# Patient Record
Sex: FEMALE | Race: BLACK OR AFRICAN AMERICAN | Employment: OTHER | URBAN - METROPOLITAN AREA
[De-identification: names, ages, dates, MRNs, and addresses within clinical notes are randomized per-mention and may not be internally consistent; named-entity substitution may affect disease eponyms.]

---

## 2017-03-02 ENCOUNTER — APPOINTMENT (OUTPATIENT)
Dept: PREADMISSION TESTING | Age: 62
End: 2017-03-02
Payer: COMMERCIAL

## 2017-03-02 ENCOUNTER — HOSPITAL ENCOUNTER (OUTPATIENT)
Dept: PREADMISSION TESTING | Age: 62
Discharge: HOME | End: 2017-03-02
Payer: COMMERCIAL

## 2017-03-02 VITALS
SYSTOLIC BLOOD PRESSURE: 136 MMHG | TEMPERATURE: 98 F | HEIGHT: 59 IN | HEART RATE: 90 BPM | RESPIRATION RATE: 20 BRPM | DIASTOLIC BLOOD PRESSURE: 80 MMHG | BODY MASS INDEX: 25.1 KG/M2 | WEIGHT: 124.5 LBS

## 2017-03-02 LAB
ABO + RH BLD: NORMAL
ANION GAP BLD CALC-SCNC: 8 MMOL/L (ref 5–15)
APPEARANCE UR: CLEAR
ATRIAL RATE: 82 BPM
BACTERIA URNS QL MICRO: NEGATIVE /HPF
BILIRUB UR QL: NEGATIVE
BLOOD GROUP ANTIBODIES SERPL: NORMAL
BUN SERPL-MCNC: 10 MG/DL (ref 6–20)
BUN/CREAT SERPL: 17 (ref 12–20)
CALCIUM SERPL-MCNC: 9 MG/DL (ref 8.5–10.1)
CALCULATED P AXIS, ECG09: 58 DEGREES
CALCULATED R AXIS, ECG10: 15 DEGREES
CALCULATED T AXIS, ECG11: 43 DEGREES
CHLORIDE SERPL-SCNC: 104 MMOL/L (ref 97–108)
CO2 SERPL-SCNC: 28 MMOL/L (ref 21–32)
COLOR UR: NORMAL
CREAT SERPL-MCNC: 0.6 MG/DL (ref 0.55–1.02)
DIAGNOSIS, 93000: NORMAL
EPITH CASTS URNS QL MICRO: NORMAL /LPF
ERYTHROCYTE [DISTWIDTH] IN BLOOD BY AUTOMATED COUNT: 13.8 % (ref 11.5–14.5)
EST. AVERAGE GLUCOSE BLD GHB EST-MCNC: 126 MG/DL
GLUCOSE SERPL-MCNC: 88 MG/DL (ref 65–100)
GLUCOSE UR STRIP.AUTO-MCNC: NEGATIVE MG/DL
HBA1C MFR BLD: 6 % (ref 4.2–6.3)
HCT VFR BLD AUTO: 37 % (ref 35–47)
HGB BLD-MCNC: 12.2 G/DL (ref 11.5–16)
HGB UR QL STRIP: NEGATIVE
HYALINE CASTS URNS QL MICRO: NORMAL /LPF (ref 0–5)
INR PPP: 1 (ref 0.9–1.1)
KETONES UR QL STRIP.AUTO: NEGATIVE MG/DL
LEUKOCYTE ESTERASE UR QL STRIP.AUTO: NEGATIVE
MCH RBC QN AUTO: 31.4 PG (ref 26–34)
MCHC RBC AUTO-ENTMCNC: 33 G/DL (ref 30–36.5)
MCV RBC AUTO: 95.1 FL (ref 80–99)
NITRITE UR QL STRIP.AUTO: NEGATIVE
P-R INTERVAL, ECG05: 138 MS
PH UR STRIP: 6.5 [PH] (ref 5–8)
PLATELET # BLD AUTO: 267 K/UL (ref 150–400)
POTASSIUM SERPL-SCNC: 3.7 MMOL/L (ref 3.5–5.1)
PROT UR STRIP-MCNC: NEGATIVE MG/DL
PROTHROMBIN TIME: 9.8 SEC (ref 9–11.1)
Q-T INTERVAL, ECG07: 346 MS
QRS DURATION, ECG06: 66 MS
QTC CALCULATION (BEZET), ECG08: 404 MS
RBC # BLD AUTO: 3.89 M/UL (ref 3.8–5.2)
RBC #/AREA URNS HPF: NORMAL /HPF (ref 0–5)
SODIUM SERPL-SCNC: 140 MMOL/L (ref 136–145)
SP GR UR REFRACTOMETRY: 1.02 (ref 1–1.03)
SPECIMEN EXP DATE BLD: NORMAL
UA: UC IF INDICATED,UAUC: NORMAL
UROBILINOGEN UR QL STRIP.AUTO: 1 EU/DL (ref 0.2–1)
VENTRICULAR RATE, ECG03: 82 BPM
WBC # BLD AUTO: 12.3 K/UL (ref 3.6–11)
WBC URNS QL MICRO: NORMAL /HPF (ref 0–4)

## 2017-03-02 PROCEDURE — 93005 ELECTROCARDIOGRAM TRACING: CPT

## 2017-03-02 PROCEDURE — 85027 COMPLETE CBC AUTOMATED: CPT | Performed by: ORTHOPAEDIC SURGERY

## 2017-03-02 PROCEDURE — 86900 BLOOD TYPING SEROLOGIC ABO: CPT | Performed by: ORTHOPAEDIC SURGERY

## 2017-03-02 PROCEDURE — 80048 BASIC METABOLIC PNL TOTAL CA: CPT | Performed by: ORTHOPAEDIC SURGERY

## 2017-03-02 PROCEDURE — 81001 URINALYSIS AUTO W/SCOPE: CPT | Performed by: ORTHOPAEDIC SURGERY

## 2017-03-02 PROCEDURE — 83036 HEMOGLOBIN GLYCOSYLATED A1C: CPT | Performed by: ORTHOPAEDIC SURGERY

## 2017-03-02 PROCEDURE — 36415 COLL VENOUS BLD VENIPUNCTURE: CPT | Performed by: ORTHOPAEDIC SURGERY

## 2017-03-02 PROCEDURE — 85610 PROTHROMBIN TIME: CPT | Performed by: ORTHOPAEDIC SURGERY

## 2017-03-02 RX ORDER — CELECOXIB 200 MG/1
200 CAPSULE ORAL ONCE
Status: CANCELLED | OUTPATIENT
Start: 2017-03-15 | End: 2017-03-15

## 2017-03-02 RX ORDER — CHLORHEXIDINE GLUCONATE 1.2 MG/ML
15 RINSE ORAL 2 TIMES DAILY
COMMUNITY
End: 2017-09-23

## 2017-03-02 RX ORDER — ATORVASTATIN CALCIUM 40 MG/1
40 TABLET, FILM COATED ORAL DAILY
COMMUNITY
End: 2021-07-01 | Stop reason: SDUPTHER

## 2017-03-02 RX ORDER — CEFAZOLIN SODIUM IN 0.9 % NACL 2 G/50 ML
2 INTRAVENOUS SOLUTION, PIGGYBACK (ML) INTRAVENOUS ONCE
Status: CANCELLED | OUTPATIENT
Start: 2017-03-15 | End: 2017-03-15

## 2017-03-02 RX ORDER — ACETAMINOPHEN 500 MG
1000 TABLET ORAL ONCE
Status: CANCELLED | OUTPATIENT
Start: 2017-03-15 | End: 2017-03-15

## 2017-03-02 RX ORDER — DEXAMETHASONE SODIUM PHOSPHATE 100 MG/10ML
10 INJECTION INTRAMUSCULAR; INTRAVENOUS ONCE
Status: CANCELLED | OUTPATIENT
Start: 2017-03-15 | End: 2017-03-15

## 2017-03-02 RX ORDER — MONTELUKAST SODIUM 10 MG/1
10 TABLET ORAL DAILY
COMMUNITY
End: 2020-06-19

## 2017-03-02 RX ORDER — DOXYCYCLINE 50 MG/1
50 TABLET ORAL DAILY
COMMUNITY
End: 2017-11-16

## 2017-03-02 RX ORDER — PREGABALIN 75 MG/1
75 CAPSULE ORAL ONCE
Status: CANCELLED | OUTPATIENT
Start: 2017-03-15 | End: 2017-03-15

## 2017-03-02 NOTE — PERIOP NOTES
Preoperative instructions reviewed with patient. Patient given six pack of CHG wipes. Instructions reviewed on use of CHG wipes. Patient given SSI infection FAQ sheet. MRSA/MSSA treatment instruction sheet given with an explanation to patient that they will be notified if treatment instructions need to be initiated. Patient was given opportunity to ask questions on the information provided.

## 2017-03-03 LAB
BACTERIA SPEC CULT: NORMAL
BACTERIA SPEC CULT: NORMAL
SERVICE CMNT-IMP: NORMAL

## 2017-03-03 NOTE — PERIOP NOTES
Faxed preadmission testing reports (and fax confirmation received) to Dr. Zenobia Pedersen. Called on 3-3-17 at 1005am ( left message on Kamini's voicemail) RE: abnormal CBC. Mireya De Souza

## 2017-03-15 ENCOUNTER — ANESTHESIA EVENT (OUTPATIENT)
Dept: SURGERY | Age: 62
DRG: 470 | End: 2017-03-15
Payer: COMMERCIAL

## 2017-03-15 ENCOUNTER — APPOINTMENT (OUTPATIENT)
Dept: GENERAL RADIOLOGY | Age: 62
DRG: 470 | End: 2017-03-15
Attending: ORTHOPAEDIC SURGERY
Payer: COMMERCIAL

## 2017-03-15 ENCOUNTER — ANESTHESIA (OUTPATIENT)
Dept: SURGERY | Age: 62
DRG: 470 | End: 2017-03-15
Payer: COMMERCIAL

## 2017-03-15 ENCOUNTER — HOSPITAL ENCOUNTER (INPATIENT)
Age: 62
LOS: 2 days | Discharge: HOME HEALTH | DRG: 470 | End: 2017-03-17
Attending: ORTHOPAEDIC SURGERY | Admitting: ORTHOPAEDIC SURGERY
Payer: COMMERCIAL

## 2017-03-15 PROBLEM — M16.11 PRIMARY OSTEOARTHRITIS OF RIGHT HIP: Status: ACTIVE | Noted: 2017-03-15

## 2017-03-15 LAB
GLUCOSE BLD STRIP.AUTO-MCNC: 97 MG/DL (ref 65–100)
SERVICE CMNT-IMP: NORMAL

## 2017-03-15 PROCEDURE — G8978 MOBILITY CURRENT STATUS: HCPCS

## 2017-03-15 PROCEDURE — C1776 JOINT DEVICE (IMPLANTABLE): HCPCS | Performed by: ORTHOPAEDIC SURGERY

## 2017-03-15 PROCEDURE — 97161 PT EVAL LOW COMPLEX 20 MIN: CPT

## 2017-03-15 PROCEDURE — 77030034850: Performed by: ORTHOPAEDIC SURGERY

## 2017-03-15 PROCEDURE — 77030007866 HC KT SPN ANES BBMI -B: Performed by: ANESTHESIOLOGY

## 2017-03-15 PROCEDURE — 77030018846 HC SOL IRR STRL H20 ICUM -A: Performed by: ORTHOPAEDIC SURGERY

## 2017-03-15 PROCEDURE — 73501 X-RAY EXAM HIP UNI 1 VIEW: CPT

## 2017-03-15 PROCEDURE — 77030002933 HC SUT MCRYL J&J -A: Performed by: ORTHOPAEDIC SURGERY

## 2017-03-15 PROCEDURE — 77030018547 HC SUT ETHBND1 J&J -B: Performed by: ORTHOPAEDIC SURGERY

## 2017-03-15 PROCEDURE — 77030010507 HC ADH SKN DERMBND J&J -B: Performed by: ORTHOPAEDIC SURGERY

## 2017-03-15 PROCEDURE — 77030035236 HC SUT PDS STRATFX BARB J&J -B: Performed by: ORTHOPAEDIC SURGERY

## 2017-03-15 PROCEDURE — 74011250636 HC RX REV CODE- 250/636

## 2017-03-15 PROCEDURE — 74011250637 HC RX REV CODE- 250/637: Performed by: ORTHOPAEDIC SURGERY

## 2017-03-15 PROCEDURE — 0SR904A REPLACEMENT OF RIGHT HIP JOINT WITH CERAMIC ON POLYETHYLENE SYNTHETIC SUBSTITUTE, UNCEMENTED, OPEN APPROACH: ICD-10-PCS | Performed by: ORTHOPAEDIC SURGERY

## 2017-03-15 PROCEDURE — 76210000006 HC OR PH I REC 0.5 TO 1 HR: Performed by: ORTHOPAEDIC SURGERY

## 2017-03-15 PROCEDURE — 77030013079 HC BLNKT BAIR HGGR 3M -A: Performed by: ANESTHESIOLOGY

## 2017-03-15 PROCEDURE — 74011000250 HC RX REV CODE- 250

## 2017-03-15 PROCEDURE — C9290 INJ, BUPIVACAINE LIPOSOME: HCPCS | Performed by: ORTHOPAEDIC SURGERY

## 2017-03-15 PROCEDURE — 97116 GAIT TRAINING THERAPY: CPT

## 2017-03-15 PROCEDURE — 77030011640 HC PAD GRND REM COVD -A: Performed by: ORTHOPAEDIC SURGERY

## 2017-03-15 PROCEDURE — 77030013079 HC BLNKT BAIR HGGR 3M -A

## 2017-03-15 PROCEDURE — 74011250636 HC RX REV CODE- 250/636: Performed by: ORTHOPAEDIC SURGERY

## 2017-03-15 PROCEDURE — G8979 MOBILITY GOAL STATUS: HCPCS

## 2017-03-15 PROCEDURE — 77030032490 HC SLV COMPR SCD KNE COVD -B

## 2017-03-15 PROCEDURE — 74011250636 HC RX REV CODE- 250/636: Performed by: ANESTHESIOLOGY

## 2017-03-15 PROCEDURE — 82962 GLUCOSE BLOOD TEST: CPT

## 2017-03-15 PROCEDURE — 74011000250 HC RX REV CODE- 250: Performed by: ORTHOPAEDIC SURGERY

## 2017-03-15 PROCEDURE — 74011000258 HC RX REV CODE- 258: Performed by: ORTHOPAEDIC SURGERY

## 2017-03-15 PROCEDURE — 77030012935 HC DRSG AQUACEL BMS -B: Performed by: ORTHOPAEDIC SURGERY

## 2017-03-15 PROCEDURE — 77030006802 HC BLD SAW RECIP BRSM -B: Performed by: ORTHOPAEDIC SURGERY

## 2017-03-15 PROCEDURE — 76060000034 HC ANESTHESIA 1.5 TO 2 HR: Performed by: ORTHOPAEDIC SURGERY

## 2017-03-15 PROCEDURE — 76000 FLUOROSCOPY <1 HR PHYS/QHP: CPT

## 2017-03-15 PROCEDURE — 77030011264 HC ELECTRD BLD EXT COVD -A: Performed by: ORTHOPAEDIC SURGERY

## 2017-03-15 PROCEDURE — 77030005520 HC CATH URETH FOL38 BARD -A: Performed by: ORTHOPAEDIC SURGERY

## 2017-03-15 PROCEDURE — 77030018836 HC SOL IRR NACL ICUM -A: Performed by: ORTHOPAEDIC SURGERY

## 2017-03-15 PROCEDURE — 76010000162 HC OR TIME 1.5 TO 2 HR INTENSV-TIER 1: Performed by: ORTHOPAEDIC SURGERY

## 2017-03-15 PROCEDURE — 65270000029 HC RM PRIVATE

## 2017-03-15 DEVICE — COMPONENT TOT HIP PRIMARY CERM ALTRX: Type: IMPLANTABLE DEVICE | Status: FUNCTIONAL

## 2017-03-15 DEVICE — CUP ACET SECTOR GRIPTION 48MM -- TI: Type: IMPLANTABLE DEVICE | Site: HIP | Status: FUNCTIONAL

## 2017-03-15 DEVICE — SCREW BNE L35MM DIA6.5MM CANC HIP DOME PINN: Type: IMPLANTABLE DEVICE | Site: HIP | Status: FUNCTIONAL

## 2017-03-15 DEVICE — HEAD FEM DIA32MM +1MM OFFSET 12/14 TAPR HIP CERAMIC BIOLOX: Type: IMPLANTABLE DEVICE | Site: HIP | Status: FUNCTIONAL

## 2017-03-15 DEVICE — IMPLANTABLE DEVICE: Type: IMPLANTABLE DEVICE | Site: HIP | Status: FUNCTIONAL

## 2017-03-15 DEVICE — LINER ACET OD48MM ID32MM NEUT OFFSET HIP ALTRX PINN: Type: IMPLANTABLE DEVICE | Site: HIP | Status: FUNCTIONAL

## 2017-03-15 RX ORDER — SODIUM CHLORIDE 0.9 % (FLUSH) 0.9 %
5-10 SYRINGE (ML) INJECTION AS NEEDED
Status: DISCONTINUED | OUTPATIENT
Start: 2017-03-15 | End: 2017-03-17 | Stop reason: HOSPADM

## 2017-03-15 RX ORDER — SODIUM CHLORIDE, SODIUM LACTATE, POTASSIUM CHLORIDE, CALCIUM CHLORIDE 600; 310; 30; 20 MG/100ML; MG/100ML; MG/100ML; MG/100ML
125 INJECTION, SOLUTION INTRAVENOUS CONTINUOUS
Status: DISCONTINUED | OUTPATIENT
Start: 2017-03-15 | End: 2017-03-15 | Stop reason: HOSPADM

## 2017-03-15 RX ORDER — DEXAMETHASONE SODIUM PHOSPHATE 10 MG/ML
10 INJECTION INTRAMUSCULAR; INTRAVENOUS ONCE
Status: DISCONTINUED | OUTPATIENT
Start: 2017-03-15 | End: 2017-03-15 | Stop reason: HOSPADM

## 2017-03-15 RX ORDER — LOSARTAN POTASSIUM 50 MG/1
100 TABLET ORAL DAILY
Status: DISCONTINUED | OUTPATIENT
Start: 2017-03-16 | End: 2017-03-17 | Stop reason: HOSPADM

## 2017-03-15 RX ORDER — DIPHENHYDRAMINE HYDROCHLORIDE 50 MG/ML
12.5 INJECTION, SOLUTION INTRAMUSCULAR; INTRAVENOUS AS NEEDED
Status: DISCONTINUED | OUTPATIENT
Start: 2017-03-15 | End: 2017-03-15 | Stop reason: HOSPADM

## 2017-03-15 RX ORDER — NALOXONE HYDROCHLORIDE 0.4 MG/ML
0.4 INJECTION, SOLUTION INTRAMUSCULAR; INTRAVENOUS; SUBCUTANEOUS AS NEEDED
Status: DISCONTINUED | OUTPATIENT
Start: 2017-03-15 | End: 2017-03-17 | Stop reason: HOSPADM

## 2017-03-15 RX ORDER — DEXAMETHASONE SODIUM PHOSPHATE 10 MG/ML
10 INJECTION INTRAMUSCULAR; INTRAVENOUS ONCE
Status: COMPLETED | OUTPATIENT
Start: 2017-03-16 | End: 2017-03-16

## 2017-03-15 RX ORDER — IRBESARTAN 300 MG/1
300 TABLET ORAL DAILY
Status: DISCONTINUED | OUTPATIENT
Start: 2017-03-16 | End: 2017-03-15 | Stop reason: CLARIF

## 2017-03-15 RX ORDER — DICLOFENAC SODIUM 75 MG/1
75 TABLET, DELAYED RELEASE ORAL 2 TIMES DAILY
Status: DISCONTINUED | OUTPATIENT
Start: 2017-03-16 | End: 2017-03-17 | Stop reason: HOSPADM

## 2017-03-15 RX ORDER — ACETAMINOPHEN 500 MG
1000 TABLET ORAL ONCE
Status: COMPLETED | OUTPATIENT
Start: 2017-03-15 | End: 2017-03-15

## 2017-03-15 RX ORDER — OXYCODONE AND ACETAMINOPHEN 5; 325 MG/1; MG/1
1 TABLET ORAL AS NEEDED
Status: DISCONTINUED | OUTPATIENT
Start: 2017-03-15 | End: 2017-03-15 | Stop reason: HOSPADM

## 2017-03-15 RX ORDER — MIDAZOLAM HYDROCHLORIDE 1 MG/ML
1 INJECTION, SOLUTION INTRAMUSCULAR; INTRAVENOUS AS NEEDED
Status: DISCONTINUED | OUTPATIENT
Start: 2017-03-15 | End: 2017-03-15 | Stop reason: HOSPADM

## 2017-03-15 RX ORDER — ASPIRIN 325 MG
325 TABLET, DELAYED RELEASE (ENTERIC COATED) ORAL 2 TIMES DAILY
Status: DISCONTINUED | OUTPATIENT
Start: 2017-03-15 | End: 2017-03-17 | Stop reason: HOSPADM

## 2017-03-15 RX ORDER — AMOXICILLIN 250 MG
1 CAPSULE ORAL 2 TIMES DAILY
Status: DISCONTINUED | OUTPATIENT
Start: 2017-03-15 | End: 2017-03-17 | Stop reason: HOSPADM

## 2017-03-15 RX ORDER — HYDROMORPHONE HYDROCHLORIDE 1 MG/ML
0.5 INJECTION, SOLUTION INTRAMUSCULAR; INTRAVENOUS; SUBCUTANEOUS
Status: ACTIVE | OUTPATIENT
Start: 2017-03-15 | End: 2017-03-16

## 2017-03-15 RX ORDER — OXYCODONE HYDROCHLORIDE 5 MG/1
5 TABLET ORAL
Status: DISCONTINUED | OUTPATIENT
Start: 2017-03-15 | End: 2017-03-17 | Stop reason: HOSPADM

## 2017-03-15 RX ORDER — ATORVASTATIN CALCIUM 40 MG/1
40 TABLET, FILM COATED ORAL DAILY
Status: DISCONTINUED | OUTPATIENT
Start: 2017-03-16 | End: 2017-03-17 | Stop reason: HOSPADM

## 2017-03-15 RX ORDER — BUPIVACAINE HYDROCHLORIDE 5 MG/ML
INJECTION, SOLUTION EPIDURAL; INTRACAUDAL AS NEEDED
Status: DISCONTINUED | OUTPATIENT
Start: 2017-03-15 | End: 2017-03-15 | Stop reason: HOSPADM

## 2017-03-15 RX ORDER — PREGABALIN 75 MG/1
75 CAPSULE ORAL ONCE
Status: COMPLETED | OUTPATIENT
Start: 2017-03-15 | End: 2017-03-15

## 2017-03-15 RX ORDER — CEFAZOLIN SODIUM IN 0.9 % NACL 2 G/50 ML
2 INTRAVENOUS SOLUTION, PIGGYBACK (ML) INTRAVENOUS EVERY 8 HOURS
Status: COMPLETED | OUTPATIENT
Start: 2017-03-15 | End: 2017-03-16

## 2017-03-15 RX ORDER — ONDANSETRON 2 MG/ML
4 INJECTION INTRAMUSCULAR; INTRAVENOUS AS NEEDED
Status: DISCONTINUED | OUTPATIENT
Start: 2017-03-15 | End: 2017-03-15 | Stop reason: HOSPADM

## 2017-03-15 RX ORDER — ACETAMINOPHEN 325 MG/1
650 TABLET ORAL EVERY 6 HOURS
Status: DISCONTINUED | OUTPATIENT
Start: 2017-03-15 | End: 2017-03-17 | Stop reason: HOSPADM

## 2017-03-15 RX ORDER — LIDOCAINE HYDROCHLORIDE 10 MG/ML
0.1 INJECTION, SOLUTION EPIDURAL; INFILTRATION; INTRACAUDAL; PERINEURAL AS NEEDED
Status: DISCONTINUED | OUTPATIENT
Start: 2017-03-15 | End: 2017-03-15 | Stop reason: HOSPADM

## 2017-03-15 RX ORDER — SODIUM CHLORIDE 0.9 % (FLUSH) 0.9 %
5-10 SYRINGE (ML) INJECTION EVERY 8 HOURS
Status: DISCONTINUED | OUTPATIENT
Start: 2017-03-15 | End: 2017-03-15 | Stop reason: HOSPADM

## 2017-03-15 RX ORDER — MORPHINE SULFATE 10 MG/ML
2 INJECTION, SOLUTION INTRAMUSCULAR; INTRAVENOUS
Status: DISCONTINUED | OUTPATIENT
Start: 2017-03-15 | End: 2017-03-15 | Stop reason: HOSPADM

## 2017-03-15 RX ORDER — POLYETHYLENE GLYCOL 3350 17 G/17G
17 POWDER, FOR SOLUTION ORAL DAILY
Status: DISCONTINUED | OUTPATIENT
Start: 2017-03-16 | End: 2017-03-17 | Stop reason: HOSPADM

## 2017-03-15 RX ORDER — HYDROMORPHONE HYDROCHLORIDE 1 MG/ML
0.2 INJECTION, SOLUTION INTRAMUSCULAR; INTRAVENOUS; SUBCUTANEOUS
Status: DISCONTINUED | OUTPATIENT
Start: 2017-03-15 | End: 2017-03-15 | Stop reason: HOSPADM

## 2017-03-15 RX ORDER — DOXYCYCLINE HYCLATE 100 MG
50 TABLET ORAL DAILY
Status: DISCONTINUED | OUTPATIENT
Start: 2017-03-16 | End: 2017-03-17 | Stop reason: HOSPADM

## 2017-03-15 RX ORDER — FENTANYL CITRATE 50 UG/ML
50 INJECTION, SOLUTION INTRAMUSCULAR; INTRAVENOUS AS NEEDED
Status: DISCONTINUED | OUTPATIENT
Start: 2017-03-15 | End: 2017-03-15 | Stop reason: HOSPADM

## 2017-03-15 RX ORDER — DEXAMETHASONE SODIUM PHOSPHATE 4 MG/ML
INJECTION, SOLUTION INTRA-ARTICULAR; INTRALESIONAL; INTRAMUSCULAR; INTRAVENOUS; SOFT TISSUE AS NEEDED
Status: DISCONTINUED | OUTPATIENT
Start: 2017-03-15 | End: 2017-03-15 | Stop reason: HOSPADM

## 2017-03-15 RX ORDER — HYDROXYZINE HYDROCHLORIDE 10 MG/1
10 TABLET, FILM COATED ORAL
Status: DISCONTINUED | OUTPATIENT
Start: 2017-03-15 | End: 2017-03-17 | Stop reason: HOSPADM

## 2017-03-15 RX ORDER — ONDANSETRON 2 MG/ML
4 INJECTION INTRAMUSCULAR; INTRAVENOUS
Status: ACTIVE | OUTPATIENT
Start: 2017-03-15 | End: 2017-03-16

## 2017-03-15 RX ORDER — KETAMINE HYDROCHLORIDE 10 MG/ML
INJECTION, SOLUTION INTRAMUSCULAR; INTRAVENOUS AS NEEDED
Status: DISCONTINUED | OUTPATIENT
Start: 2017-03-15 | End: 2017-03-15 | Stop reason: HOSPADM

## 2017-03-15 RX ORDER — SODIUM CHLORIDE 9 MG/ML
1000 INJECTION, SOLUTION INTRAVENOUS CONTINUOUS
Status: DISCONTINUED | OUTPATIENT
Start: 2017-03-15 | End: 2017-03-15 | Stop reason: HOSPADM

## 2017-03-15 RX ORDER — MIDAZOLAM HYDROCHLORIDE 1 MG/ML
0.5 INJECTION, SOLUTION INTRAMUSCULAR; INTRAVENOUS
Status: DISCONTINUED | OUTPATIENT
Start: 2017-03-15 | End: 2017-03-15 | Stop reason: HOSPADM

## 2017-03-15 RX ORDER — SODIUM CHLORIDE 0.9 % (FLUSH) 0.9 %
5-10 SYRINGE (ML) INJECTION AS NEEDED
Status: DISCONTINUED | OUTPATIENT
Start: 2017-03-15 | End: 2017-03-15 | Stop reason: HOSPADM

## 2017-03-15 RX ORDER — HYDROCHLOROTHIAZIDE 25 MG/1
12.5 TABLET ORAL DAILY
Status: DISCONTINUED | OUTPATIENT
Start: 2017-03-16 | End: 2017-03-17 | Stop reason: HOSPADM

## 2017-03-15 RX ORDER — FENTANYL CITRATE 50 UG/ML
25 INJECTION, SOLUTION INTRAMUSCULAR; INTRAVENOUS
Status: DISCONTINUED | OUTPATIENT
Start: 2017-03-15 | End: 2017-03-15 | Stop reason: HOSPADM

## 2017-03-15 RX ORDER — DICLOFENAC SODIUM 75 MG/1
75 TABLET, DELAYED RELEASE ORAL 2 TIMES DAILY
Status: DISCONTINUED | OUTPATIENT
Start: 2017-03-15 | End: 2017-03-15

## 2017-03-15 RX ORDER — SODIUM CHLORIDE, SODIUM LACTATE, POTASSIUM CHLORIDE, CALCIUM CHLORIDE 600; 310; 30; 20 MG/100ML; MG/100ML; MG/100ML; MG/100ML
INJECTION, SOLUTION INTRAVENOUS
Status: DISCONTINUED | OUTPATIENT
Start: 2017-03-15 | End: 2017-03-15 | Stop reason: HOSPADM

## 2017-03-15 RX ORDER — PROPOFOL 10 MG/ML
INJECTION, EMULSION INTRAVENOUS AS NEEDED
Status: DISCONTINUED | OUTPATIENT
Start: 2017-03-15 | End: 2017-03-15 | Stop reason: HOSPADM

## 2017-03-15 RX ORDER — TRANEXAMIC ACID 100 MG/ML
20 INJECTION, SOLUTION INTRAVENOUS ONCE
Status: COMPLETED | OUTPATIENT
Start: 2017-03-15 | End: 2017-03-15

## 2017-03-15 RX ORDER — MONTELUKAST SODIUM 10 MG/1
10 TABLET ORAL DAILY
Status: DISCONTINUED | OUTPATIENT
Start: 2017-03-16 | End: 2017-03-17 | Stop reason: HOSPADM

## 2017-03-15 RX ORDER — FAMOTIDINE 20 MG/1
20 TABLET, FILM COATED ORAL 2 TIMES DAILY
Status: DISCONTINUED | OUTPATIENT
Start: 2017-03-15 | End: 2017-03-17 | Stop reason: HOSPADM

## 2017-03-15 RX ORDER — ALBUTEROL SULFATE 0.83 MG/ML
2.5 SOLUTION RESPIRATORY (INHALATION)
Status: DISCONTINUED | OUTPATIENT
Start: 2017-03-15 | End: 2017-03-17 | Stop reason: HOSPADM

## 2017-03-15 RX ORDER — FACIAL-BODY WIPES
10 EACH TOPICAL DAILY PRN
Status: DISCONTINUED | OUTPATIENT
Start: 2017-03-17 | End: 2017-03-17 | Stop reason: HOSPADM

## 2017-03-15 RX ORDER — SODIUM CHLORIDE 9 MG/ML
125 INJECTION, SOLUTION INTRAVENOUS CONTINUOUS
Status: DISPENSED | OUTPATIENT
Start: 2017-03-15 | End: 2017-03-16

## 2017-03-15 RX ORDER — TRAMADOL HYDROCHLORIDE 50 MG/1
50 TABLET ORAL EVERY 6 HOURS
Status: DISCONTINUED | OUTPATIENT
Start: 2017-03-15 | End: 2017-03-17 | Stop reason: HOSPADM

## 2017-03-15 RX ORDER — CELECOXIB 200 MG/1
200 CAPSULE ORAL ONCE
Status: COMPLETED | OUTPATIENT
Start: 2017-03-15 | End: 2017-03-15

## 2017-03-15 RX ORDER — DEXAMETHASONE SODIUM PHOSPHATE 100 MG/10ML
10 INJECTION INTRAMUSCULAR; INTRAVENOUS ONCE
Status: DISCONTINUED | OUTPATIENT
Start: 2017-03-15 | End: 2017-03-15 | Stop reason: SDUPTHER

## 2017-03-15 RX ORDER — PROPOFOL 10 MG/ML
INJECTION, EMULSION INTRAVENOUS
Status: DISCONTINUED | OUTPATIENT
Start: 2017-03-15 | End: 2017-03-15 | Stop reason: HOSPADM

## 2017-03-15 RX ORDER — KETOROLAC TROMETHAMINE 30 MG/ML
30 INJECTION, SOLUTION INTRAMUSCULAR; INTRAVENOUS EVERY 6 HOURS
Status: COMPLETED | OUTPATIENT
Start: 2017-03-15 | End: 2017-03-16

## 2017-03-15 RX ORDER — SODIUM CHLORIDE 0.9 % (FLUSH) 0.9 %
5-10 SYRINGE (ML) INJECTION EVERY 8 HOURS
Status: DISCONTINUED | OUTPATIENT
Start: 2017-03-16 | End: 2017-03-17 | Stop reason: HOSPADM

## 2017-03-15 RX ORDER — SODIUM CHLORIDE 9 MG/ML
50 INJECTION, SOLUTION INTRAVENOUS CONTINUOUS
Status: DISCONTINUED | OUTPATIENT
Start: 2017-03-15 | End: 2017-03-15 | Stop reason: HOSPADM

## 2017-03-15 RX ORDER — ALBUTEROL SULFATE 90 UG/1
2 AEROSOL, METERED RESPIRATORY (INHALATION)
Status: DISCONTINUED | OUTPATIENT
Start: 2017-03-15 | End: 2017-03-15 | Stop reason: CLARIF

## 2017-03-15 RX ORDER — MIDAZOLAM HYDROCHLORIDE 1 MG/ML
INJECTION, SOLUTION INTRAMUSCULAR; INTRAVENOUS AS NEEDED
Status: DISCONTINUED | OUTPATIENT
Start: 2017-03-15 | End: 2017-03-15 | Stop reason: HOSPADM

## 2017-03-15 RX ORDER — CEFAZOLIN SODIUM IN 0.9 % NACL 2 G/50 ML
2 INTRAVENOUS SOLUTION, PIGGYBACK (ML) INTRAVENOUS ONCE
Status: COMPLETED | OUTPATIENT
Start: 2017-03-15 | End: 2017-03-15

## 2017-03-15 RX ADMIN — ACETAMINOPHEN 650 MG: 325 TABLET, FILM COATED ORAL at 14:04

## 2017-03-15 RX ADMIN — CEFAZOLIN 2 G: 1 INJECTION, POWDER, FOR SOLUTION INTRAMUSCULAR; INTRAVENOUS; PARENTERAL at 23:44

## 2017-03-15 RX ADMIN — MIDAZOLAM HYDROCHLORIDE 2 MG: 1 INJECTION, SOLUTION INTRAMUSCULAR; INTRAVENOUS at 07:24

## 2017-03-15 RX ADMIN — CEFAZOLIN 2 G: 1 INJECTION, POWDER, FOR SOLUTION INTRAMUSCULAR; INTRAVENOUS; PARENTERAL at 16:27

## 2017-03-15 RX ADMIN — DEXAMETHASONE SODIUM PHOSPHATE 10 MG: 4 INJECTION, SOLUTION INTRA-ARTICULAR; INTRALESIONAL; INTRAMUSCULAR; INTRAVENOUS; SOFT TISSUE at 07:50

## 2017-03-15 RX ADMIN — BUPIVACAINE HYDROCHLORIDE 15 MG: 5 INJECTION, SOLUTION EPIDURAL; INTRACAUDAL at 07:35

## 2017-03-15 RX ADMIN — ACETAMINOPHEN 1000 MG: 500 TABLET, FILM COATED ORAL at 06:47

## 2017-03-15 RX ADMIN — FENTANYL CITRATE 25 MCG: 50 INJECTION, SOLUTION INTRAMUSCULAR; INTRAVENOUS at 11:01

## 2017-03-15 RX ADMIN — CEFAZOLIN 2 G: 1 INJECTION, POWDER, FOR SOLUTION INTRAMUSCULAR; INTRAVENOUS; PARENTERAL at 07:40

## 2017-03-15 RX ADMIN — KETOROLAC TROMETHAMINE 30 MG: 30 INJECTION, SOLUTION INTRAMUSCULAR at 16:28

## 2017-03-15 RX ADMIN — ASPIRIN 325 MG: 325 TABLET, DELAYED RELEASE ORAL at 14:04

## 2017-03-15 RX ADMIN — PREGABALIN 75 MG: 75 CAPSULE ORAL at 06:47

## 2017-03-15 RX ADMIN — CELECOXIB 200 MG: 200 CAPSULE ORAL at 06:47

## 2017-03-15 RX ADMIN — MIDAZOLAM HYDROCHLORIDE 1 MG: 1 INJECTION, SOLUTION INTRAMUSCULAR; INTRAVENOUS at 07:31

## 2017-03-15 RX ADMIN — SODIUM CHLORIDE, SODIUM LACTATE, POTASSIUM CHLORIDE, CALCIUM CHLORIDE: 600; 310; 30; 20 INJECTION, SOLUTION INTRAVENOUS at 07:24

## 2017-03-15 RX ADMIN — MIDAZOLAM HYDROCHLORIDE 1 MG: 1 INJECTION, SOLUTION INTRAMUSCULAR; INTRAVENOUS at 07:27

## 2017-03-15 RX ADMIN — SODIUM CHLORIDE 125 ML/HR: 900 INJECTION, SOLUTION INTRAVENOUS at 23:44

## 2017-03-15 RX ADMIN — FAMOTIDINE 20 MG: 20 TABLET ORAL at 14:04

## 2017-03-15 RX ADMIN — SODIUM CHLORIDE, SODIUM LACTATE, POTASSIUM CHLORIDE, AND CALCIUM CHLORIDE 125 ML/HR: 600; 310; 30; 20 INJECTION, SOLUTION INTRAVENOUS at 06:41

## 2017-03-15 RX ADMIN — PROPOFOL 10 MG: 10 INJECTION, EMULSION INTRAVENOUS at 08:37

## 2017-03-15 RX ADMIN — SODIUM CHLORIDE 125 ML/HR: 900 INJECTION, SOLUTION INTRAVENOUS at 15:33

## 2017-03-15 RX ADMIN — TRAMADOL HYDROCHLORIDE 50 MG: 50 TABLET, FILM COATED ORAL at 19:14

## 2017-03-15 RX ADMIN — KETAMINE HYDROCHLORIDE 25 MG: 10 INJECTION, SOLUTION INTRAMUSCULAR; INTRAVENOUS at 07:32

## 2017-03-15 RX ADMIN — PROPOFOL 50 MCG/KG/MIN: 10 INJECTION, EMULSION INTRAVENOUS at 07:45

## 2017-03-15 RX ADMIN — ACETAMINOPHEN 650 MG: 325 TABLET, FILM COATED ORAL at 19:14

## 2017-03-15 RX ADMIN — TRAMADOL HYDROCHLORIDE 50 MG: 50 TABLET, FILM COATED ORAL at 14:04

## 2017-03-15 RX ADMIN — MIDAZOLAM HYDROCHLORIDE 1 MG: 1 INJECTION, SOLUTION INTRAMUSCULAR; INTRAVENOUS at 07:40

## 2017-03-15 RX ADMIN — DOCUSATE SODIUM AND SENNOSIDES 1 TABLET: 8.6; 5 TABLET, FILM COATED ORAL at 14:04

## 2017-03-15 RX ADMIN — PROPOFOL 30 MG: 10 INJECTION, EMULSION INTRAVENOUS at 07:32

## 2017-03-15 NOTE — IP AVS SNAPSHOT
Current Discharge Medication List  
  
START taking these medications Dose & Instructions Dispensing Information Comments Morning Noon Evening Bedtime  
 aspirin delayed-release 325 mg tablet Replaces:  aspirin 81 mg tablet Your last dose was: Your next dose is:    
   
   
 Dose:  325 mg Take 1 Tab by mouth two (2) times daily (with meals). Indications: For prevention of Blood Clots after Total Hip Replacement Quantity:  60 Tab Refills:  0  
     
   
   
   
  
 diclofenac EC 75 mg EC tablet Commonly known as:  VOLTAREN Your last dose was: Your next dose is:    
   
   
 Dose:  75 mg Take 1 Tab by mouth two (2) times a day. Quantity:  60 Tab Refills:  0  
     
   
   
   
  
 oxyCODONE IR 5 mg immediate release tablet Commonly known as:  Lisa Lindsay Your last dose was: Your next dose is:    
   
   
 Dose:  2.5-5 mg Take 0.5-1 Tabs by mouth every four (4) hours as needed. Max Daily Amount: 30 mg. Indications: Severe Incisional Hip Pain Quantity:  40 Tab Refills:  0  
     
   
   
   
  
 traMADol 50 mg tablet Commonly known as:  ULTRAM  
   
Your last dose was: Your next dose is:    
   
   
 Dose:  50 mg Take 1 Tab by mouth every six (6) hours. Max Daily Amount: 200 mg. Indications: Postoperative Incisional Pain Quantity:  60 Tab Refills:  0 CONTINUE these medications which have NOT CHANGED Dose & Instructions Dispensing Information Comments Morning Noon Evening Bedtime  
 albuterol 90 mcg/actuation inhaler Commonly known as:  PROVENTIL HFA, VENTOLIN HFA, PROAIR HFA Your last dose was: Your next dose is:    
   
   
 Dose:  2 Puff Take 2 puffs by inhalation every six (6) hours as needed for Wheezing. Quantity:  1 Inhaler Refills:  0  
     
   
   
   
  
 atorvastatin 40 mg tablet Commonly known as:  LIPITOR Your last dose was: Your next dose is:    
   
   
 Dose:  40 mg Take 40 mg by mouth daily. Refills:  0  
     
   
   
   
  
 chlorhexidine 0.12 % solution Commonly known as:  PERIDEX Your last dose was: Your next dose is:    
   
   
 Dose:  15 mL  
15 mL by Swish and Spit route two (2) times a day. Refills:  0  
     
   
   
   
  
 doxycycline 50 mg tablet Commonly known as:  ADOXA Your last dose was: Your next dose is:    
   
   
 Dose:  50 mg Take 50 mg by mouth daily. Refills:  0  
     
   
   
   
  
 hydroCHLOROthiazide 12.5 mg tablet Commonly known as:  HYDRODIURIL Your last dose was: Your next dose is:    
   
   
 Dose:  12.5 mg Take 1 tablet by mouth daily. Needs to schedule ov before further refills. Indications: HYPERTENSION Quantity:  30 tablet Refills:  2  
     
   
   
   
  
 irbesartan 300 mg tablet Commonly known as:  AVAPRO Your last dose was: Your next dose is:    
   
   
 Dose:  300 mg Take 1 Tab by mouth daily. Quantity:  30 Tab Refills:  5  
     
   
   
   
  
 montelukast 10 mg tablet Commonly known as:  SINGULAIR Your last dose was: Your next dose is:    
   
   
 Dose:  10 mg Take 10 mg by mouth daily. Refills:  0 PROBIOTIC 4X 10-15 mg Tbec Generic drug:  B.infantis-B.ani-B.long-B.bifi Your last dose was: Your next dose is: Take  by mouth daily. Refills:  0  
     
   
   
   
  
 VITAMIN D3 1,000 unit tablet Generic drug:  cholecalciferol Your last dose was: Your next dose is: Take  by mouth daily. Refills:  0 STOP taking these medications   
 aspirin 81 mg tablet Replaced by:  aspirin delayed-release 325 mg tablet Where to Get Your Medications Information on where to get these meds will be given to you by the nurse or doctor. ! Ask your nurse or doctor about these medications  
  aspirin delayed-release 325 mg tablet  
 diclofenac EC 75 mg EC tablet  
 oxyCODONE IR 5 mg immediate release tablet  
 traMADol 50 mg tablet

## 2017-03-15 NOTE — PROGRESS NOTES
Problem: Mobility Impaired (Adult and Pediatric)  Goal: *Acute Goals and Plan of Care (Insert Text)  Physical Therapy Goals  Initiated 3/15/2017    1. Patient will move from supine to sit and sit to supine , scoot up and down and roll side to side in bed with modified independence within 4 days. 2. Patient will perform sit to stand with modified independence within 4 days. 3. Patient will ambulate with modified independence for 150 feet with the least restrictive device within 4 days. 4. Patient will ascend/descend 6 stairs with 2 handrail(s) with modified independence within 4 days. 5. Patient will perform THR home exercise program per protocol with modified independence within 4 days. PHYSICAL THERAPY EVALUATION  Patient: Dionte Handy (9 y.o. female)  Date: 3/15/2017  Primary Diagnosis: DJD  Primary osteoarthritis of right hip  Procedure(s) (LRB):  RIGHT TOTAL HIP ANTERIOR APPROACH (Right) Day of Surgery   Precautions:   WBAT (avoid extreme motions)      ASSESSMENT :  Based on the objective data described below, the patient presents with decreased LE strength, impaired balance without UE support, and overall decreased independence with mobility following R THR POD 0. PTA patient was independent and lived alone. She plans to have her sister stay with her at discharge. Overall requires CGA for transfers and ambulation. Using RW currently for balance with gait and able to tolerate step through pattern. Returned to supine at end of session. VSS throughout session. Recommend HHPT. Patient will benefit from skilled intervention to address the above impairments.   Patients rehabilitation potential is considered to be Good  Factors which may influence rehabilitation potential include:   [ ]         None noted  [ ]         Mental ability/status  [X]         Medical condition  [ ]         Home/family situation and support systems  [ ]         Safety awareness  [ ]         Pain tolerance/management  [ ] Other: PLAN :  Recommendations and Planned Interventions:  [X]           Bed Mobility Training             [X]    Neuromuscular Re-Education  [X]           Transfer Training                   [ ]    Orthotic/Prosthetic Training  [X]           Gait Training                         [ ]    Modalities  [X]           Therapeutic Exercises           [ ]    Edema Management/Control  [X]           Therapeutic Activities            [X]    Patient and Family Training/Education  [ ]           Other (comment):     Frequency/Duration: Patient will be followed by physical therapy  twice daily to address goals. Discharge Recommendations: Home Health  Further Equipment Recommendations for Discharge: ordered RW       SUBJECTIVE:   Patient stated I feel pretty good.       OBJECTIVE DATA SUMMARY:   HISTORY:    Past Medical History:   Diagnosis Date    Allergic asthma       SEASONAL AND DOG ALLERGIES    Arthritis      Cyst       removal -groin-dr singer    Hypercholesterolemia 2001    Hypertension 2001    Non-seasonal allergic rhinitis 6-2011     dr Dania Garcia Osteoporosis 2006     Past Surgical History:   Procedure Laterality Date    ENDOSCOPY, COLON, DIAGNOSTIC   2007     due 2017    HX GYN   1974     OVARIAN CYST REMOVED    NATALIE BIOPSY BREAST STEREOTACTIC Right 2010     benign     Prior Level of Function/Home Situation: independent  Personal factors and/or comorbidities impacting plan of care:      Home Situation  Home Environment: Private residence  # Steps to Enter: 6  Rails to Enter: Yes  Hand Rails : Bilateral  One/Two Story Residence: One story  Living Alone: No  Support Systems: Family member(s)  Patient Expects to be Discharged to[de-identified] Private residence  Current DME Used/Available at Home: [employer]     EXAMINATION/PRESENTATION/DECISION MAKING:   Critical Behavior:  Neurologic State: Alert, Appropriate for age  Orientation Level: Oriented X4        Hearing:   Auditory  Auditory Impairment: None  Skin: Edema:   Range Of Motion:  AROM: Generally decreased, functional           PROM: Generally decreased, functional           Strength:    Strength: Generally decreased, functional                    Tone & Sensation:                                  Coordination:     Vision:      Functional Mobility:  Bed Mobility:     Supine to Sit: Contact guard assistance  Sit to Supine: Contact guard assistance     Transfers:  Sit to Stand: Contact guard assistance  Stand to Sit: Contact guard assistance  Stand Pivot Transfers: Contact guard assistance                    Balance:   Sitting: Intact  Standing: Intact; With support  Ambulation/Gait Training:  Distance (ft): 30 Feet (ft)  Assistive Device: Gait belt;Walker, rolling  Ambulation - Level of Assistance: Contact guard assistance        Gait Abnormalities: Antalgic;Decreased step clearance  Right Side Weight Bearing: As tolerated        Stance: Right decreased  Speed/Janie: Pace decreased (<100 feet/min)  Step Length: Right shortened;Left shortened                                           Stairs: Therapeutic Exercises:    Ankle pumps, quad sets, heel slides x 10     Functional Measure:  Tinetti test:      Sitting Balance: 1  Arises: 1  Attempts to Rise: 2  Immediate Standing Balance: 1  Standing Balance: 1  Nudged: 1  Eyes Closed: 0  Turn 360 Degrees - Continuous/Discontinuous: 0  Turn 360 Degrees - Steady/Unsteady: 1  Sitting Down: 1  Balance Score: 9  Indication of Gait: 1  R Step Length/Height: 1  L Step Length/Height: 1  R Foot Clearance: 1  L Foot Clearance: 1  Step Symmetry: 1  Step Continuity: 1  Path: 1  Trunk: 1  Walking Time: 0  Gait Score: 9  Total Score: 18         Tinetti Test and G-code impairment scale:  Percentage of Impairment CH     0%    CI     1-19% CJ     20-39% CK     40-59% CL     60-79% CM     80-99% CN      100%   Tinetti  Score 0-28 28 23-27 17-22 12-16 6-11 1-5 0          Tinetti Tool Score Risk of Falls  <19 = High Fall Risk  19-24 = Moderate Fall Risk  25-28 = Low Fall Risk  Massimo FINLEY. Performance-Oriented Assessment of Mobility Problems in Elderly Patients. Simons 66; [id number]. (Scoring Description: PT Bulletin Feb. 10, 1993)     Older adults: Gabi Washington et al, 2009; n = 1000 [facility] elderly evaluated with ABC, SCOUT, ADL, and IADL)  · Mean SCOUT score for males aged 69-68 years = 26.21(3.40)  · Mean SCOUT score for females age 69-68 years = 25.16(4.30)  · Mean SCOUT score for males over 80 years = 23.29(6.02)  · Mean SCOUT score for females over 80 years = 17.20(8.32)            G codes: In compliance with CMSs Claims Based Outcome Reporting, the following G-code set was chosen for this patient based on their primary functional limitation being treated: The outcome measure chosen to determine the severity of the functional limitation was the Tinetti with a score of 18/28 which was correlated with the impairment scale.       · Mobility - Walking and Moving Around:               - CURRENT STATUS:    CJ - 20%-39% impaired, limited or restricted               - GOAL STATUS:           CI - 1%-19% impaired, limited or restricted               - D/C STATUS:                       ---------------To be determined---------------      Physical Therapy Evaluation Charge Determination   History Examination Presentation Decision-Making   MEDIUM  Complexity : 1-2 comorbidities / personal factors will impact the outcome/ POC  LOW Complexity : 1-2 Standardized tests and measures addressing body structure, function, activity limitation and / or participation in recreation  LOW Complexity : Stable, uncomplicated  Other outcome measures Tinetti  MEDIUM      Based on the above components, the patient evaluation is determined to be of the following complexity level: LOW      Pain:  Pain Scale 1: Numeric (0 - 10)  Pain Intensity 1: 4  Pain Location 1: Hip  Pain Orientation 1: Right  Pain Description 1: Other (comment) (sting)  Pain Intervention(s) 1: Medication (see MAR)  Activity Tolerance:   VSS  Please refer to the flowsheet for vital signs taken during this treatment. After treatment:   [ ]         Patient left in no apparent distress sitting up in chair  [X]         Patient left in no apparent distress in bed  [X]         Call bell left within reach  [X]         Nursing notified  [X]         Caregiver present  [ ]         Bed alarm activated      COMMUNICATION/EDUCATION:   The patients plan of care was discussed with: Registered Nurse.  [X]         Fall prevention education was provided and the patient/caregiver indicated understanding. [X]         Patient/family have participated as able in goal setting and plan of care. [X]         Patient/family agree to work toward stated goals and plan of care. [ ]         Patient understands intent and goals of therapy, but is neutral about his/her participation. [ ]         Patient is unable to participate in goal setting and plan of care.      Thank you for this referral.  Christopher Ayala, PT   Time Calculation: 21 mins

## 2017-03-15 NOTE — PERIOP NOTES
Varying degrees of traction/rotation applied to right leg and rotation applied to left leg per surgeons request.

## 2017-03-15 NOTE — IP AVS SNAPSHOT
[address] [address] 
[phone] Patient: Abdi Polanco MRN: [id number] You are allergic to the following No active allergies Recent Documentation Height Weight Breastfeeding? BMI OB Status Smoking Status 1.499 m 56.5 kg No 25.15 kg/m2 Postmenopausal Current Every Day Smoker Emergency Contacts Name Discharge Info Relation Home Work Mobile Dieudonne Dent DISCHARGE CAREGIVER [3] Other Relative [6]   [phone] Ettie Zheng  Son [22]   [phone] About your hospitalization You were admitted on:  March 15, 2017 You last received care in the:  [facility] You were discharged on:  2017 Unit phone number:  [phone] Why you were hospitalized Your primary diagnosis was:  Not on File Your diagnoses also included:  Primary Osteoarthritis Of Right Hip Providers Seen During Your Hospitalizations Provider Role Specialty Primary office phone Jenny Enciso MD Attending Provider Orthopedic Surgery [phone] Your Primary Care Physician (PCP) Primary Care Physician Office Phone Office Fax Sarah Sandoval [phone] Follow-up Information Follow up With Details Comments Contact Info Jabier Nieto MD   [address] [place] [address] 
[phone] [facility] Please call office if you have not heard from staff member by 12 noon first full day after discharge [address] [place] [address] [phone] Current Discharge Medication List  
  
START taking these medications Dose & Instructions Dispensing Information Comments Morning Noon Evening Bedtime  
 aspirin delayed-release 325 mg tablet Replaces:  aspirin 81 mg tablet Your last dose was:     
   
Your next dose is:    
   
   
 Dose:  325 mg  
 Take 1 Tab by mouth two (2) times daily (with meals). Indications: For prevention of Blood Clots after Total Hip Replacement Quantity:  60 Tab Refills:  0  
     
   
   
   
  
 diclofenac EC 75 mg EC tablet Commonly known as:  VOLTAREN Your last dose was: Your next dose is:    
   
   
 Dose:  75 mg Take 1 Tab by mouth two (2) times a day. Quantity:  60 Tab Refills:  0  
     
   
   
   
  
 oxyCODONE IR 5 mg immediate release tablet Commonly known as:  Belen Mani Your last dose was: Your next dose is:    
   
   
 Dose:  2.5-5 mg Take 0.5-1 Tabs by mouth every four (4) hours as needed. Max Daily Amount: 30 mg. Indications: Severe Incisional Hip Pain Quantity:  40 Tab Refills:  0  
     
   
   
   
  
 traMADol 50 mg tablet Commonly known as:  ULTRAM  
   
Your last dose was: Your next dose is:    
   
   
 Dose:  50 mg Take 1 Tab by mouth every six (6) hours. Max Daily Amount: 200 mg. Indications: Postoperative Incisional Pain Quantity:  60 Tab Refills:  0 CONTINUE these medications which have NOT CHANGED Dose & Instructions Dispensing Information Comments Morning Noon Evening Bedtime  
 albuterol 90 mcg/actuation inhaler Commonly known as:  PROVENTIL HFA, VENTOLIN HFA, PROAIR HFA Your last dose was: Your next dose is:    
   
   
 Dose:  2 Puff Take 2 puffs by inhalation every six (6) hours as needed for Wheezing. Quantity:  1 Inhaler Refills:  0  
     
   
   
   
  
 atorvastatin 40 mg tablet Commonly known as:  LIPITOR Your last dose was: Your next dose is:    
   
   
 Dose:  40 mg Take 40 mg by mouth daily. Refills:  0  
     
   
   
   
  
 chlorhexidine 0.12 % solution Commonly known as:  PERIDEX Your last dose was: Your next dose is:    
   
   
 Dose:  15 mL  
15 mL by Swish and Spit route two (2) times a day. Refills:  0 doxycycline 50 mg tablet Commonly known as:  ADOXA Your last dose was: Your next dose is:    
   
   
 Dose:  50 mg Take 50 mg by mouth daily. Refills:  0  
     
   
   
   
  
 hydroCHLOROthiazide 12.5 mg tablet Commonly known as:  HYDRODIURIL Your last dose was: Your next dose is:    
   
   
 Dose:  12.5 mg Take 1 tablet by mouth daily. Needs to schedule ov before further refills. Indications: HYPERTENSION Quantity:  30 tablet Refills:  2  
     
   
   
   
  
 irbesartan 300 mg tablet Commonly known as:  AVAPRO Your last dose was: Your next dose is:    
   
   
 Dose:  300 mg Take 1 Tab by mouth daily. Quantity:  30 Tab Refills:  5  
     
   
   
   
  
 montelukast 10 mg tablet Commonly known as:  SINGULAIR Your last dose was: Your next dose is:    
   
   
 Dose:  10 mg Take 10 mg by mouth daily. Refills:  0 PROBIOTIC 4X 10-15 mg Tbec Generic drug:  B.infantis-B.ani-B.long-B.bifi Your last dose was: Your next dose is: Take  by mouth daily. Refills:  0  
     
   
   
   
  
 VITAMIN D3 1,000 unit tablet Generic drug:  cholecalciferol Your last dose was: Your next dose is: Take  by mouth daily. Refills:  0 STOP taking these medications   
 aspirin 81 mg tablet Replaced by:  aspirin delayed-release 325 mg tablet Where to Get Your Medications Information on where to get these meds will be given to you by the nurse or doctor. ! Ask your nurse or doctor about these medications  
  aspirin delayed-release 325 mg tablet  
 diclofenac EC 75 mg EC tablet  
 oxyCODONE IR 5 mg immediate release tablet  
 traMADol 50 mg tablet Discharge Instructions After Hospital Care Plan:  Discharge Instructions Anterior Approach Hip Replacement-Dr. Bobo Castle Patient Alva Heard Date of procedure:3/15/2017 Procedure:Procedure(s): RIGHT TOTAL HIP ANTERIOR APPROACH Surgeon:Surgeon(s) and Role: Vikki Ruby MD - Primary PCP: Leticia Nageotte, MD 
Date of discharge: No discharge date for patient encounter. Follow up appointments 
-follow up with Dr. Diana Garcia in 3 weeks. Call [phone] to make an appointment. Home Health Agency: _______________________   phone: _____________________ The agency will contact you to arrange dates/times for visits. Please call them if you do not hear from them within 24 hours after you are discharged When to call your Orthopaedic Surgeon: 
-Signs of hip dislocation-increased hip pain, unrelieved pain or if you have difficulty or are unable to walk 
-Signs of infection-if your incision is red; continues to have drainage; drainage has a foul odor or if you have a persistent fever over 101 degrees 
-Signs of a blood clot in your leg-calf pain, tenderness, redness, swelling of lower leg When to call your Primary Care Physician: 
-Concerns about medical conditions such as diabetes, high blood pressure, asthma, congestive heart failure 
-Call if blood sugars are elevated, persistent headache or dizziness, coughing or congestion, constipation or diarrhea, burning with urination, abnormal heart rate When to call 911and go to the nearest emergency room 
-acute onset of chest pain, shortness of breath, difficulty breathing Activity 
- weight bearing as tolerated with walker or crutches. Refer to pages 26-36 of your handbook for instructions and pictures 
-20 repetitions of each exercise 3 times each day as instructed by the physical therapist.  Refer to pages 38-45 of our handbook for instructions and pictures 
-get up every one hour and walk (except at night when sleeping) -Avoid extreme movement of hip to prevent dislocation 
-Do not drive or operate heavy machinery. Incision Care -the Aquacel (brown, waterproof) surgical dressing is to remain on your hip for 7 days. On the 7th day have someone gently peel the dressing off by carefully lifting the edge and stretching it slightly to break the adhesive seal and leave incision open to air. You may take a shower with the Aquacel dressing in place. Preventing blood clots  
-Take Aspirin 325 mg twice daily as prescribed  by Dr. Una Pritchard for one month following surgery Pain management - Please take scheduled 650 mg tylenol (acetaminophen) every 6 hours for 4 weeks - Please take scheduled diclofenac 75 mg twice daily for 4 weeks - Please take tramadol every 6 hours for pain as needed for pain. - For breakthrough severe pain please take 2.5-5 mg oxycodone - Avoid alcoholic beverages while taking pain medication - Do not take any over-the-counter medication for pain except Tylenol (acetaminophen) - Please be aware that many medications contain Tylenol. We do not want you to over medicate so please read the information below as a guide. Do not take more than 4 Grams (4000 mg ) of Tylenol in a 24 hour Diet 
-resume usual diet; drink plenty of fluids; eat foods high in fiber 
-Please take a stool softener (such as Senokot-S or Colace) to prevent constipation while you are takingoxycodone. If constipation occurs, take a laxative (such as Dulcolax tablets, Milk of Magnesia, or a suppository). Home Health Care Protocol (to be followed by [address]) Nursing-per physicians order  
-remove Aquacel dressing at 7 days post-op 
-complete head to toe assessment, vital signs 
-medication reconciliation 
-review pain management 
-manage chronic medical conditions 
- call with questions to Dr. Una Pritchard at [phone], extension [phone] Physical Therapy-per physicians order Weight bearing status: 
Precautions at Admission: WBAT (avoid extreme motions) Right Side Weight Bearing: As tolerated Discharge Orders None  
  
Introducing [facility]! Dear Emily Mac: Thank you for requesting a [employer] account. Our records indicate that you already have an active [employer] account. You can access your account anytime at [email or web address] Did you know that you can access your hospital and ER discharge instructions at any time in [employer]? You can also review all of your test results from your hospital stay or ER visit. Additional Information If you have questions, please visit the Frequently Asked Questions section of the [employer] website at [email or web address] Remember, [employer] is NOT to be used for urgent needs. For medical emergencies, dial 911. Now available from your iPhone and Android! General Information Please provide this summary of care documentation to your next provider. Patient Signature:  ____________________________________________________________ Date:  ____________________________________________________________  
  
[facility] Provider Signature:  ____________________________________________________________ Date:  ____________________________________________________________

## 2017-03-15 NOTE — ANESTHESIA POSTPROCEDURE EVALUATION
Post-Anesthesia Evaluation and Assessment    Patient: Edwena Favre MRN: [id number]  SSN: [id number]    YOB: 1955  Age: 62 y.o. Sex: female       Cardiovascular Function/Vital Signs  Visit Vitals    /65    Pulse 86    Temp 35.7 °C (96.2 °F)    Resp 19    Ht 4' 11\" (1.499 m)    Wt 56.5 kg (124 lb 8 oz)    SpO2 98%    BMI 25.15 kg/m2       Patient is status post spinal anesthesia for Procedure(s):  RIGHT TOTAL HIP ANTERIOR APPROACH. Nausea/Vomiting: None    Postoperative hydration reviewed and adequate. Pain:  Pain Scale 1: Numeric (0 - 10) (03/15/17 0959)  Pain Intensity 1: 0 (03/15/17 0959)   Managed    Neurological Status:   Neuro (WDL): Exceptions to WDL (03/15/17 0959)  Neuro  Neurologic State: Alert (03/15/17 0959)  LUE Motor Response: Purposeful (03/15/17 0959)  LLE Motor Response: Numbness (03/15/17 0959)  RUE Motor Response: Purposeful (03/15/17 0959)  RLE Motor Response: Numbness (03/15/17 0959)   At baseline    Mental Status and Level of Consciousness: Arousable    Pulmonary Status:   O2 Device: Room air (03/15/17 0959)   Adequate oxygenation and airway patent    Complications related to anesthesia: None    Post-anesthesia assessment completed.  No concerns    Signed By: Abigail Kim MD     March 15, 2017

## 2017-03-15 NOTE — OP NOTES
OPERATIVE REPORT  RIGHT TOTAL HIP REPLACEMENT (ANTERIOR APPROACH)    NAME: Kory Casas  MRN: [id number]  :  1955  AGE: 62 y.o. DATE OF SURGERY:  3/15/2017    PREOPERATIVE DIAGNOSIS: Severe degenerative joint disease, right hip. POSTOPERATIVE DIAGNOSIS: Severe degenerative joint disease, right hip. PROCEDURES PERFORMED: Right total hip replacement - Anterior approach    SURGEON: Emelina Downey MD.    ASSISTANT: Edmund Elias PA-C    ANESTHESIA: epidural/spinal anesthesia    ESTIMATED BLOOD LOSS: 250 mL. DRAINS: None. COMPLICATIONS: None. SPECIMENS REMOVED: None. PRE-OP ANTIBIOTIC: Ancef 2 gram    IMPLANT:   Implant Name Type Inv. Item Serial No.  Lot No. LRB No. Used Action   CUP ACET SECTOR GRIPTION 48MM -- TI - SN/A  CUP ACET SECTOR GRIPTION 48MM -- TI N/A [facility] [id number] Right 1 Implanted   LINER ACET PINN NEUT 32X48 -- ALTRX - SN/A  LINER ACET PINN NEUT 32X48 -- ALTRX N/A [facility] [id number] Right 1 Implanted   SCR BNE ACET CANC PINN 6.5X35 -- SS - SN/A  SCR BNE ACET CANC PINN 6.5X35 -- SS N/A [facility] [id number] Right 1 Implanted   STEM FEM CORAIL STD NC SZ 9 --  - SN/A  STEM FEM CORAIL STD NC SZ 9 --  N/A [facility] [id number] Right 1 Implanted   HEAD FEM CER 32MM +1MM NK -- DELTA - SN/A   HEAD FEM CER 32MM +1MM NK -- DELTA N/A [facility] [id number] Right 1 Implanted       INDICATIONS: The patient is an 62 yrs female with progressive debilitating right hip and groin pain due to progressive osteoarthritis. Symptoms have progressed despite comprehensive conservative treatment and they present for right total hip replacement. Risks include bleeding, infection, damage to the LFCN, leg length descrepency, blood clots, pulmonary embolism, and death. The patient understood these risks as well as potential benefits and alternatives and elected to proceed. DESCRIPTION OF PROCEDURE: Anesthetic was initiated.  Preoperative dose of intravenous antibiotic was given within 30 minutes of incision. One gram of tranexamic acid was also administered intravenously. 2 grams of tranexamic acid with 0.4mL of epi topically at the end of the case. Villarreal catheter was placed. The right side was confirmed during a timeout and the hip was prepped and draped in the usual sterile fashion. Skin was covered with Ioban occlusive dressing. Direct anterior exposure was made to the patient's hip through the sartorius-tensor interval well lateral of the ASIS to protect the LFCN. Anterior hip vasculature including the ascending branches of the lateral circumflex artery were cauterized. Retractors were taken out to observe for bleeding and there was none. A T capsulotomy was made with bovie electrocautery. The Charnley retractor was repositioned for exposure. The femoral neck was osteotomized. Femoral head was removed from the acetabulum, which was exposed and soft tissues were removed. The acetabulum was progressively  reamedand a 48 mm trial shell was impacted with good press-fit. This was removed and a 48 mm [place] Gription shell was impacted in the acetabulum in 40 degrees of abduction in an anatomic-type anteversion. Bone spurs were removed and 6.5 screws x2 were placed. The polyethylene liner was placed. Femur was positioned and elevated from the wound. Appropriate soft tissue releases were made including the posterior capsule and obturator internus. The medullary canal was entered with a box osteotome. The femoral canal was broached to a size 9. Calcar planed and then trialed. A 32 mm , +1.5 hip ball was the most appropriate for leg length and tension with offset to best match native offset. The hip was dislocated. The trial was removed and the real stem was impacted. The real hip ball was placed. The hip was reduced. After copious irrigation, the capsule was closed with #1 Stratafix barbed sutures.  I irrigated the skin, subcutaneous and deep wound. I closed the fascia of the tensor fascia cayetano with #1 stratafix barbed sutures. Skin and subcutaneous were irrigated. Soft tissues were infiltrated with local anesthetic. 2 grams of tranexamic acid with 0.4 mL of epi given topically in the wound. Dilute betadine (17mL:1000mL of saline) was used to irrigate the wound followed by a rinse with the pulse lavage with normal saline. Skin and subcutaneous were closed in a standard fashion with 2-0 vicryl and 3-0 monocryl followed by Dermabond. An aquacel dressing was applied. There were no complications. No specimen was sent. The procedure was a RIGHT TOTAL HIP REPLACEMENT using a Depuy total hip construct. The patient was transferred to the recovery room in stable condition. An AP pelvis xray was ordered to be completed in the PACU.      Becki Rebollar MD

## 2017-03-15 NOTE — PROGRESS NOTES
TRANSFER - IN REPORT:    Verbal report received from [facility]) on Kory Casas  being received from [employer]) for routine post - op      Report consisted of patients Situation, Background, Assessment and   Recommendations(SBAR). Information from the following report(s) SBAR, Kardex, Intake/Output and MAR was reviewed with the receiving nurse. Opportunity for questions and clarification was provided. Assessment completed upon patients arrival to unit and care assumed.

## 2017-03-15 NOTE — ANESTHESIA PROCEDURE NOTES
Spinal Block    Start time: 3/15/2017 7:32 AM  End time: 3/15/2017 7:40 AM  Performed by: Matt Baeza  Authorized by: MAYO Barriga     Pre-procedure:   Indications: at surgeon's request and primary anesthetic  Preanesthetic Checklist: patient identified, risks and benefits discussed, anesthesia consent, site marked, patient being monitored and timeout performed    Timeout Time: 07:31          Spinal Block:   Patient Position:  Seated  Prep Region:  Lumbar  Prep: Betadine      Location:  L3-4  Technique:  Single shot  Local:  Lidocaine 2%  Local Dose (mL):  2    Needle:   Needle Type:  Pencan  Needle Gauge:  25 G  Attempts:  2      Events: CSF confirmed, no blood with aspiration and no paresthesia        Assessment:  Insertion:  Uncomplicated  Patient tolerance:  Patient tolerated the procedure well with no immediate complications

## 2017-03-15 NOTE — ANESTHESIA PREPROCEDURE EVALUATION
Anesthetic History   No history of anesthetic complications            Review of Systems / Medical History  Patient summary reviewed, nursing notes reviewed and pertinent labs reviewed    Pulmonary  Within defined limits          Asthma        Neuro/Psych   Within defined limits           Cardiovascular  Within defined limits  Hypertension                   GI/Hepatic/Renal  Within defined limits              Endo/Other  Within defined limits      Arthritis     Other Findings              Physical Exam    Airway  Mallampati: II  TM Distance: > 6 cm  Neck ROM: normal range of motion   Mouth opening: Normal     Cardiovascular  Regular rate and rhythm,  S1 and S2 normal,  no murmur, click, rub, or gallop             Dental    Dentition: Full upper dentures     Pulmonary  Breath sounds clear to auscultation               Abdominal  GI exam deferred       Other Findings            Anesthetic Plan    ASA: 2  Anesthesia type: spinal          Induction: Intravenous  Anesthetic plan and risks discussed with: Patient

## 2017-03-15 NOTE — H&P
Date of Surgery Update:  Nikhil Malin was seen and examined. History and physical has been reviewed. The patient has been examined. There have been no significant clinical changes since the completion of the originally dated History and Physical.  Patient identified by surgeon; surgical site was confirmed by patient and surgeon. Risks again discussed including bleeding, infection, damage to surrounding structures including the LFCN, leg length discrepancy, dislocation, blood clots, pulmonary embolism, and anesthetic complication. Patient is aware and wished to proceed.      Signed By: Olivier Pike MD     March 15, 2017 7:23 AM

## 2017-03-15 NOTE — PROGRESS NOTES
Occupational Therapy   Orders received, chart review completed. Note patient POD #0 from [facility]. Per pathway, OT will follow up on POD #1 for evaluation. Recommend OOB to chair three times a day for meals and self-completion of ADLs as able and medically stable. Thank you.      Naldo Ramos OTR/L

## 2017-03-15 NOTE — PROGRESS NOTES
Bedside and Verbal shift change report given to smita (oncoming nurse) by Tura Dance (offgoing nurse). Report included the following information SBAR.

## 2017-03-15 NOTE — PERIOP NOTES
TRANSFER - OUT REPORT:    Verbal report given to Adarsh Jasso RN(name) on Breonna Santoro  being transferred to (unit) for routine post - op       Report consisted of patients Situation, Background, Assessment and   Recommendations(SBAR). Time Pre op antibiotic given:0740  Anesthesia Stop time: 3185    Information from the following report(s) SBAR, OR Summary, Intake/Output, MAR, Recent Results and Med Rec Status was reviewed with the receiving nurse. Opportunity for questions and clarification was provided. Is the patient on 02? NO       L/Min        Other     Is the patient on a monitor? NO    Is the nurse transporting with the patient? NO    Surgical Waiting Area notified of patient's transfer from PACU? YES      The following personal items collected during your admission accompanied patient upon transfer:   Dental Appliance: Dental Appliances: Partials, Uppers  Vision: Visual Aid: Glasses  Hearing Aid:    Jewelry:    Clothing: Clothing:  (bag of clothes,glasses and partials returned in PACU)  Other Valuables:  Other Valuables: Eyeglasses  Valuables sent to safe:

## 2017-03-15 NOTE — PROGRESS NOTES
Primary Nurse Thomas Ames RN and Brandin Bryan RN performed a dual skin assessment on this patient No impairment noted  Mainor score is 21

## 2017-03-16 LAB
ANION GAP BLD CALC-SCNC: 9 MMOL/L (ref 5–15)
BUN SERPL-MCNC: 9 MG/DL (ref 6–20)
BUN/CREAT SERPL: 15 (ref 12–20)
CALCIUM SERPL-MCNC: 7.9 MG/DL (ref 8.5–10.1)
CHLORIDE SERPL-SCNC: 109 MMOL/L (ref 97–108)
CO2 SERPL-SCNC: 23 MMOL/L (ref 21–32)
CREAT SERPL-MCNC: 0.59 MG/DL (ref 0.55–1.02)
GLUCOSE SERPL-MCNC: 101 MG/DL (ref 65–100)
HGB BLD-MCNC: 9.6 G/DL (ref 11.5–16)
POTASSIUM SERPL-SCNC: 3.5 MMOL/L (ref 3.5–5.1)
SODIUM SERPL-SCNC: 141 MMOL/L (ref 136–145)

## 2017-03-16 PROCEDURE — G8988 SELF CARE GOAL STATUS: HCPCS

## 2017-03-16 PROCEDURE — 36415 COLL VENOUS BLD VENIPUNCTURE: CPT | Performed by: ORTHOPAEDIC SURGERY

## 2017-03-16 PROCEDURE — 85018 HEMOGLOBIN: CPT | Performed by: ORTHOPAEDIC SURGERY

## 2017-03-16 PROCEDURE — 97535 SELF CARE MNGMENT TRAINING: CPT

## 2017-03-16 PROCEDURE — 74011250636 HC RX REV CODE- 250/636: Performed by: ORTHOPAEDIC SURGERY

## 2017-03-16 PROCEDURE — 65270000029 HC RM PRIVATE

## 2017-03-16 PROCEDURE — 97165 OT EVAL LOW COMPLEX 30 MIN: CPT

## 2017-03-16 PROCEDURE — G8987 SELF CARE CURRENT STATUS: HCPCS

## 2017-03-16 PROCEDURE — 80048 BASIC METABOLIC PNL TOTAL CA: CPT | Performed by: ORTHOPAEDIC SURGERY

## 2017-03-16 PROCEDURE — 74011250637 HC RX REV CODE- 250/637: Performed by: ORTHOPAEDIC SURGERY

## 2017-03-16 PROCEDURE — 74011250637 HC RX REV CODE- 250/637: Performed by: NURSE PRACTITIONER

## 2017-03-16 PROCEDURE — 97110 THERAPEUTIC EXERCISES: CPT

## 2017-03-16 PROCEDURE — 97116 GAIT TRAINING THERAPY: CPT

## 2017-03-16 RX ORDER — OXYCODONE HYDROCHLORIDE 5 MG/1
5 TABLET ORAL
Qty: 60 TAB | Refills: 0 | Status: SHIPPED | OUTPATIENT
Start: 2017-03-16 | End: 2017-03-17

## 2017-03-16 RX ORDER — ASPIRIN 325 MG
325 TABLET, DELAYED RELEASE (ENTERIC COATED) ORAL 2 TIMES DAILY
Qty: 60 TAB | Refills: 0 | Status: SHIPPED | OUTPATIENT
Start: 2017-03-16 | End: 2017-03-17

## 2017-03-16 RX ORDER — TRAMADOL HYDROCHLORIDE 50 MG/1
50 TABLET ORAL EVERY 6 HOURS
Qty: 60 TAB | Refills: 0 | Status: SHIPPED | OUTPATIENT
Start: 2017-03-16 | End: 2017-03-17

## 2017-03-16 RX ORDER — DICLOFENAC SODIUM 75 MG/1
75 TABLET, DELAYED RELEASE ORAL 2 TIMES DAILY
Qty: 60 TAB | Refills: 0 | Status: SHIPPED | OUTPATIENT
Start: 2017-03-16 | End: 2017-03-17

## 2017-03-16 RX ADMIN — ASPIRIN 325 MG: 325 TABLET, DELAYED RELEASE ORAL at 08:16

## 2017-03-16 RX ADMIN — DICLOFENAC SODIUM 75 MG: 75 TABLET, DELAYED RELEASE ORAL at 19:06

## 2017-03-16 RX ADMIN — ACETAMINOPHEN 650 MG: 325 TABLET, FILM COATED ORAL at 14:45

## 2017-03-16 RX ADMIN — ATORVASTATIN CALCIUM 40 MG: 40 TABLET, FILM COATED ORAL at 08:17

## 2017-03-16 RX ADMIN — ASPIRIN 325 MG: 325 TABLET, DELAYED RELEASE ORAL at 19:06

## 2017-03-16 RX ADMIN — MONTELUKAST SODIUM 10 MG: 10 TABLET, FILM COATED ORAL at 08:17

## 2017-03-16 RX ADMIN — ACETAMINOPHEN 650 MG: 325 TABLET, FILM COATED ORAL at 07:44

## 2017-03-16 RX ADMIN — DEXAMETHASONE SODIUM PHOSPHATE 10 MG: 10 INJECTION, SOLUTION INTRAMUSCULAR; INTRAVENOUS at 07:45

## 2017-03-16 RX ADMIN — DOCUSATE SODIUM AND SENNOSIDES 1 TABLET: 8.6; 5 TABLET, FILM COATED ORAL at 08:17

## 2017-03-16 RX ADMIN — FAMOTIDINE 20 MG: 20 TABLET ORAL at 08:17

## 2017-03-16 RX ADMIN — ACETAMINOPHEN 650 MG: 325 TABLET, FILM COATED ORAL at 19:06

## 2017-03-16 RX ADMIN — POLYETHYLENE GLYCOL 3350 17 G: 17 POWDER, FOR SOLUTION ORAL at 08:18

## 2017-03-16 RX ADMIN — DOXYCYCLINE HYCLATE 50 MG: 100 TABLET, COATED ORAL at 09:48

## 2017-03-16 RX ADMIN — KETOROLAC TROMETHAMINE 30 MG: 30 INJECTION, SOLUTION INTRAMUSCULAR at 11:11

## 2017-03-16 RX ADMIN — KETOROLAC TROMETHAMINE 30 MG: 30 INJECTION, SOLUTION INTRAMUSCULAR at 00:00

## 2017-03-16 RX ADMIN — DOCUSATE SODIUM AND SENNOSIDES 1 TABLET: 8.6; 5 TABLET, FILM COATED ORAL at 19:06

## 2017-03-16 RX ADMIN — TRAMADOL HYDROCHLORIDE 50 MG: 50 TABLET, FILM COATED ORAL at 03:22

## 2017-03-16 RX ADMIN — TRAMADOL HYDROCHLORIDE 50 MG: 50 TABLET, FILM COATED ORAL at 19:06

## 2017-03-16 RX ADMIN — ACETAMINOPHEN 650 MG: 325 TABLET, FILM COATED ORAL at 03:22

## 2017-03-16 RX ADMIN — Medication 10 ML: at 23:00

## 2017-03-16 RX ADMIN — TRAMADOL HYDROCHLORIDE 50 MG: 50 TABLET, FILM COATED ORAL at 14:46

## 2017-03-16 RX ADMIN — FAMOTIDINE 20 MG: 20 TABLET ORAL at 19:06

## 2017-03-16 RX ADMIN — KETOROLAC TROMETHAMINE 30 MG: 30 INJECTION, SOLUTION INTRAMUSCULAR at 05:49

## 2017-03-16 RX ADMIN — TRAMADOL HYDROCHLORIDE 50 MG: 50 TABLET, FILM COATED ORAL at 07:44

## 2017-03-16 RX ADMIN — SODIUM CHLORIDE 125 ML/HR: 900 INJECTION, SOLUTION INTRAVENOUS at 08:16

## 2017-03-16 NOTE — PROGRESS NOTES
Problem: Discharge Planning  Goal: *Discharge to safe environment  Outcome: Progressing Towards Goal  Disposition plan is to discharge home in care of family, home health, and self

## 2017-03-16 NOTE — PROGRESS NOTES
Spiritual Care Partner Volunteer visited patient in [address] [place] [address] on 03/15/17. Documented by:    Simone Mckeon M.S., M.Div.   [address] [place] [address] Jonathon ([id number])

## 2017-03-16 NOTE — PROGRESS NOTES
No new complaints overnight. Denies chest pain or shortness of breath. Tolerating diet well      GEN:  NAD.  AOx3   ABD:  S/NT/ND   RLE:  Dressing C/D/I , 5/5 motor, Calf nttp (Bilat), Sensation rossly intact to light touch throughout, 1+ dp/pt pulses, foot perfused    Patient Vitals for the past 24 hrs:   Temp Pulse Resp BP SpO2   03/16/17 0806 97.8 °F (36.6 °C) 83 16 121/78 97 %   03/16/17 0319 98.5 °F (36.9 °C) 87 16 124/66 98 %   03/15/17 2322 98.5 °F (36.9 °C) 87 16 114/60 96 %   03/15/17 2022 98.3 °F (36.8 °C) 95 16 140/82 97 %   03/15/17 1459 97.9 °F (36.6 °C) 83 16 119/66 97 %   03/15/17 1419 - 96 - 161/81 -   03/15/17 1413 - 96 - 144/81 -   03/15/17 1402 98.4 °F (36.9 °C) 98 17 137/74 100 %   03/15/17 1301 98.3 °F (36.8 °C) 93 16 139/85 99 %   03/15/17 1219 98.3 °F (36.8 °C) 86 15 125/81 98 %   03/15/17 1145 - 77 15 123/67 95 %   03/15/17 1130 - 80 13 120/69 94 %   03/15/17 1115 - 96 15 120/78 98 %   03/15/17 1100 - 90 20 119/67 100 %   03/15/17 1054 - 81 12 120/70 96 %   03/15/17 1030 - 70 11 120/57 97 %   03/15/17 1015 - 72 15 125/62 100 %   03/15/17 1000 - 74 14 126/61 98 %   03/15/17 0959 97.5 °F (36.4 °C) - - - -   03/15/17 0945 - 86 19 125/65 98 %       Current Facility-Administered Medications   Medication Dose Route Frequency    atorvastatin (LIPITOR) tablet 40 mg  40 mg Oral DAILY    hydroCHLOROthiazide (HYDRODIURIL) tablet 12.5 mg  12.5 mg Oral DAILY    montelukast (SINGULAIR) tablet 10 mg  10 mg Oral DAILY    0.9% sodium chloride infusion  125 mL/hr IntraVENous CONTINUOUS    sodium chloride (NS) flush 5-10 mL  5-10 mL IntraVENous Q8H    sodium chloride (NS) flush 5-10 mL  5-10 mL IntraVENous PRN    acetaminophen (TYLENOL) tablet 650 mg  650 mg Oral Q6H    oxyCODONE IR (ROXICODONE) tablet 5 mg  5 mg Oral Q3H PRN    HYDROmorphone (PF) (DILAUDID) injection 0.5 mg  0.5 mg IntraVENous Q4H PRN    naloxone (NARCAN) injection 0.4 mg  0.4 mg IntraVENous PRN    ondansetron (ZOFRAN) injection 4 mg 4 mg IntraVENous Q4H PRN    prochlorperazine (COMPAZINE) with saline injection 5 mg  5 mg IntraVENous Q6H PRN    hydrOXYzine HCl (ATARAX) tablet 10 mg  10 mg Oral Q8H PRN    famotidine (PEPCID) tablet 20 mg  20 mg Oral BID    senna-docusate (PERICOLACE) 8.6-50 mg per tablet 1 Tab  1 Tab Oral BID    polyethylene glycol (MIRALAX) packet 17 g  17 g Oral DAILY    [START ON 3/17/2017] bisacodyl (DULCOLAX) suppository 10 mg  10 mg Rectal DAILY PRN    aspirin delayed-release tablet 325 mg  325 mg Oral BID    ketorolac (TORADOL) injection 30 mg  30 mg IntraVENous Q6H    traMADol (ULTRAM) tablet 50 mg  50 mg Oral Q6H    albuterol (PROVENTIL VENTOLIN) nebulizer solution 2.5 mg  2.5 mg Nebulization Q6H PRN    doxycycline (VIBRA-TABS) tablet 50 mg  50 mg Oral DAILY    losartan (COZAAR) tablet 100 mg  100 mg Oral DAILY    diclofenac EC (VOLTAREN) tablet 75 mg  75 mg Oral BID       Lab Results   Component Value Date/Time    HGB 9.6 03/16/2017 03:29 AM    INR 1.0 03/02/2017 09:09 AM       Lab Results   Component Value Date/Time    Sodium 141 03/16/2017 03:29 AM    Potassium 3.5 03/16/2017 03:29 AM    Chloride 109 03/16/2017 03:29 AM    CO2 23 03/16/2017 03:29 AM    BUN 9 03/16/2017 03:29 AM    Creatinine 0.59 03/16/2017 03:29 AM    Calcium 7.9 03/16/2017 03:29 AM            62 y.o. female s/p right direct anterior total hip arthroplasty on 3/15/2017  . Doing well.      Precautions: None  ABX: Complete 24 hours perioperative abx  PATHWAY: D/C Cherelle per protocol on POD 1  DVT Prophylaxis:  mg BID  Weight Bearing: WBAT RLE   Pain Control: Celebrex, toradol (if Cr normal), Tylenol, scheduled tramadol, oxy 5 mg for breakthrough, dilaudid IV 0.5 mg for secondary breakthrough  Disposition: Home, HHPT      Today vs tomorrow

## 2017-03-16 NOTE — PROGRESS NOTES
Problem: Mobility Impaired (Adult and Pediatric)  Goal: *Acute Goals and Plan of Care (Insert Text)  Physical Therapy Goals  Initiated 3/15/2017    1. Patient will move from supine to sit and sit to supine , scoot up and down and roll side to side in bed with modified independence within 4 days. 2. Patient will perform sit to stand with modified independence within 4 days. 3. Patient will ambulate with modified independence for 150 feet with the least restrictive device within 4 days. 4. Patient will ascend/descend 6 stairs with 2 handrail(s) with modified independence within 4 days. 5. Patient will perform THR home exercise program per protocol with modified independence within 4 days. PHYSICAL THERAPY TREATMENT  Patient: Maria Eugenia Love (40 y.o. female)  Date: 3/16/2017  Diagnosis: DJD  Primary osteoarthritis of right hip <principal problem not specified>  Procedure(s) (LRB):  RIGHT TOTAL HIP ANTERIOR APPROACH (Right) 1 Day Post-Op  Precautions: WBAT (avoid extreme motions)      ASSESSMENT:  Pt demonstrated good safety awareness and improved endurance and gait speed this morning. She ambulated and performed stairs at a supervision level with verbal cues for reminder of step sequence on stairs. She recalled and demonstrated several exercises with minimal cues. Right hip soreness and numbness in thigh were present, but did not significantly limit functional performance. Progression toward goals:  [X]      Improving appropriately and progressing toward goals  [ ]      Improving slowly and progressing toward goals  [ ]      Not making progress toward goals and plan of care will be adjusted       PLAN:  Patient continues to benefit from skilled intervention to address the above impairments. Continue treatment per established plan of care.   Discharge Recommendations:  Home Health  Further Equipment Recommendations for Discharge:  RW already ordered, pt owns [facility]       SUBJECTIVE:   Patient stated The outside of the right thigh is still numb.       OBJECTIVE DATA SUMMARY:   Critical Behavior:  Neurologic State: Alert  Orientation Level: Oriented X4  Cognition: Appropriate decision making, Appropriate safety awareness, Follows commands  Safety/Judgement: Awareness of environment, Insight into deficits, Good awareness of safety precautions  Functional Mobility Training:     Transfers:  Sit to Stand: Supervision  Stand to Sit: Modified independent  Stand Pivot Transfers: Supervision           Balance:  Sitting: Intact  Standing: Intact; With support  Ambulation/Gait Training:  Distance (ft): 250 Feet (ft)  Assistive Device: Walker, rolling;Gait belt  Ambulation - Level of Assistance: Supervision;Stand-by asssistance     Gait Description (WDL): Exceptions to WDL  Gait Abnormalities: Antalgic  Right Side Weight Bearing: As tolerated     Base of Support: Shift to left  Stance: Right decreased  Speed/Janie: Pace decreased (<100 feet/min)  Step Length: Left shortened;Right shortened     Stairs:  Number of Stairs Trained: 8  Stairs - Level of Assistance: Supervision  Rail Use: Both     Therapeutic Exercises:   SUPINE  EXERCISES   Sets   Reps   Active Active Assist   Passive Self ROM   Comments   Ankle Pumps     [ ]                                        [ ]                                        [ ]                                        [ ]                                            Quad Sets     [ ]                                        [ ]                                        [ ]                                        [ ]                                            Heel Slides   20 [X]                                        [ ]                                        [ ]                                        [ ]                                            Hip Abduction   15 [X]                                        [ ]                                        [ ]                                        [ ] Glut Sets   10 [X]                                        [ ]                                        [ ]                                        [ ]                                                  [ ]                                        [ ]                                        [ ]                                        [ ]                                                  [ ]                                        [ ]                                        [ ]                                        [ ]                                                Barry Hopping     [ ]                                        [ ]                                        [ ]                                        [ ]                                            Hip Abduction     [ ]                                        [ ]                                        [ ]                                        [ ]                                                  [ ]                                        [ ]                                        [ ]                                        [ ]                                                  [ ]                                        [ ]                                        [ ]                                        [ ]                                               Pain:  Pain Scale 1: Numeric (0 - 10)  Pain Intensity 1: 0              Activity Tolerance:      Please refer to the flowsheet for vital signs taken during this treatment.   After treatment:   [X] Patient left in no apparent distress sitting up in chair  [ ] Patient left in no apparent distress in bed  [X] Call bell left within reach  [X] Nursing notified  [ ] Caregiver present  [ ] Bed alarm activated      COMMUNICATION/COLLABORATION:   The patients plan of care was discussed with: Occupational Therapist     Olla Gosselin, PT, DPT   Time Calculation: 25 mins

## 2017-03-16 NOTE — ROUTINE PROCESS
Bedside and Verbal shift change report given to Arvind Lincoln RN (oncoming nurse) by Veronia Osler RN (offgoing nurse). Report included the following information SBAR, Kardex, Intake/Output, MAR and Recent Results.

## 2017-03-16 NOTE — PROGRESS NOTES
Problem: Self Care Deficits Care Plan (Adult)  Goal: *Acute Goals and Plan of Care (Insert Text)  Occupational Therapy Goals  Initiated 3/16/2017  1. Patient will perform lower body ADLs using compensatory techniques with modified independence within 7 day(s). 2. Patient will perform upper body ADLs standing 5 mins without fatigue or LOB with modified independence within 7 day(s). 3. Patient will perform toilet transfer with independence within 7 day(s). 4. Patient will perform all aspects of toileting with independence within 7 day(s). 5. Patient will participate in upper extremity therapeutic exercise/activities with independence for 10 minutes within 7 day(s). 6. Patient will utilize energy conservation techniques during functional activities without cues within 7 day(s). OCCUPATIONAL THERAPY EVALUATION  Patient: William Atwood (41 y.o. female)  Date: 3/16/2017  Primary Diagnosis: DJD  Primary osteoarthritis of right hip  Procedure(s) (LRB):  RIGHT TOTAL HIP ANTERIOR APPROACH (Right) 1 Day Post-Op   Precautions:   WBAT (avoid extreme motions)      ASSESSMENT :  Based on the objective data described below, the patient presents with overall modified independence-CGA x1 with RW for functional mobility, up to Min A for lower body ADLs, and independent for upper body ADLs s/p R CURTIS POD 1. Patient received supine in bed, agreeable for OOB activity. Reporting 0/10 pain and continuing to have numbness throughout leg, ortho surgeon present during session. Patient able to complete toilet transfer and all aspects of toileting with overall independence. Patient educated on lower body dressing techniques, demonstrating good understanding and reports she will likely have help from sister upon returning home. Patient may benefit from home health OT services vs none. Patient will benefit from skilled intervention to address the above impairments.   Patients rehabilitation potential is considered to be Good  Factors which may influence rehabilitation potential include:   [ ]             None noted  [ ]             Mental ability/status  [ ]             Medical condition  [ ]             Home/family situation and support systems  [ ]             Safety awareness  [ ]             Pain tolerance/management  [ ]             Other:        PLAN :  Recommendations and Planned Interventions:  [X]               Self Care Training                  [X]        Therapeutic Activities  [X]               Functional Mobility Training    [ ]        Cognitive Retraining  [X]               Therapeutic Exercises           [X]        Endurance Activities  [X]               Balance Training                   [ ]        Neuromuscular Re-Education  [ ]               Visual/Perceptual Training     [X]   Home Safety Training  [X]               Patient Education                 [X]        Family Training/Education  [ ]               Other (comment):     Frequency/Duration: Patient will be followed by occupational therapy 3 times a week to address goals. Discharge Recommendations: None  Further Equipment Recommendations for Discharge: None noted       SUBJECTIVE:   Patient stated I haven't gotten up and walked yet.       OBJECTIVE DATA SUMMARY:   HISTORY:   Past Medical History:   Diagnosis Date    Allergic asthma       SEASONAL AND DOG ALLERGIES    Arthritis      Cyst       removal -groin-dr singer    Hypercholesterolemia 2001    Hypertension 2001    Non-seasonal allergic rhinitis 6-2011     dr Mueller Shoulders Osteoporosis 2006     Past Surgical History:   Procedure Laterality Date    ENDOSCOPY, COLON, DIAGNOSTIC   2007     due 2017    HX GYN   1974     OVARIAN CYST REMOVED    NATALIE BIOPSY BREAST STEREOTACTIC Right 2010     benign        Prior Level of Function/Home Situation: Per patient report, lives at home independently. 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 Active. Independent. Plans to discharge home with sister's assist as needed.   Expanded or extensive additional review of patient history:      Home Situation  Home Environment: Private residence  # Steps to Enter: 6  Rails to Enter: Yes  Hand Rails : Bilateral  One/Two Story Residence: One story  Living Alone: No  Support Systems: Family member(s)  Patient Expects to be Discharged to[de-identified] Private residence  Current DME Used/Available at Home: Clerance Spray, straight  [ ]  Right hand dominant   [ ]  Left hand dominant     EXAMINATION OF PERFORMANCE DEFICITS:  Cognitive/Behavioral Status:  Neurologic State: Alert  Orientation Level: Oriented X4  Cognition: Appropriate decision making; Appropriate for age attention/concentration; Appropriate safety awareness; Follows commands  Perception: Appears intact  Perseveration: No perseveration noted  Safety/Judgement: Awareness of environment; Insight into deficits;Good awareness of safety precautions; Home safety  Skin: Appears intact  Edema: None noted in BUEs  Hearing: Auditory  Auditory Impairment: None  Vision/Perceptual:                           Acuity: Within Defined Limits       Range of Motion:  AROM: Within functional limits  PROM: Within functional limits                    Strength:  Strength: Within functional limits              Coordination:  Coordination: Within functional limits  Fine Motor Skills-Upper: Left Intact; Right Intact    Gross Motor Skills-Upper: Left Intact; Right Intact  Tone & Sensation:  Tone: Normal  Sensation: Intact                       Balance:  Sitting: Intact  Standing: Intact; With support     Functional Mobility and Transfers for ADLs:  Bed Mobility:  Supine to Sit: Contact guard assistance  Scooting: Independent     Transfers:  Sit to Stand: Supervision  Stand to Sit: Independent  Toilet Transfer : Independent     ADL Assessment:  Feeding: Independent     Oral Facial Hygiene/Grooming: Independent     Bathing: Contact guard assistance     Upper Body Dressing: Independent     Lower Body Dressing: Minimum assistance     Toileting: Independent ADL Intervention and task modifications:     Lower Body Dressing Assistance  Socks: Minimum assistance  Leg Crossed Method Used: No  Position Performed: Seated in chair     Toileting  Bladder Hygiene: Independent  Clothing Management: Independent     Cognitive Retraining  Safety/Judgement: Awareness of environment; Insight into deficits;Good awareness of safety precautions; Home safety     Functional Measure:  Barthel Index:      Bathin  Bladder: 10  Bowels: 10  Groomin  Dressin  Feeding: 10  Mobility: 10  Stairs: 0  Toilet Use: 10  Transfer (Bed to Chair and Back): 10  Total: 70         Barthel and G-code impairment scale:  Percentage of impairment CH  0% CI  1-19% CJ  20-39% CK  40-59% CL  60-79% CM  80-99% CN  100%   Barthel Score 0-100 100 99-80 79-60 59-40 20-39 1-19    0   Barthel Score 0-20 20 17-19 13-16 9-12 5-8 1-4 0      The Barthel ADL Index: Guidelines  1. The index should be used as a record of what a patient does, not as a record of what a patient could do. 2. The main aim is to establish degree of independence from any help, physical or verbal, however minor and for whatever reason. 3. The need for supervision renders the patient not independent. 4. A patient's performance should be established using the best available evidence. Asking the patient, friends/relatives and nurses are the usual sources, but direct observation and common sense are also important. However direct testing is not needed. 5. Usually the patient's performance over the preceding 24-48 hours is important, but occasionally longer periods will be relevant. 6. Middle categories imply that the patient supplies over 50 per cent of the effort. 7. Use of aids to be independent is allowed. Sam Zhao., Barthel, D.W. (2628). Functional evaluation: the Barthel Index. [facility] (14)2. LADAN Barajas, Nica Mcmanus., Luis Fernando Villar, [address] [place] [address] ().  Measuring the change indisability after inpatient rehabilitation; comparison of the responsiveness of the Barthel Index and Functional [place] Measure. Journal of Neurology, Neurosurgery, and Psychiatry, 66(4), 796-421. SHAHBAZ Nicholson.CHRIS, ESTUARDO Carver, & Ria Moe M.A. (2004.) Assessment of post-stroke quality of life in cost-effectiveness studies: The usefulness of the Barthel Index and the EuroQoL-5D. Quality of Life Research, 13, 239-26            G codes: In compliance with CMSs Claims Based Outcome Reporting, the following G-code set was chosen for this patient based on their primary functional limitation being treated: The outcome measure chosen to determine the severity of the functional limitation was the Barthel Index with a score of 70/100 which was correlated with the impairment scale. · Self Care:               - CURRENT STATUS:    CJ - 20%-39% impaired, limited or restricted               - GOAL STATUS:           CI - 1%-19% impaired, limited or restricted               - D/C STATUS:                       ---------------To be determined---------------      Occupational Therapy Evaluation Charge Determination   History Examination Decision-Making   LOW Complexity : Brief history review  LOW Complexity : 1-3 performance deficits relating to physical, cognitive , or psychosocial skils that result in activity limitations and / or participation restrictions  MEDIUM Complexity : Patient may present with comorbidities that affect occupational performnce. Miniml to moderate modification of tasks or assistance (eg, physical or verbal ) with assesment(s) is necessary to enable patient to complete evaluation       Based on the above components, the patient evaluation is determined to be of the following complexity level: LOW   Pain:  Pain Scale 1: Numeric (0 - 10)  Pain Intensity 1: 0              Activity Tolerance:   Good. Please refer to the flowsheet for vital signs taken during this treatment.   After treatment:   [X] Patient left in no apparent distress sitting up in chair  [ ] Patient left in no apparent distress in bed  [X] Call bell left within reach  [X] Nursing notified  [ ] Caregiver present  [ ] Bed alarm activated      COMMUNICATION/EDUCATION:   The patients plan of care was discussed with: Physical Therapist and Registered Nurse.  [X] Home safety education was provided and the patient/caregiver indicated understanding. [X] Patient/family have participated as able in goal setting and plan of care. [ ] Patient/family agree to work toward stated goals and plan of care. [ ] Patient understands intent and goals of therapy, but is neutral about his/her participation. [ ] Patient is unable to participate in goal setting and plan of care. This patients plan of care is appropriate for delegation to [facility].      Thank you for this referral.  Jamee Harper OT  Time Calculation: 18 mins

## 2017-03-16 NOTE — PROGRESS NOTES
Problem: Mobility Impaired (Adult and Pediatric)  Goal: *Acute Goals and Plan of Care (Insert Text)  Physical Therapy Goals  Initiated 3/15/2017    1. Patient will move from supine to sit and sit to supine , scoot up and down and roll side to side in bed with modified independence within 4 days. 2. Patient will perform sit to stand with modified independence within 4 days. 3. Patient will ambulate with modified independence for 150 feet with the least restrictive device within 4 days. 4. Patient will ascend/descend 6 stairs with 2 handrail(s) with modified independence within 4 days. 5. Patient will perform THR home exercise program per protocol with modified independence within 4 days. PHYSICAL THERAPY TREATMENT  Patient: Nidia Hernandez (49 y.o. female)  Date: 3/16/2017  Diagnosis: DJD  Primary osteoarthritis of right hip <principal problem not specified>  Procedure(s) (LRB):  RIGHT TOTAL HIP ANTERIOR APPROACH (Right) 1 Day Post-Op  Precautions: WBAT (avoid extreme motions)  ASSESSMENT:  Pt's gait distance, speed and quality continue to improve. She demonstrates safe ambulation on level surfaces with RW, and performed 8 stairs with bilateral handrails. First four stairs using step-to pattern with correct sequence and no VCs required; 2nd four steps using step-over reciprocal pattern with supervision. She also tolerated standing exercises well. Pt will continue to improve functional mobility with HHPT, and is cleared for discharge by PT but will perform one additional session in a.m. prior to D/C to reinforce HEP and stair training. Progression toward goals:  [X]      Improving appropriately and progressing toward goals  [ ]      Improving slowly and progressing toward goals  [ ]      Not making progress toward goals and plan of care will be adjusted       PLAN:  Patient continues to benefit from skilled intervention to address the above impairments.   Continue treatment per established plan of care.  Discharge Recommendations:  Home Health  Further Equipment Recommendations for Discharge:  none       SUBJECTIVE:   I want to make sure I get the recommended PT sessions in before I leave. OBJECTIVE DATA SUMMARY:   Functional Mobility Training:  Bed Mobility:     Supine to Sit: Contact guard assistance     Scooting: Independent        Transfers:  Sit to Stand: Supervision  Stand to Sit: Modified independent  Stand Pivot Transfers: Supervision           Ambulation/Gait Training:  Distance (ft): 300 Feet (ft)  Assistive Device: Gait belt;Walker, rolling  Ambulation - Level of Assistance: Modified independent     Gait Description (WDL): Exceptions to WDL  Gait Abnormalities: Antalgic  Right Side Weight Bearing: As tolerated     Base of Support: Shift to left  Stance: Right decreased  Speed/Janie: Pace decreased (<100 feet/min)  Step Length: Left shortened;Right shortened        Stairs:  Number of Stairs Trained: 8  Stairs - Level of Assistance: Supervision  Rail Use: Both     Therapeutic Exercises:   Exercises performed per protocol. See morning treatment note for description. Pain:  Pain Scale 1: Numeric (0 - 10)  Pain Intensity 1: 0              Activity Tolerance:      Please refer to the flowsheet for vital signs taken during this treatment.   After treatment:   [X] Patient left in no apparent distress sitting up in chair  [ ] Patient left in no apparent distress in bed  [X] Call bell left within reach  [X] Nursing notified  [X] Caregiver present  [ ] Bed alarm activated      COMMUNICATION/COLLABORATION:   The patients plan of care was discussed with: Registered Nurse     Padmini Alberts PT, DPT   Time Calculation: 25 mins

## 2017-03-16 NOTE — PROGRESS NOTES
CM reviewed chart. CM noted pt had right total hip with history of arthritis, cyst, hypercholesterolemia, HTN, and osteoporosis. CM met with pt to introduce self and role and offer support. Bedside assessment completed. CURRENT LIVING SITUATION-  Pt states she lives alone in a private residence. Pt was driving prior to this hospital stay and has assistance with transportation as needed. Pt denies use of assistive devices. Pt is independent with ADL's and IADL's. Pt is currently employed and has a plan for returning to work. Pt's PCP is Dr Dre Robles phone # [phone]. Pt last saw her PCP within the past month. Pt gets her prescriptions filled at [facility].  CM verified with pt her insurance is [employer]. Pt does not have an AMD and declines information. DISCHARGE PLANNING-  Pt denies need for assistance with medications, transportation, and follow up appointments. Disposition plan is to discharge home in care of family, home health, and self. CM offered choice of [facility] provider. Pt wanted to use [facility] care, [facility] is on delay for care until Monday 3/19. CM asked for another choice, referral sent to [facility]. Sadiq Marrufo RN CRM      1400 CM received message from [facility] that pt will need to meet $1000 deductible and after that is met she will have a bill of $20/Visit. CM informed pt of cost.  Pt verbalized understanding. Sadiq Marrufo RN CRM      Care Management Interventions  PCP Verified by CM:  Yes (Dr Dre Robles phone # [phone])  Palliative Care Consult (Criteria: CHF and RRAT>21): No  Mode of Transport at Discharge: Self (Private car)  Transition of Care Consult (CM Consult): [address]: No  Reason Outside [place]: Unable to staff case ([facility])  MyChart Signup: Yes  Physical Therapy Consult: Yes  Occupational Therapy Consult: Yes  Speech Therapy Consult: No  Current Support Network: Own Home, Lives Alone  Confirm Follow Up Transport: Family (Private car)  Plan discussed with Pt/Family/Caregiver: Yes  Freedom of Choice Offered: Yes  Discharge Location  Discharge Placement: Home with home health

## 2017-03-16 NOTE — ADT AUTH CERT NOTES
Notification of inpatient admissions following an elective surgical procedure    Diagnosis code: M16.11    `   Allergies (verified on: 03/15/17)    `     (No Known Allergies)   `          `   Diagnosis    `     None   Jessenia Vance ([id number])  Patient Demographics         Name:  Pinky Pedersen  MRN:  [id number]       Address:  [address] [place] [address]  SSN:             Sex:  Female       Home Phone:  [phone]  YOB: 1955 (62 yrs)       Work Phone:  [phone]  E-Mail Address:  [email or web address]       Registration Status:  Verified  PCP:  Thiago Harrington       Date Last Verified:  8/13/2009  Employer:  [employer]       Next Review Date:  4/1/2017                            Hospital Account       Name:  [facility] Account ID:  [de-identified]     Class:  INPATIENT  Status:  Open     Guarantor:  Pinky Pedersen - [id number]  Primary Coverage:  [employer]     External Hosp Acct ID:  [id number]           Guarantor Account Information                 Account ID - Guarantor  Service Area  Active?   Guarantor Account Type  Guarantor Account Status  Mariam Status            [id number] - ISAIAH REMY O  [address]  Yes  P/F    Verified           Coverages:    UNITED HEALTHCARE/BSHSI [employer] EMPLOYEE PLAN       AETNA/BSHSI [address] [facility]                   Coverage Information         F/O  Payor/Plan  Subscriber Name  Eff From  [facility] To  Mariam Status     3  UNITED HE*/BSHSI UNI*  ISAIAH REMY O  08/06/2009 08/30/2013       4  AETNA/BSHSI AET*  Cloteal Juan O  09/01/2013              Admission Information       Attending Provider:  Olivier Pike MD  Auth/Cert Status:  Incomplete     Admitting Provider:  Olivier Pike MD  Service Area:  [address] [place] [address]     Admission Type:  ELECTIVE  Unit:  [facility] Service:  SURGERY  Room:  558     Admission Date:  3/15/2017  Bed:  01 Discharge Date:

## 2017-03-17 VITALS
BODY MASS INDEX: 25.1 KG/M2 | WEIGHT: 124.5 LBS | HEART RATE: 80 BPM | OXYGEN SATURATION: 98 % | TEMPERATURE: 98.7 F | HEIGHT: 59 IN | SYSTOLIC BLOOD PRESSURE: 126 MMHG | DIASTOLIC BLOOD PRESSURE: 78 MMHG | RESPIRATION RATE: 16 BRPM

## 2017-03-17 LAB — HGB BLD-MCNC: 8.3 G/DL (ref 11.5–16)

## 2017-03-17 PROCEDURE — 74011250637 HC RX REV CODE- 250/637: Performed by: ORTHOPAEDIC SURGERY

## 2017-03-17 PROCEDURE — 85018 HEMOGLOBIN: CPT | Performed by: ORTHOPAEDIC SURGERY

## 2017-03-17 PROCEDURE — 36415 COLL VENOUS BLD VENIPUNCTURE: CPT | Performed by: ORTHOPAEDIC SURGERY

## 2017-03-17 PROCEDURE — 97116 GAIT TRAINING THERAPY: CPT

## 2017-03-17 PROCEDURE — 74011250637 HC RX REV CODE- 250/637: Performed by: NURSE PRACTITIONER

## 2017-03-17 PROCEDURE — 97535 SELF CARE MNGMENT TRAINING: CPT

## 2017-03-17 RX ORDER — DICLOFENAC SODIUM 75 MG/1
75 TABLET, DELAYED RELEASE ORAL 2 TIMES DAILY
Qty: 60 TAB | Refills: 0 | Status: SHIPPED | OUTPATIENT
Start: 2017-03-17 | End: 2017-11-16

## 2017-03-17 RX ORDER — TRAMADOL HYDROCHLORIDE 50 MG/1
50 TABLET ORAL EVERY 6 HOURS
Qty: 60 TAB | Refills: 0 | Status: SHIPPED | OUTPATIENT
Start: 2017-03-17 | End: 2017-09-23

## 2017-03-17 RX ORDER — ASPIRIN 325 MG
325 TABLET, DELAYED RELEASE (ENTERIC COATED) ORAL 2 TIMES DAILY WITH MEALS
Qty: 60 TAB | Refills: 0 | Status: SHIPPED | OUTPATIENT
Start: 2017-03-17 | End: 2017-09-23

## 2017-03-17 RX ORDER — OXYCODONE HYDROCHLORIDE 5 MG/1
2.5-5 TABLET ORAL
Qty: 40 TAB | Refills: 0 | Status: SHIPPED | OUTPATIENT
Start: 2017-03-17 | End: 2017-09-23

## 2017-03-17 RX ADMIN — MONTELUKAST SODIUM 10 MG: 10 TABLET, FILM COATED ORAL at 08:47

## 2017-03-17 RX ADMIN — TRAMADOL HYDROCHLORIDE 50 MG: 50 TABLET, FILM COATED ORAL at 07:07

## 2017-03-17 RX ADMIN — Medication 10 ML: at 07:07

## 2017-03-17 RX ADMIN — ATORVASTATIN CALCIUM 40 MG: 40 TABLET, FILM COATED ORAL at 08:47

## 2017-03-17 RX ADMIN — FAMOTIDINE 20 MG: 20 TABLET ORAL at 08:47

## 2017-03-17 RX ADMIN — TRAMADOL HYDROCHLORIDE 50 MG: 50 TABLET, FILM COATED ORAL at 02:12

## 2017-03-17 RX ADMIN — HYDROCHLOROTHIAZIDE 12.5 MG: 25 TABLET ORAL at 08:47

## 2017-03-17 RX ADMIN — POLYETHYLENE GLYCOL 3350 17 G: 17 POWDER, FOR SOLUTION ORAL at 08:46

## 2017-03-17 RX ADMIN — ASPIRIN 325 MG: 325 TABLET, DELAYED RELEASE ORAL at 08:47

## 2017-03-17 RX ADMIN — ACETAMINOPHEN 650 MG: 325 TABLET, FILM COATED ORAL at 02:11

## 2017-03-17 RX ADMIN — DICLOFENAC SODIUM 75 MG: 75 TABLET, DELAYED RELEASE ORAL at 08:48

## 2017-03-17 RX ADMIN — DOXYCYCLINE HYCLATE 50 MG: 100 TABLET, COATED ORAL at 09:01

## 2017-03-17 RX ADMIN — DOCUSATE SODIUM AND SENNOSIDES 1 TABLET: 8.6; 5 TABLET, FILM COATED ORAL at 08:47

## 2017-03-17 RX ADMIN — LOSARTAN POTASSIUM 100 MG: 50 TABLET ORAL at 08:46

## 2017-03-17 RX ADMIN — ACETAMINOPHEN 650 MG: 325 TABLET, FILM COATED ORAL at 07:07

## 2017-03-17 NOTE — PROGRESS NOTES
Problem: Self Care Deficits Care Plan (Adult)  Goal: *Acute Goals and Plan of Care (Insert Text)  Occupational Therapy Goals  Initiated 3/16/2017  1. Patient will perform lower body ADLs using compensatory techniques with modified independence within 7 day(s). 2. Patient will perform upper body ADLs standing 5 mins without fatigue or LOB with modified independence within 7 day(s). 3. Patient will perform toilet transfer with independence within 7 day(s). 4. Patient will perform all aspects of toileting with independence within 7 day(s). 5. Patient will participate in upper extremity therapeutic exercise/activities with independence for 10 minutes within 7 day(s). 6. Patient will utilize energy conservation techniques during functional activities without cues within 7 day(s). Occupational Therapy Note:      Pt received sitting EOB. Pt agreeable to ADl training but had completed bathing and dressing already this morning. Pt reports she donned clothing with assistance for sock only. Pt was able to stand at sink to complete all bathing and grooming activities. Pt reports pain is well controlled and she is overall feeling confident for discharge home. Pt declined AE training reporting she will have help from her sister at home. Reviewed shower transfers using walker. Pt does have shower chair for home if needed. Discussed home safety with pt as well. Pt with independence with all education provided. A&P:  Pt with no further need for OT intervention. Recommend discharge home with family support and assistance.          Shelbi Ferro OT  Time Calculation: 12 mins

## 2017-03-17 NOTE — PROGRESS NOTES
Problem: Mobility Impaired (Adult and Pediatric)  Goal: *Acute Goals and Plan of Care (Insert Text)  Physical Therapy Goals  Initiated 3/15/2017    1. Patient will move from supine to sit and sit to supine , scoot up and down and roll side to side in bed with modified independence within 4 days. 2. Patient will perform sit to stand with modified independence within 4 days. 3. Patient will ambulate with modified independence for 150 feet with the least restrictive device within 4 days. 4. Patient will ascend/descend 6 stairs with 2 handrail(s) with modified independence within 4 days. 5. Patient will perform THR home exercise program per protocol with modified independence within 4 days. PHYSICAL THERAPY TREATMENT  Patient: Emily Benoit (41 y.o. female)  Date: 3/17/2017  Diagnosis: DJD  Primary osteoarthritis of right hip <principal problem not specified>  Procedure(s) (LRB):  RIGHT TOTAL HIP ANTERIOR APPROACH (Right) 2 Days Post-Op  Precautions: WBAT (avoid extreme motions)      ASSESSMENT:  Patient continues to progress. Mod I for transfers with RW. Ambulated around unit with RW, mod I without LOB. Patient lightly using RW for support during gait. Navigated 4 stairs with B railings, step to pattern, mod I. She has no questions regarding exercises or mobility. Cleared for discharge from PT standpoint, RN aware. Recommend HHPT. Patient is cleared for discharge from PT standpoint:  YES [X]     NO [ ]      Progression toward goals:  [X]      Improving appropriately and progressing toward goals  [ ]      Improving slowly and progressing toward goals  [ ]      Not making progress toward goals and plan of care will be adjusted       PLAN:  Patient continues to benefit from skilled intervention to address the above impairments. Continue treatment per established plan of care. Discharge Recommendations:  Home Health  Further Equipment Recommendations for Discharge:   Owns all       SUBJECTIVE:   Patient stated I will call my ride.          OBJECTIVE DATA SUMMARY:   Critical Behavior:  Neurologic State: Alert  Orientation Level: Oriented X4  Cognition: Follows commands  Safety/Judgement: Awareness of environment, Insight into deficits, Good awareness of safety precautions  Functional Mobility Training:  Bed Mobility:                    Transfers:  Sit to Stand: Modified independent  Stand to Sit: Modified independent  Stand Pivot Transfers: Modified independent                          Balance:  Sitting: Intact  Standing: Intact  Ambulation/Gait Training:  Distance (ft): 350 Feet (ft)  Assistive Device: Walker, rolling  Ambulation - Level of Assistance: Modified independent           Right Side Weight Bearing: As tolerated     Base of Support: Narrowed  Stance: Right decreased                                     Stairs:  Number of Stairs Trained: 4  Stairs - Level of Assistance: Modified independent  Rail Use: Both           Pain:  Pain Scale 1: Numeric (0 - 10)  Pain Intensity 1: 0              Activity Tolerance:   Good  Please refer to the flowsheet for vital signs taken during this treatment.   After treatment:   [X]  Patient left in no apparent distress sitting up in chair  [ ]  Patient left in no apparent distress in bed  [X]  Call bell left within reach  [X]  Nursing notified  [ ]  Caregiver present  [ ]  Bed alarm activated      COMMUNICATION/COLLABORATION:   The patients plan of care was discussed with: Registered Nurse     Cory Warren, PT   Time Calculation: 11 mins

## 2017-03-17 NOTE — PROGRESS NOTES
Bedside and Verbal shift change report given to Jayne Frazier (oncoming nurse) by Dg Curran (offgoing nurse). Report included the following information SBAR, Kardex, Intake/Output, MAR and Accordion.

## 2017-03-17 NOTE — PROGRESS NOTES
Bedside shift change report given to JUANA Rodriguez (oncoming nurse) by Jsoe Turner (offgoing nurse). Report included the following information SBAR and Kardex.

## 2017-03-17 NOTE — DISCHARGE INSTRUCTIONS
After Hospital Care Plan:  Discharge Instructions Anterior Approach   Hip Replacement-Dr. Boyd  Patient Carmen Toussaint  Date of procedure:3/15/2017    Procedure:Procedure(s):  RIGHT TOTAL HIP ANTERIOR APPROACH  Surgeon:Surgeon(s) and Role:     * John Paul Vidal MD - Primary   PCP: Florian Mahmood MD  Date of discharge: No discharge date for patient encounter. Follow up appointments  -follow up with Dr. Abdiel Roa in 3 weeks. Call [phone] to make an appointment. [facility]: _______________________   phone: _____________________  The agency will contact you to arrange dates/times for visits. Please call them if you do not hear from them within 24 hours after you are discharged    When to call your Orthopaedic Surgeon:  -Signs of hip dislocation-increased hip pain, unrelieved pain or if you have difficulty or are unable to walk  -Signs of infection-if your incision is red; continues to have drainage; drainage has a foul odor or if you have a persistent fever over 101 degrees  -Signs of a blood clot in your leg-calf pain, tenderness, redness, swelling of lower leg    When to call your Primary Care Physician:  -Concerns about medical conditions such as diabetes, high blood pressure, asthma, congestive heart failure  -Call if blood sugars are elevated, persistent headache or dizziness, coughing or congestion, constipation or diarrhea, burning with urination, abnormal heart rate    When to call 911and go to the nearest emergency room  -acute onset of chest pain, shortness of breath, difficulty breathing    Activity  - weight bearing as tolerated with walker or crutches.  Refer to pages 26-36 of your handbook for instructions and pictures  -20 repetitions of each exercise 3 times each day as instructed by the physical therapist.  Refer to pages 38-45 of our handbook for instructions and pictures  -get up every one hour and walk (except at night when sleeping)  -Avoid extreme movement of hip to prevent dislocation  -Do not drive or operate heavy machinery. Incision Care  -the Aquacel (brown, waterproof) surgical dressing is to remain on your hip for 7 days. On the 7th day have someone gently peel the dressing off by carefully lifting the edge and stretching it slightly to break the adhesive seal and leave incision open to air. You may take a shower with the Aquacel dressing in place. Preventing blood clots   -Take Aspirin 325 mg twice daily as prescribed  by Dr. Enrique Talavera for one month following surgery      Pain management  - Please take scheduled 650 mg tylenol (acetaminophen) every 6 hours for 4 weeks  - Please take scheduled diclofenac 75 mg twice daily for 4 weeks  - Please take tramadol every 6 hours for pain as needed for pain. - For breakthrough severe pain please take 2.5-5 mg oxycodone     - Avoid alcoholic beverages while taking pain medication  - Do not take any over-the-counter medication for pain except Tylenol (acetaminophen)  - Please be aware that many medications contain Tylenol. We do not want you to over medicate so please read the information below as a guide. Do not take more than 4 Grams (4000 mg ) of Tylenol in a 24 hour        Diet  -resume usual diet; drink plenty of fluids; eat foods high in fiber  -Please take a stool softener (such as Senokot-S or Colace) to prevent constipation while you are takingoxycodone. If constipation occurs, take a laxative (such as Dulcolax tablets, Milk of Magnesia, or a suppository).     Home Health Care Protocol (to be followed by [address])    Nursing-per physicians order   -remove Aquacel dressing at 7 days post-op  -complete head to toe assessment, vital signs  -medication reconciliation  -review pain management  -manage chronic medical conditions  - call with questions to Dr. Enrique Talavera at [phone], extension 1300 [address] physicians order    Weight bearing status:  Precautions at Admission: WBAT (avoid extreme motions)     Right Side Weight Bearing: As tolerated

## 2017-03-17 NOTE — PROGRESS NOTES
No new complaints overnight. GEN:  NAD.  AOx3   ABD:  S/NT/ND   RLE:  Dressing C/D/I , 5/5 motor, Calf nttp (Bilat), Sensation rossly intact to light touch throughout, 1+ dp/pt pulses, foot perfused    Patient Vitals for the past 24 hrs:   Temp Pulse Resp BP SpO2   03/17/17 0843 98.7 °F (37.1 °C) 80 16 126/78 98 %   03/17/17 0210 97.5 °F (36.4 °C) 69 16 107/69 98 %   03/16/17 2049 98.3 °F (36.8 °C) 90 16 120/70 100 %   03/16/17 1529 98.8 °F (37.1 °C) 86 16 152/79 99 %       Current Facility-Administered Medications   Medication Dose Route Frequency    atorvastatin (LIPITOR) tablet 40 mg  40 mg Oral DAILY    hydroCHLOROthiazide (HYDRODIURIL) tablet 12.5 mg  12.5 mg Oral DAILY    montelukast (SINGULAIR) tablet 10 mg  10 mg Oral DAILY    sodium chloride (NS) flush 5-10 mL  5-10 mL IntraVENous Q8H    sodium chloride (NS) flush 5-10 mL  5-10 mL IntraVENous PRN    acetaminophen (TYLENOL) tablet 650 mg  650 mg Oral Q6H    oxyCODONE IR (ROXICODONE) tablet 5 mg  5 mg Oral Q3H PRN    naloxone (NARCAN) injection 0.4 mg  0.4 mg IntraVENous PRN    hydrOXYzine HCl (ATARAX) tablet 10 mg  10 mg Oral Q8H PRN    famotidine (PEPCID) tablet 20 mg  20 mg Oral BID    senna-docusate (PERICOLACE) 8.6-50 mg per tablet 1 Tab  1 Tab Oral BID    polyethylene glycol (MIRALAX) packet 17 g  17 g Oral DAILY    bisacodyl (DULCOLAX) suppository 10 mg  10 mg Rectal DAILY PRN    aspirin delayed-release tablet 325 mg  325 mg Oral BID    traMADol (ULTRAM) tablet 50 mg  50 mg Oral Q6H    albuterol (PROVENTIL VENTOLIN) nebulizer solution 2.5 mg  2.5 mg Nebulization Q6H PRN    doxycycline (VIBRA-TABS) tablet 50 mg  50 mg Oral DAILY    losartan (COZAAR) tablet 100 mg  100 mg Oral DAILY    diclofenac EC (VOLTAREN) tablet 75 mg  75 mg Oral BID       Lab Results   Component Value Date/Time    HGB 8.3 03/17/2017 02:13 AM    INR 1.0 03/02/2017 09:09 AM       Lab Results   Component Value Date/Time    Sodium 141 03/16/2017 03:29 AM Potassium 3.5 03/16/2017 03:29 AM    Chloride 109 03/16/2017 03:29 AM    CO2 23 03/16/2017 03:29 AM    BUN 9 03/16/2017 03:29 AM    Creatinine 0.59 03/16/2017 03:29 AM    Calcium 7.9 03/16/2017 03:29 AM            62 y.o. female s/p right direct anterior total hip arthroplasty on 3/15/2017  . Doing well. Acute blood loss anemia- stable.      Precautions: None  ABX: Complete 24 hours perioperative abx  PATHWAY: D/C [place] per protocol on POD 1  DVT Prophylaxis:  mg BID  Weight Bearing: WBAT RLE   Pain Control: Celebrex, toradol (if Cr normal), Tylenol, scheduled tramadol, oxy 5 mg for breakthrough, dilaudid IV 0.5 mg for secondary breakthrough  Disposition: Home, HHPT      Today

## 2017-03-18 ENCOUNTER — HOME HEALTH ADMISSION (OUTPATIENT)
Dept: HOME HEALTH SERVICES | Facility: HOME HEALTH | Age: 62
End: 2017-03-18
Payer: COMMERCIAL

## 2017-03-18 ENCOUNTER — HOME CARE VISIT (OUTPATIENT)
Dept: SCHEDULING | Facility: HOME HEALTH | Age: 62
End: 2017-03-18
Payer: COMMERCIAL

## 2017-03-18 VITALS
OXYGEN SATURATION: 96 % | TEMPERATURE: 98 F | SYSTOLIC BLOOD PRESSURE: 130 MMHG | RESPIRATION RATE: 16 BRPM | DIASTOLIC BLOOD PRESSURE: 80 MMHG | HEART RATE: 88 BPM

## 2017-03-18 PROCEDURE — G0151 HHCP-SERV OF PT,EA 15 MIN: HCPCS

## 2017-03-18 NOTE — PROGRESS NOTES
Received call from pt regarding start date of homecare services. Pt is asking when will services start. Advised that services normally start on today, the first full day of discharge. She advised that she wouldn't work with a male therapist from [facility] and does not wish to have services with this agency. She stated she only trusted [facility] and prefers to wait until Monday to start services with [facility]. CM called [facility] as initially they could accept but couldn't start services until Monday. Outcome pending. MARIA Marte      10:45am  Received call from [facility] and pt can be accepted for therapy to start tomorrow, Sunday. Tammy, from [facility] intake will call pt on cell, phone [phone]. CM also called [facility] back to ensure that they are aware that pt doesn't want services with their agency.     MARIA Marte

## 2017-03-20 ENCOUNTER — HOME CARE VISIT (OUTPATIENT)
Dept: SCHEDULING | Facility: HOME HEALTH | Age: 62
End: 2017-03-20
Payer: COMMERCIAL

## 2017-03-20 VITALS
HEART RATE: 90 BPM | OXYGEN SATURATION: 98 % | DIASTOLIC BLOOD PRESSURE: 75 MMHG | RESPIRATION RATE: 16 BRPM | SYSTOLIC BLOOD PRESSURE: 122 MMHG

## 2017-03-20 PROCEDURE — G0151 HHCP-SERV OF PT,EA 15 MIN: HCPCS

## 2017-03-23 ENCOUNTER — HOME CARE VISIT (OUTPATIENT)
Dept: SCHEDULING | Facility: HOME HEALTH | Age: 62
End: 2017-03-23
Payer: COMMERCIAL

## 2017-03-23 VITALS — RESPIRATION RATE: 18 BRPM | OXYGEN SATURATION: 96 % | TEMPERATURE: 98.4 F | HEART RATE: 105 BPM

## 2017-03-23 PROCEDURE — G0157 HHC PT ASSISTANT EA 15: HCPCS

## 2017-03-27 NOTE — DISCHARGE SUMMARY
Discharge Summary     Patient ID:  Pipe Felipe  [id number]  58 y.o.  1955    Admit Date: 3/15/2017    Discharge Date: 3/27/2017    Admission Diagnoses: DJD  Primary osteoarthritis of right hip    Discharge Diagnoses: Active Problems:    Primary osteoarthritis of right hip (3/15/2017)         Admission Condition: Good    Discharge Condition: Good    Last Procedure: Procedure(s):  RIGHT TOTAL HIP ANTERIOR APPROACH      Medications:   No current facility-administered medications for this encounter. Current Outpatient Prescriptions   Medication Sig    aspirin delayed-release 325 mg tablet Take 1 Tab by mouth two (2) times daily (with meals). Indications: For prevention of Blood Clots after Total Hip Replacement    diclofenac EC (VOLTAREN) 75 mg EC tablet Take 1 Tab by mouth two (2) times a day.  traMADol (ULTRAM) 50 mg tablet Take 1 Tab by mouth every six (6) hours. Max Daily Amount: 200 mg. Indications: Postoperative Incisional Pain    oxyCODONE IR (ROXICODONE) 5 mg immediate release tablet Take 0.5-1 Tabs by mouth every four (4) hours as needed. Max Daily Amount: 30 mg. Indications: Severe Incisional Hip Pain    atorvastatin (LIPITOR) 40 mg tablet Take 40 mg by mouth daily.  montelukast (SINGULAIR) 10 mg tablet Take 10 mg by mouth daily.  doxycycline (ADOXA) 50 mg tablet Take 50 mg by mouth daily.  B.infantis-B.ani-B.long-B.bifi (PROBIOTIC 4X) 10-15 mg TbEC Take  by mouth daily.  chlorhexidine (PERIDEX) 0.12 % solution 15 mL by Swish and Spit route two (2) times a day.  Hydrochlorothiazide (HYDRODIURIL) 12.5 mg tablet Take 1 tablet by mouth daily. Needs to schedule ov before further refills. Indications: HYPERTENSION    irbesartan (AVAPRO) 300 mg tablet Take 1 Tab by mouth daily.  cholecalciferol, vitamin d3, (VITAMIN D) 1,000 unit tablet Take  by mouth daily.     albuterol (PROVENTIL HFA, VENTOLIN HFA, PROAIR HFA) 90 mcg/actuation inhaler Take 2 puffs by inhalation every six (6) hours as needed for Wheezing. Hospital Course: The patient was admitted to the hospital on the day pf surgery and underwent direct anterior total hip arthroplasty. They tolerated the procedure well. Pain was controlled post-operatively with a multimodal pain regiment including celebrex, tylenol,tramadol,  toradol (if normal creatinine) and oxycodone for breakthrough. The fatima catheter was removed on POD#1. Physical therapy began to work with the patient on POD#0 and continued daily. 24 hours of perioperative antibiotics were completed. Aspirin was given for DVT prophylaxis beginning on POD#0. The patient progressed well and was discharged home in stable condition once they cleared therapy. Consults: None    Significant Diagnostic Studies: post op xrays showed a stable Procedure(s):  RIGHT TOTAL HIP ANTERIOR APPROACH    Disposition: home    The patient was discharged with the following instructions:   1.) She will take aspirin for DVT prophylaxis, tylenol, celebrex, and oxycodone for breakthrough pain. 2.) Shower and wound instructions were given. 3.) Activity: Activity as tolerated  4.) Diet: Regular      Follow-up with Heladio Etienne MD in 3 weeks after her discharge. Sooner if there is a problem. Cannot display discharge medications since this patient is not currently admitted.       Signed:  Heladio Etienne MD  3/27/2017, 8:37 AM

## 2017-03-28 ENCOUNTER — HOME CARE VISIT (OUTPATIENT)
Dept: SCHEDULING | Facility: HOME HEALTH | Age: 62
End: 2017-03-28
Payer: COMMERCIAL

## 2017-03-28 VITALS — SYSTOLIC BLOOD PRESSURE: 112 MMHG | DIASTOLIC BLOOD PRESSURE: 70 MMHG

## 2017-03-28 PROCEDURE — G0151 HHCP-SERV OF PT,EA 15 MIN: HCPCS

## 2017-03-30 ENCOUNTER — HOME CARE VISIT (OUTPATIENT)
Dept: SCHEDULING | Facility: HOME HEALTH | Age: 62
End: 2017-03-30
Payer: COMMERCIAL

## 2017-03-30 VITALS — DIASTOLIC BLOOD PRESSURE: 70 MMHG | SYSTOLIC BLOOD PRESSURE: 112 MMHG

## 2017-03-30 PROCEDURE — G0151 HHCP-SERV OF PT,EA 15 MIN: HCPCS

## 2017-04-03 ENCOUNTER — HOME CARE VISIT (OUTPATIENT)
Dept: SCHEDULING | Facility: HOME HEALTH | Age: 62
End: 2017-04-03
Payer: COMMERCIAL

## 2017-04-03 VITALS — SYSTOLIC BLOOD PRESSURE: 115 MMHG | DIASTOLIC BLOOD PRESSURE: 70 MMHG

## 2017-04-03 PROCEDURE — G0151 HHCP-SERV OF PT,EA 15 MIN: HCPCS

## 2017-04-06 ENCOUNTER — HOME CARE VISIT (OUTPATIENT)
Dept: SCHEDULING | Facility: HOME HEALTH | Age: 62
End: 2017-04-06
Payer: COMMERCIAL

## 2017-04-06 VITALS — DIASTOLIC BLOOD PRESSURE: 70 MMHG | SYSTOLIC BLOOD PRESSURE: 122 MMHG

## 2017-04-06 PROCEDURE — G0151 HHCP-SERV OF PT,EA 15 MIN: HCPCS

## 2017-06-05 ENCOUNTER — HOSPITAL ENCOUNTER (OUTPATIENT)
Dept: MAMMOGRAPHY | Age: 62
Discharge: HOME | End: 2017-06-05
Attending: OBSTETRICS & GYNECOLOGY
Payer: COMMERCIAL

## 2017-06-05 DIAGNOSIS — N63.0 BREAST SWELLING: ICD-10-CM

## 2017-06-05 PROCEDURE — 77065 DX MAMMO INCL CAD UNI: CPT

## 2017-06-05 NOTE — PROGRESS NOTES
I spoke with Deborah Chau at Dr Andreea Machado office she stated that they faxed it on the first of June, but she is re faxing it now.    dsw

## 2017-09-23 ENCOUNTER — HOSPITAL ENCOUNTER (EMERGENCY)
Age: 62
Discharge: HOME | End: 2017-09-23
Attending: FAMILY MEDICINE

## 2017-09-23 ENCOUNTER — APPOINTMENT (OUTPATIENT)
Dept: GENERAL RADIOLOGY | Age: 62
End: 2017-09-23
Attending: FAMILY MEDICINE

## 2017-09-23 VITALS
BODY MASS INDEX: 26.21 KG/M2 | TEMPERATURE: 97 F | OXYGEN SATURATION: 98 % | SYSTOLIC BLOOD PRESSURE: 170 MMHG | HEART RATE: 90 BPM | RESPIRATION RATE: 16 BRPM | WEIGHT: 130 LBS | DIASTOLIC BLOOD PRESSURE: 79 MMHG | HEIGHT: 59 IN

## 2017-09-23 DIAGNOSIS — M79.645 THUMB PAIN, LEFT: Primary | ICD-10-CM

## 2017-09-23 DIAGNOSIS — M25.511 ACUTE PAIN OF RIGHT SHOULDER: ICD-10-CM

## 2017-09-23 RX ORDER — GUAIFENESIN 100 MG/5ML
81 LIQUID (ML) ORAL DAILY
COMMUNITY
End: 2017-11-16

## 2017-09-23 NOTE — DISCHARGE INSTRUCTIONS
Shoulder Stretches: Exercises  Your Care Instructions  Here are some examples of exercises for your shoulder. Start each exercise slowly. Ease off the exercise if you start to have pain. Your doctor or physical therapist will tell you when you can start these exercises and which ones will work best for you. How to do the exercises  Note: These exercises should cause you to feel a gentle stretch, but no pain. Shoulder stretch    1.  a doorway and place one arm against the door frame. Your elbow should be a little higher than your shoulder. 2. Relax your shoulders as you lean forward, allowing your chest and shoulder muscles to stretch. You can also turn your body slightly away from your arm to stretch the muscles even more. 3. Hold for 15 to 30 seconds. 4. Repeat 2 to 4 times with each arm. Shoulder and chest stretch    Shoulder and chest stretch  1. While sitting, relax your upper body so you slump slightly in your chair. 2. As you breathe in, straighten your back and open your arms out to the sides. 3. Gently pull your shoulder blades back and downward. 4. Hold for 15 to 30 seconds as your breathe normally. 5. Repeat 2 to 4 times. Overhead stretch    1. Reach up over your head with both arms. 2. Hold for 15 to 30 seconds. 3. Repeat 2 to 4 times. Follow-up care is a key part of your treatment and safety. Be sure to make and go to all appointments, and call your doctor if you are having problems. It's also a good idea to know your test results and keep a list of the medicines you take. Where can you learn more? Go to http://nuha-kem.info/. Enter S254 in the search box to learn more about \"Shoulder Stretches: Exercises. \"  Current as of: March 21, 2017  Content Version: 11.3  © 6033-1149 [employer]. Care instructions adapted under license by [employer] (which disclaims liability or warranty for this information).  If you have questions about a medical condition or this instruction, always ask your healthcare professional. [address] any warranty or liability for your use of this information. Hand Pain: Care Instructions  Your Care Instructions  Common causes of hand pain are overuse and injuries, such as might happen during sports or home repair projects. Everyday wear and tear, especially as you get older, also can cause hand pain. Most minor hand injuries will heal on their own, and home treatment is usually all you need to do. If you have sudden and severe pain, you may need tests and treatment. Follow-up care is a key part of your treatment and safety. Be sure to make and go to all appointments, and call your doctor if you are having problems. Its also a good idea to know your test results and keep a list of the medicines you take. How can you care for yourself at home? · Take pain medicines exactly as directed. ¨ If the doctor gave you a prescription medicine for pain, take it as prescribed. ¨ If you are not taking a prescription pain medicine, ask your doctor if you can take an over-the-counter medicine. · Rest and protect your hand. Take a break from any activity that may cause pain. · Put ice or a cold pack on your hand for 10 to 20 minutes at a time. Put a thin cloth between the ice and your skin. · Prop up the sore hand on a pillow when you ice it or anytime you sit or lie down during the next 3 days. Try to keep it above the level of your heart. This will help reduce swelling. · If your doctor recommends a sling, splint, or elastic bandage to support your hand, wear it as directed. When should you call for help? Call 911 anytime you think you may need emergency care. For example, call if:  · Your hand turns cool or pale or changes color. Call your doctor now or seek immediate medical care if:  · You cannot move your hand.   · Your hand pops, moves out of its normal position, and then returns to its normal position. · You have signs of infection, such as:  ¨ Increased pain, swelling, warmth, or redness. ¨ Red streaks leading from the sore area. ¨ Pus draining from a place on your hand. ¨ A fever. · Your hand feels numb or tingly. Watch closely for changes in your health, and be sure to contact your doctor if:  · Your hand feels unstable when you try to use it. · You do not get better as expected. · You have any new symptoms, such as swelling. · Bruises from an injury to your hand last longer than 2 weeks. Where can you learn more? Go to http://nuha-kem.info/. Enter R273 in the search box to learn more about \"Hand Pain: Care Instructions. \"  Current as of: March 20, 2017  Content Version: 11.3  © 3167-6007 [employer]. Care instructions adapted under license by [employer] (which disclaims liability or warranty for this information). If you have questions about a medical condition or this instruction, always ask your healthcare professional. [place] [address] any warranty or liability for your use of this information.

## 2017-09-23 NOTE — UC PROVIDER NOTE
Patient is a 62 y.o. female presenting with finger pain and shoulder pain. The history is provided by the patient. Finger Pain    This is a new problem. Episode onset: 3 weeks ago; left thumb pain at IP joint worse in the morning - improves throughout day. The problem occurs constantly. The problem has not changed since onset. The quality of the pain is described as aching. The pain is mild. Associated symptoms include limited range of motion and stiffness. Pertinent negatives include no numbness and no tingling. The symptoms are aggravated by movement. She has tried OTC pain medications (tylenol) for the symptoms. The treatment provided mild relief. Shoulder Pain    Incident onset: 3 weeks ago. There was no injury mechanism. The right shoulder is affected. The pain is mild. The pain has been constant since onset. The pain does not radiate. There is no history of shoulder injury. She has no other injuries. There is no history of shoulder surgery. Pertinent negatives include no numbness, no muscle weakness and no tingling.         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    Hypercholesterolemia 2001    Hypertension 2001    Non-seasonal allergic rhinitis 6-2011    dr Krissy Faustin    Osteoporosis 2006        Past Surgical History:   Procedure Laterality Date    ENDOSCOPY, COLON, DIAGNOSTIC  2007    due 2017    HX GYN  1974    OVARIAN CYST REMOVED    HX ORTHOPAEDIC      R hip replacement    NATALIE BIOPSY BREAST STEREOTACTIC Right 2010    benign         Family History   Problem Relation Age of Onset    Breast Cancer Maternal Aunt     Dementia Mother     Hypertension Mother     High Cholesterol Mother     Diabetes Mother     Cancer Father      COLON    Hypertension Sister     Diabetes Sister     Hypertension Brother     Hypertension Sister     High Cholesterol Sister     Hypertension Brother    Boss Anesth Problems Neg Hx         Social History     Social History    Marital status:      Spouse name: N/A    Number of children: N/A    Years of education: N/A     Occupational History    Not on file. Social History Main Topics    Smoking status: Current Every Day Smoker     Packs/day: 0.30     Years: 37.00     Types: Cigarettes    Smokeless tobacco: Never Used    Alcohol use No    Drug use: No    Sexual activity: Yes     Partners: Male     Other Topics Concern    Not on file     Social History Narrative                ALLERGIES: Review of patient's allergies indicates no known allergies. Review of Systems   Constitutional: Negative for chills and fever. Respiratory: Negative for shortness of breath and wheezing. Cardiovascular: Negative for chest pain and palpitations. Gastrointestinal: Negative for nausea and vomiting. Musculoskeletal: Positive for arthralgias and stiffness. Skin: Negative for rash. Neurological: Negative for tingling, weakness and numbness. Vitals:    09/23/17 1031   BP: 170/79   Pulse: 90   Resp: 16   Temp: 97 °F (36.1 °C)   SpO2: 98%   Weight: 59 kg (130 lb)   Height: 4' 11\" (1.499 m)       Physical Exam   Constitutional: She appears well-developed and well-nourished. No distress. Musculoskeletal:        Right shoulder: She exhibits tenderness, bony tenderness (right clavicle) and pain. She exhibits normal range of motion, no swelling, no effusion, no crepitus, no deformity, no laceration, no spasm, normal pulse and normal strength. Left hand: She exhibits decreased range of motion, tenderness and bony tenderness. She exhibits normal two-point discrimination, normal capillary refill, no deformity, no laceration and no swelling. Normal sensation noted. Normal strength noted. Hands:  Neurological: She is alert. Skin: She is not diaphoretic. Psychiatric: She has a normal mood and affect.  Her behavior is normal. Judgment and thought content normal.   Nursing note and vitals reviewed. MDM     Differential Diagnosis; Clinical Impression; Plan:     CLINICAL IMPRESSION:  Thumb pain, left  (primary encounter diagnosis)  Acute pain of right shoulder    Plan:  1. Tylenol prn  2. F/u with ortho - may benefit from intraarticular injection L-IP  Amount and/or Complexity of Data Reviewed:   Tests in the radiology section of CPT®:  Ordered and reviewed  Risk of Significant Complications, Morbidity, and/or Mortality:   Presenting problems: Moderate  Diagnostic procedures: Moderate  Management options: Moderate  Progress:   Patient progress:  Stable      Procedures    Study Result      EXAM:  XR THUMB LT MIN 2 V     INDICATION: Left thumb pain and popping for 2 weeks     COMPARISON: None.     FINDINGS: Three views of the left thumb demonstrate no fracture. There is IP  osteoarthritis. A vague 1 mm calcification is noted along the medial capsule,  with associated soft tissue swelling. However, a donor site is not seen.     IMPRESSION  IMPRESSION:     Left IP osteoarthritis with medial soft tissue swelling. Please correlate with  ligament exam.        Study Result      EXAM:  XR CLAVICLE RT     INDICATION:  Right sternoclavicular clavicle pain without injury.     COMPARISON: None.     FINDINGS: Two views of the right clavicle demonstrate no fracture.  The  sternoclavicular joints are symmetric in height.     IMPRESSION  IMPRESSION: No acute abnormality.

## 2017-09-25 ENCOUNTER — PATIENT OUTREACH (OUTPATIENT)
Dept: OTHER | Age: 62
End: 2017-09-25

## 2017-09-25 NOTE — PROGRESS NOTES
Transition Of Care Note    Patient discharged from [facility] on  for L thumb pain, and R shoulder pain. 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 Verified  and zip code with patient as identifiers. Provided introduction to self, and explanation of the Nurse Care Manager role. Medication:   Performed medication reconciliation with patient, and patient verbalizes understanding of administration of home medications. There were no barriers to obtaining medications identified at this time. Support system:  patient    Discharge Instructions :  Reviewed discharge instructions with patient. Patient verbalizes understanding of discharge instructions and follow-up care. Red Flags: You cannot move your hand. ·Your hand pops, moves out of its normal position, and then returns to its normal position. ·You have signs of infection, such as:  ¨Increased pain, swelling, warmth, or redness. ¨Red streaks leading from the sore area. ¨Pus draining from a place on your hand. ¨A fever. ·Your hand feels numb or tingly. Advance Care Planning:   Patient was offered the opportunity to discuss advance care planning:  no     Does patient have an Advance Directive:  no   If no, did you provide information on Caring Connections?  no     PCP/Specialist follow up: Patient recommended to follow-up with [facility], [facility] provided hand specialists contact information. Reviewed red flags with patient, and patient verbalizes understanding. Patient given an opportunity to ask questions. No other clinical/social/functional needs noted.    The patient agrees to contact the PCP office for questions related to their healthcare. The patient expressed thanks, offered no additional questions and ended the call.

## 2017-10-17 ENCOUNTER — PATIENT OUTREACH (OUTPATIENT)
Dept: OTHER | Age: 62
End: 2017-10-17

## 2017-10-17 NOTE — PROGRESS NOTES
Follow-up call to pt, two pt identifiers verified. Pt reports doing well, no red flag symptoms, denied any concerns or other needs at this time, this CM to continue following until end of transitions of care episode.

## 2017-11-03 ENCOUNTER — DOCUMENTATION ONLY (OUTPATIENT)
Dept: OTHER | Age: 62
End: 2017-11-03

## 2017-11-03 NOTE — PROGRESS NOTES
Resolving current episode Transitions of care complete. No further ED/UC or hospital admissions within 30 days post discharge. Patient attended follow-up appointments as directed. No outreach from patient to [address] [place] [address].

## 2017-11-16 RX ORDER — DOXYCYCLINE HYCLATE 50 MG/1
50 CAPSULE ORAL DAILY
COMMUNITY
End: 2020-06-19

## 2017-11-16 RX ORDER — GLUCOSAMINE SULFATE 1500 MG
1000 POWDER IN PACKET (EA) ORAL DAILY
COMMUNITY
End: 2021-07-01 | Stop reason: SDUPTHER

## 2017-11-17 ENCOUNTER — ANESTHESIA (OUTPATIENT)
Dept: ENDOSCOPY | Age: 62
End: 2017-11-17
Payer: COMMERCIAL

## 2017-11-17 ENCOUNTER — HOSPITAL ENCOUNTER (OUTPATIENT)
Age: 62
Setting detail: OUTPATIENT SURGERY
Discharge: HOME | End: 2017-11-17
Attending: INTERNAL MEDICINE | Admitting: INTERNAL MEDICINE
Payer: COMMERCIAL

## 2017-11-17 ENCOUNTER — ANESTHESIA EVENT (OUTPATIENT)
Dept: ENDOSCOPY | Age: 62
End: 2017-11-17
Payer: COMMERCIAL

## 2017-11-17 VITALS
OXYGEN SATURATION: 100 % | WEIGHT: 130 LBS | SYSTOLIC BLOOD PRESSURE: 103 MMHG | DIASTOLIC BLOOD PRESSURE: 58 MMHG | BODY MASS INDEX: 26.26 KG/M2 | RESPIRATION RATE: 17 BRPM | HEART RATE: 89 BPM | TEMPERATURE: 98.2 F

## 2017-11-17 PROCEDURE — 74011250636 HC RX REV CODE- 250/636

## 2017-11-17 PROCEDURE — 77030027957 HC TBNG IRR ENDOGTR BUSS -B: Performed by: INTERNAL MEDICINE

## 2017-11-17 PROCEDURE — 74011250637 HC RX REV CODE- 250/637: Performed by: INTERNAL MEDICINE

## 2017-11-17 PROCEDURE — 74011000250 HC RX REV CODE- 250

## 2017-11-17 PROCEDURE — 74011250636 HC RX REV CODE- 250/636: Performed by: INTERNAL MEDICINE

## 2017-11-17 PROCEDURE — 76060000031 HC ANESTHESIA FIRST 0.5 HR: Performed by: INTERNAL MEDICINE

## 2017-11-17 PROCEDURE — 76040000019: Performed by: INTERNAL MEDICINE

## 2017-11-17 RX ORDER — EPINEPHRINE 0.1 MG/ML
1 INJECTION INTRACARDIAC; INTRAVENOUS
Status: DISCONTINUED | OUTPATIENT
Start: 2017-11-17 | End: 2017-11-17 | Stop reason: HOSPADM

## 2017-11-17 RX ORDER — PROPOFOL 10 MG/ML
INJECTION, EMULSION INTRAVENOUS AS NEEDED
Status: DISCONTINUED | OUTPATIENT
Start: 2017-11-17 | End: 2017-11-17 | Stop reason: HOSPADM

## 2017-11-17 RX ORDER — SODIUM CHLORIDE 0.9 % (FLUSH) 0.9 %
5-10 SYRINGE (ML) INJECTION EVERY 8 HOURS
Status: DISCONTINUED | OUTPATIENT
Start: 2017-11-17 | End: 2017-11-17 | Stop reason: HOSPADM

## 2017-11-17 RX ORDER — LIDOCAINE HYDROCHLORIDE 20 MG/ML
INJECTION, SOLUTION EPIDURAL; INFILTRATION; INTRACAUDAL; PERINEURAL AS NEEDED
Status: DISCONTINUED | OUTPATIENT
Start: 2017-11-17 | End: 2017-11-17 | Stop reason: HOSPADM

## 2017-11-17 RX ORDER — MIDAZOLAM HYDROCHLORIDE 1 MG/ML
.25-5 INJECTION, SOLUTION INTRAMUSCULAR; INTRAVENOUS
Status: DISCONTINUED | OUTPATIENT
Start: 2017-11-17 | End: 2017-11-17 | Stop reason: HOSPADM

## 2017-11-17 RX ORDER — SODIUM CHLORIDE 9 MG/ML
150 INJECTION, SOLUTION INTRAVENOUS CONTINUOUS
Status: DISCONTINUED | OUTPATIENT
Start: 2017-11-17 | End: 2017-11-17 | Stop reason: HOSPADM

## 2017-11-17 RX ORDER — ATROPINE SULFATE 0.1 MG/ML
0.5 INJECTION INTRAVENOUS
Status: DISCONTINUED | OUTPATIENT
Start: 2017-11-17 | End: 2017-11-17 | Stop reason: HOSPADM

## 2017-11-17 RX ORDER — DEXTROMETHORPHAN/PSEUDOEPHED 2.5-7.5/.8
1.2 DROPS ORAL
Status: DISCONTINUED | OUTPATIENT
Start: 2017-11-17 | End: 2017-11-17 | Stop reason: HOSPADM

## 2017-11-17 RX ORDER — SODIUM CHLORIDE 0.9 % (FLUSH) 0.9 %
5-10 SYRINGE (ML) INJECTION AS NEEDED
Status: DISCONTINUED | OUTPATIENT
Start: 2017-11-17 | End: 2017-11-17 | Stop reason: HOSPADM

## 2017-11-17 RX ORDER — SODIUM CHLORIDE 9 MG/ML
INJECTION, SOLUTION INTRAVENOUS
Status: DISCONTINUED | OUTPATIENT
Start: 2017-11-17 | End: 2017-11-17 | Stop reason: HOSPADM

## 2017-11-17 RX ADMIN — SODIUM CHLORIDE: 9 INJECTION, SOLUTION INTRAVENOUS at 07:53

## 2017-11-17 RX ADMIN — PROPOFOL 30 MG: 10 INJECTION, EMULSION INTRAVENOUS at 08:12

## 2017-11-17 RX ADMIN — PROPOFOL 30 MG: 10 INJECTION, EMULSION INTRAVENOUS at 08:06

## 2017-11-17 RX ADMIN — PROPOFOL 50 MG: 10 INJECTION, EMULSION INTRAVENOUS at 08:00

## 2017-11-17 RX ADMIN — PROPOFOL 30 MG: 10 INJECTION, EMULSION INTRAVENOUS at 08:09

## 2017-11-17 RX ADMIN — PROPOFOL 30 MG: 10 INJECTION, EMULSION INTRAVENOUS at 08:03

## 2017-11-17 RX ADMIN — PROPOFOL 80 MG: 10 INJECTION, EMULSION INTRAVENOUS at 07:57

## 2017-11-17 RX ADMIN — LIDOCAINE HYDROCHLORIDE 50 MG: 20 INJECTION, SOLUTION EPIDURAL; INFILTRATION; INTRACAUDAL; PERINEURAL at 07:57

## 2017-11-17 NOTE — PROCEDURES
Rufino Nieves [place] [address] MICHAEL Torres [facility] [address] [facility]  [phone]               Colonoscopy Procedure Note    NAME: Martha Weaver  :  1955  MRN:  [id number]    Indications:   Family history of coloretal cancer (screening only)     : Lavinia Stevenson MD    Referring Provider:  Cj Feliciano MD    Medicines:  MAC anesthesia      Procedure Details:  After informed consent was obtained with all risks and benefits of the procedure explained and preprocedure exam completed, the patient was placed in the left lateral decubitus position. Universal protocol for patient identification was performed and documented in the nursing notes. Throughout the procedure, the patient's blood pressure was monitored at least every five minutes; pulse, and oxygen saturations were monitored continuously. All vital signs were documented in the nursing notes. A digital rectal exam was performed and was normal.  The Olympus videocolonoscope  was inserted in the rectum and carefully advanced to the cecum, which was identified by the ileocecal valve and appendiceal orifice. The colonoscope was slowly withdrawn with careful evaluation between folds. Retroflexion in the rectum was performed; findings and interventions are described below. Procedure start time, extent reached time/cecum time, and procedure end time are documented in the nursing notes. The quality of preparation was good. Findings:   Rectum: normal  Sigmoid:     - Diverticulosis  Descending Colon:     - Diverticulosis  Transverse Colon: normal  Ascending Colon: normal  Cecum: normal    Interventions:    none    Specimens:   * No specimens in log *    EBL:  None. Complications:   No immediate complications     Impression:  -Moderate colonic diverticulosis involving the L colon. Recommendations:   -Repeat colonoscopy in 5 years.    If < 10 years, reason:  above average risk patient    Resume normal medication(s). Signed by:  Willia Hamman, MD          11/17/2017  8:20 AM

## 2017-11-17 NOTE — ANESTHESIA POSTPROCEDURE EVALUATION
Post-Anesthesia Evaluation and Assessment    Patient: Guzman Ojeda MRN: [id number]  SSN: [id number]    YOB: 1955  Age: 65 y.o. Sex: female       Cardiovascular Function/Vital Signs  Visit Vitals    /58    Pulse 89    Temp 36.8 °C (98.2 °F)    Resp 17    Wt 59 kg (130 lb)    SpO2 100%    BMI 26.26 kg/m2       Patient is status post MAC anesthesia for Procedure(s):  COLONOSCOPY. Nausea/Vomiting: None    Postoperative hydration reviewed and adequate. Pain:  Pain Scale 1: Numeric (0 - 10) (11/17/17 0839)  Pain Intensity 1: 0 (11/17/17 0839)   Managed    Neurological Status: At baseline    Mental Status and Level of Consciousness: Arousable    Pulmonary Status:   O2 Device: Room air (11/17/17 0839)   Adequate oxygenation and airway patent    Complications related to anesthesia: None    Post-anesthesia assessment completed.  No concerns    Signed By: Vero Valdez MD     November 17, 2017

## 2017-11-17 NOTE — ROUTINE PROCESS
Gumzan Ojeda  1955  [id number]    Situation:  Verbal report received from: Marsha  Procedure: Procedure(s):  COLONOSCOPY    Background:    Preoperative diagnosis: FAMILY HISTORY OF COLON CANCER  Postoperative diagnosis: Diverticulosis    :  Dr. Juanjo Bazan  Assistant(s): Endoscopy Technician-1: Perez Parrish  Endoscopy RN-1: J Luis Blanca RN    Specimens: * No specimens in log *  H. Pylori  no    Assessment:  Intra-procedure medications   Anesthesia gave intra-procedure sedation and medications, see anesthesia flow sheet yes    Intravenous fluids: NS@ KVO     Vital signs stable yes    Abdominal assessment: round and soft yes     Recommendation:  Discharge patient per MD order yes .   Return to floor na   Family or Friend fam  Permission to share finding with family or friend yes

## 2017-11-17 NOTE — DISCHARGE INSTRUCTIONS
[facility] 912 Antwon Sow M.D.  [address] [facility] A[place]rea [place]   [phone]          COLONOSCOPY Uus 6  [id number]  1955    DISCOMFORT:  Redness at IV site- apply warm compress to area; if redness or soreness persist- contact your physician  There may be a slight amount of blood passed from the rectum  Gaseous discomfort- walking, belching will help relieve any discomfort  You may not operate a vehicle for 12 hours  You may not engage in an occupation involving machinery or appliances for the  rest of today  You may not drink alcoholic beverages for at least 12 hours  Avoid making any critical decisions for at least 24 hours    DIET:   You may resume your normal diet, but some patients find that heavy or large  meals may lead to indigestion or vomiting. I suggest a light meal as first food  intake. ACTIVITY:  You may resume your normal daily activities. It is recommended that you spend the remainder of the day resting - avoid any strenuous activity. CALL MICHAEL Brunson ANY SIGN OF:   Increasing pain, nausea, vomiting  Abdominal distension (swelling)  Significant bleeding (oral or rectal)  Fever   Pain in chest area  Shortness of breath    Additional Instructions:   Call Dr. Antwon Sow if any questions or problems at [phone]   Should have a repeat colonoscopy in 5 years. Colon with L sided diverticulosis. Diverticulosis: Care Instructions  Your Care Instructions  In diverticulosis, pouches called diverticula form in the wall of the large intestine (colon). The pouches do not cause any pain or other symptoms. Most people who have diverticulosis do not know they have it. But the pouches sometimes bleed, and if they become infected, they can cause pain and other symptoms. When this happens, it is called diverticulitis. Diverticula form when pressure pushes the wall of the colon outward at certain weak points.  A diet that is too low in fiber can cause diverticula. Follow-up care is a key part of your treatment and safety. Be sure to make and go to all appointments, and call your doctor if you are having problems. It's also a good idea to know your test results and keep a list of the medicines you take. How can you care for yourself at home? · Include fruits, leafy green vegetables, beans, and whole grains in your diet each day. These foods are high in fiber. · Take a fiber supplement, such as Citrucel or Metamucil, every day if needed. Read and follow all instructions on the label. · Drink plenty of fluids, enough so that your urine is light yellow or clear like water. If you have kidney, heart, or liver disease and have to limit fluids, talk with your doctor before you increase the amount of fluids you drink. · Get at least 30 minutes of exercise on most days of the week. Walking is a good choice. You also may want to do other activities, such as running, swimming, cycling, or playing tennis or team sports. · Cut out foods that cause gas, pain, or other symptoms. When should you call for help? Call your doctor now or seek immediate medical care if:  ? · You have belly pain. ? · You pass maroon or very bloody stools. ? · You have a fever. ? · You have nausea and vomiting. ? · You have unusual changes in your bowel movements or abdominal swelling. ? · You have burning pain when you urinate. ? · You have abnormal vaginal discharge. ? · You have shoulder pain. ? · You have cramping pain that does not get better when you have a bowel movement or pass gas. ? · You pass gas or stool from your urethra while urinating. ? Watch closely for changes in your health, and be sure to contact your doctor if you have any problems. Where can you learn more? Go to http://nuha-kem.info/. Enter Z547 in the search box to learn more about \"Diverticulosis: Care Instructions. \"  Current as of:  May 12, 2017  Content Version: 11.4  © 5286-9828 Healthwise, [employer]. Care instructions adapted under license by [employer] (which disclaims liability or warranty for this information). If you have questions about a medical condition or this instruction, always ask your healthcare professional. [address] any warranty or liability for your use of this information.

## 2017-11-17 NOTE — IP AVS SNAPSHOT
[address] 
[phone] Patient: Pancho Henderson MRN: [id number] About your hospitalization You were admitted on:  November 17, 2017 You last received care in the:  [facility] ENDOSCOPY You were discharged on:  November 17, 2017 Why you were hospitalized Your primary diagnosis was:  Not on File Discharge Orders None A check donovan indicates which time of day the medication should be taken. My Medications TAKE these medications as instructed Instructions Each Dose to Equal  
 Morning Noon Evening Bedtime  
 albuterol 90 mcg/actuation inhaler Commonly known as:  PROVENTIL HFA, VENTOLIN HFA, PROAIR HFA Your last dose was: Your next dose is: Take 2 puffs by inhalation every six (6) hours as needed for Wheezing. 2 Puff ASPIRIN PO Your last dose was: Your next dose is: Take 81 mg by mouth daily. 81 mg  
    
   
   
   
  
 atorvastatin 40 mg tablet Commonly known as:  LIPITOR Your last dose was: Your next dose is: Take 40 mg by mouth daily. 40 mg  
    
   
   
   
  
 doxycycline 50 mg capsule Commonly known as:  VIBRAMYCIN Your last dose was: Your next dose is: Take 50 mg by mouth daily. 50 mg  
    
   
   
   
  
 hydroCHLOROthiazide 12.5 mg tablet Commonly known as:  HYDRODIURIL Your last dose was: Your next dose is: Take 1 tablet by mouth daily. Needs to schedule ov before further refills. Indications: HYPERTENSION  
 12.5 mg  
    
   
   
   
  
 irbesartan 300 mg tablet Commonly known as:  AVAPRO Your last dose was: Your next dose is: Take 1 Tab by mouth daily. 300 mg  
    
   
   
   
  
 montelukast 10 mg tablet Commonly known as:  SINGULAIR  
   
 Your last dose was: Your next dose is: Take 10 mg by mouth daily. 10 mg PROBIOTIC PO Your last dose was: Your next dose is: Take 1 Cap by mouth daily. 1 Cap VITAMIN D3 1,000 unit Cap Generic drug:  cholecalciferol Your last dose was: Your next dose is: Take 1,000 Units by mouth daily. 1000 Units Discharge Instructions [address] Latrell Najera. Trevor Cisneros M.D. 
[address] 
[phone] COLONOSCOPY DISCHARGE INSTRUCTIONS Nidia Hernandez 
[id number] 
1955 DISCOMFORT: 
Redness at IV site- apply warm compress to area; if redness or soreness persist- contact your physician There may be a slight amount of blood passed from the rectum Gaseous discomfort- walking, belching will help relieve any discomfort You may not operate a vehicle for 12 hours You may not engage in an occupation involving machinery or appliances for the  rest of today You may not drink alcoholic beverages for at least 12 hours Avoid making any critical decisions for at least 24 hours DIET: 
 You may resume your normal diet, but some patients find that heavy or large  meals may lead to indigestion or vomiting. I suggest a light meal as first food  intake. ACTIVITY: 
You may resume your normal daily activities. It is recommended that you spend the remainder of the day resting - avoid any strenuous activity. CALL MICHAEL Loyola ANY SIGN OF: Increasing pain, nausea, vomiting Abdominal distension (swelling) Significant bleeding (oral or rectal) Fever Pain in chest area Shortness of breath Additional Instructions: 
 Call Dr. Trevor Cisneros if any questions or problems at [phone] Should have a repeat colonoscopy in 5 years. Colon with L sided diverticulosis. Diverticulosis: Care Instructions Your Care Instructions In diverticulosis, pouches called diverticula form in the wall of the large intestine (colon). The pouches do not cause any pain or other symptoms. Most people who have diverticulosis do not know they have it. But the pouches sometimes bleed, and if they become infected, they can cause pain and other symptoms. When this happens, it is called diverticulitis. Diverticula form when pressure pushes the wall of the colon outward at certain weak points. A diet that is too low in fiber can cause diverticula. Follow-up care is a key part of your treatment and safety. Be sure to make and go to all appointments, and call your doctor if you are having problems. It's also a good idea to know your test results and keep a list of the medicines you take. How can you care for yourself at home? · Include fruits, leafy green vegetables, beans, and whole grains in your diet each day. These foods are high in fiber. · Take a fiber supplement, such as Citrucel or Metamucil, every day if needed. Read and follow all instructions on the label. · Drink plenty of fluids, enough so that your urine is light yellow or clear like water. If you have kidney, heart, or liver disease and have to limit fluids, talk with your doctor before you increase the amount of fluids you drink. · Get at least 30 minutes of exercise on most days of the week. Walking is a good choice. You also may want to do other activities, such as running, swimming, cycling, or playing tennis or team sports. · Cut out foods that cause gas, pain, or other symptoms. When should you call for help? Call your doctor now or seek immediate medical care if: 
? · You have belly pain. ? · You pass maroon or very bloody stools. ? · You have a fever. ? · You have nausea and vomiting. ? · You have unusual changes in your bowel movements or abdominal swelling. ? · You have burning pain when you urinate. ? · You have abnormal vaginal discharge. ? · You have shoulder pain. ? · You have cramping pain that does not get better when you have a bowel movement or pass gas. ? · You pass gas or stool from your urethra while urinating. ? Watch closely for changes in your health, and be sure to contact your doctor if you have any problems. Where can you learn more? Go to http://nuha-kem.info/. Enter N722 in the search box to learn more about \"Diverticulosis: Care Instructions. \" Current as of: May 12, 2017 Content Version: 11.4 © 0382-6043 [employer]. Care instructions adapted under license by [employer] (which disclaims liability or warranty for this information). If you have questions about a medical condition or this instruction, always ask your healthcare professional. [address] any warranty or liability for your use of this information. Introducing [facility]! Dear Isaisa Bradley: Thank you for requesting a [employer] account. Our records indicate that you already have an active [employer] account. You can access your account anytime at [email or web address] Did you know that you can access your hospital and ER discharge instructions at any time in [employer]? You can also review all of your test results from your hospital stay or ER visit. Additional Information If you have questions, please visit the Frequently Asked Questions section of the [employer] website at [email or web address] Remember, [employer] is NOT to be used for urgent needs. For medical emergencies, dial 911. Now available from your iPhone and Android! Providers Seen During Your Hospitalization Provider Specialty Primary office phone Cindy Sinha MD Gastroenterology [phone] Your Primary Care Physician (PCP) Primary Care Physician Office Phone Office Fax Angelique Palacio [phone] You are allergic to the following No active allergies Recent Documentation Weight BMI OB Status Smoking Status 59 kg 26.26 kg/m2 Postmenopausal Current Every Day Smoker Emergency Contacts Name Discharge Info Relation Home Work Mobile David March CAREGIVER [3] Son [22]   [phone] Patient Belongings The following personal items are in your possession at time of discharge: 
                             
 
  
  
 Please provide this summary of care documentation to your next provider. Signatures-by signing, you are acknowledging that this After Visit Summary has been reviewed with you and you have received a copy. Patient Signature:  ____________________________________________________________ Date:  ____________________________________________________________  
  
[facility] Provider Signature:  ____________________________________________________________ Date:  ____________________________________________________________

## 2017-11-17 NOTE — PERIOP NOTES
.Patient has been evaluated by anesthesia pre-procedure. Patient alert and oriented. Vital signs will not be charted by the Endoscopy nurse. All vitals, airway, and loc are monitored by anesthesia staff throughout procedure. .Endoscope was pre-cleaned at bedside immediately following procedure by RONNIE Beckham.

## 2017-11-17 NOTE — H&P
[address], [address] [place] [address]          Pre-procedure History and Physical       NAME:  Tre Mireles   :   1955   MRN:   [id number]     CHIEF COMPLAINT/HPI: See procedure note    PMH:  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    Hypercholesterolemia 2001    Hypertension     Non-seasonal allergic rhinitis 6-    dr Marsha Monroe Osteoporosis 2006       [address] [place]:  Past Surgical History:   Procedure Laterality Date    ENDOSCOPY, COLON, DIAGNOSTIC  2007    due 2017    HX GYN  1974    OVARIAN CYST REMOVED    HX ORTHOPAEDIC      R hip replacement    NATALIE BIOPSY BREAST STEREOTACTIC Right 2010    benign       Allergies:  No Known Allergies    Home Medications:  Prior to Admission Medications   Prescriptions Last Dose Informant Patient Reported? Taking? ASPIRIN PO 2017 at Unknown time  Yes Yes   Sig: Take 81 mg by mouth daily. Hydrochlorothiazide (HYDRODIURIL) 12.5 mg tablet 2017 at Unknown time  No Yes   Sig: Take 1 tablet by mouth daily. Needs to schedule ov before further refills. Indications: HYPERTENSION   LACTOBACILLUS ACIDOPHILUS (PROBIOTIC PO) 2017 at Unknown time  Yes Yes   Sig: Take 1 Cap by mouth daily. albuterol (PROVENTIL HFA, VENTOLIN HFA, PROAIR HFA) 90 mcg/actuation inhaler Unknown at Unknown time  No No   Sig: Take 2 puffs by inhalation every six (6) hours as needed for Wheezing. atorvastatin (LIPITOR) 40 mg tablet 2017 at Unknown time  Yes Yes   Sig: Take 40 mg by mouth daily. cholecalciferol (VITAMIN D3) 1,000 unit cap 2017 at Unknown time  Yes Yes   Sig: Take 1,000 Units by mouth daily. doxycycline (VIBRAMYCIN) 50 mg capsule 2017 at Unknown time  Yes Yes   Sig: Take 50 mg by mouth daily. irbesartan (AVAPRO) 300 mg tablet 2017 at Unknown time  No Yes   Sig: Take 1 Tab by mouth daily. montelukast (SINGULAIR) 10 mg tablet 11/16/2017 at Unknown time  Yes Yes   Sig: Take 10 mg by mouth daily. Facility-Administered Medications: None       Hospital Medications:  Current Facility-Administered Medications   Medication Dose Route Frequency    0.9% sodium chloride infusion  150 mL/hr IntraVENous CONTINUOUS    sodium chloride (NS) flush 5-10 mL  5-10 mL IntraVENous Q8H    sodium chloride (NS) flush 5-10 mL  5-10 mL IntraVENous PRN    midazolam (VERSED) injection 0.25-5 mg  0.25-5 mg IntraVENous Multiple    simethicone (MYLICON) 98DU/1.8UP oral drops 80 mg  1.2 mL Oral Multiple    atropine injection 0.5 mg  0.5 mg IntraVENous ONCE PRN    EPINEPHrine (ADRENALIN) 0.1 mg/mL syringe 1 mg  1 mg Endoscopically ONCE PRN     Facility-Administered Medications Ordered in Other Encounters   Medication Dose Route Frequency    0.9% sodium chloride infusion   IntraVENous CONTINUOUS       Family History:  Family History   Problem Relation Age of Onset    Breast Cancer Maternal Aunt     Dementia Mother     Hypertension Mother     High Cholesterol Mother     Diabetes Mother     Cancer Father      COLON    Hypertension Sister     Diabetes Sister     Hypertension Brother     Hypertension Sister     High Cholesterol Sister     Hypertension Brother     Anesth Problems Neg Hx        Social History:  Social History   Substance Use Topics    Smoking status: Current Every Day Smoker     Packs/day: 0.30     Years: 37.00     Types: Cigarettes    Smokeless tobacco: Never Used    Alcohol use No         PHYSICAL EXAM PRIOR TO SEDATION:  General: Alert, in no acute distress    Lungs:            CTA bilaterally  Heart:  Normal S1, S2    Abdomen: Soft, Non distended, Non tender. Normoactive bowel sounds. Assessment:   Stable for sedation administration.   Date of last colonoscopy: 10 yrs, Polyps  No    Plan:   · Endoscopic procedure with sedation     Signed By: Reji Jimenez MD     11/17/2017  7:56 AM

## 2017-11-17 NOTE — ANESTHESIA PREPROCEDURE EVALUATION
Anesthetic History               Review of Systems / Medical History  Patient summary reviewed, nursing notes reviewed and pertinent labs reviewed    Pulmonary            Asthma        Neuro/Psych              Cardiovascular    Hypertension                   GI/Hepatic/Renal               Comments: screening Endo/Other        Arthritis     Other Findings            Physical Exam    Airway  Mallampati: II  TM Distance: > 6 cm  Neck ROM: normal range of motion   Mouth opening: Normal     Cardiovascular    Rhythm: regular  Rate: normal         Dental    Dentition: Upper partial plate     Pulmonary  Breath sounds clear to auscultation               Abdominal         Other Findings            Anesthetic Plan    ASA: 2  Anesthesia type: MAC          Induction: Intravenous  Anesthetic plan and risks discussed with: Patient

## 2017-12-08 ENCOUNTER — HOSPITAL ENCOUNTER (OUTPATIENT)
Dept: MAMMOGRAPHY | Age: 62
Discharge: HOME | End: 2017-12-08
Attending: FAMILY MEDICINE
Payer: COMMERCIAL

## 2017-12-08 ENCOUNTER — HOSPITAL ENCOUNTER (EMERGENCY)
Age: 62
Discharge: HOME | End: 2017-12-08
Attending: FAMILY MEDICINE

## 2017-12-08 VITALS
HEART RATE: 92 BPM | WEIGHT: 131 LBS | OXYGEN SATURATION: 99 % | BODY MASS INDEX: 26.41 KG/M2 | HEIGHT: 59 IN | SYSTOLIC BLOOD PRESSURE: 158 MMHG | TEMPERATURE: 98.7 F | DIASTOLIC BLOOD PRESSURE: 78 MMHG | RESPIRATION RATE: 18 BRPM

## 2017-12-08 DIAGNOSIS — Z12.31 VISIT FOR SCREENING MAMMOGRAM: ICD-10-CM

## 2017-12-08 DIAGNOSIS — H66.002 ACUTE SUPPURATIVE OTITIS MEDIA OF LEFT EAR WITHOUT SPONTANEOUS RUPTURE OF TYMPANIC MEMBRANE, RECURRENCE NOT SPECIFIED: ICD-10-CM

## 2017-12-08 DIAGNOSIS — J20.9 ACUTE BRONCHITIS, UNSPECIFIED ORGANISM: Primary | ICD-10-CM

## 2017-12-08 PROCEDURE — 77063 BREAST TOMOSYNTHESIS BI: CPT

## 2017-12-08 RX ORDER — AZITHROMYCIN 250 MG/1
TABLET, FILM COATED ORAL
Qty: 6 TAB | Refills: 0 | Status: SHIPPED | OUTPATIENT
Start: 2017-12-08 | End: 2018-04-09

## 2017-12-08 RX ORDER — PREDNISONE 5 MG/1
TABLET ORAL
Qty: 21 TAB | Refills: 0 | Status: SHIPPED | OUTPATIENT
Start: 2017-12-08 | End: 2018-04-09

## 2017-12-08 NOTE — DISCHARGE INSTRUCTIONS
Bronchitis: Care Instructions  Your Care Instructions    Bronchitis is inflammation of the bronchial tubes, which carry air to the lungs. The tubes swell and produce mucus, or phlegm. The mucus and inflamed bronchial tubes make you cough. You may have trouble breathing. Most cases of bronchitis are caused by viruses like those that cause colds. Antibiotics usually do not help and they may be harmful. Bronchitis usually develops rapidly and lasts about 2 to 3 weeks in otherwise healthy people. Follow-up care is a key part of your treatment and safety. Be sure to make and go to all appointments, and call your doctor if you are having problems. It's also a good idea to know your test results and keep a list of the medicines you take. How can you care for yourself at home? · Take all medicines exactly as prescribed. Call your doctor if you think you are having a problem with your medicine. · Get some extra rest.  · Take an over-the-counter pain medicine, such as acetaminophen (Tylenol), ibuprofen (Advil, Motrin), or naproxen (Aleve) to reduce fever and relieve body aches. Read and follow all instructions on the label. · Do not take two or more pain medicines at the same time unless the doctor told you to. Many pain medicines have acetaminophen, which is Tylenol. Too much acetaminophen (Tylenol) can be harmful. · Take an over-the-counter cough medicine that contains dextromethorphan to help quiet a dry, hacking cough so that you can sleep. Avoid cough medicines that have more than one active ingredient. Read and follow all instructions on the label. · Breathe moist air from a humidifier, hot shower, or sink filled with hot water. The heat and moisture will thin mucus so you can cough it out. · Do not smoke. Smoking can make bronchitis worse. If you need help quitting, talk to your doctor about stop-smoking programs and medicines. These can increase your chances of quitting for good.   When should you call for help? Call 911 anytime you think you may need emergency care. For example, call if:  ? · You have severe trouble breathing. ?Call your doctor now or seek immediate medical care if:  ? · You have new or worse trouble breathing. ? · You cough up dark brown or bloody mucus (sputum). ? · You have a new or higher fever. ? · You have a new rash. ? Watch closely for changes in your health, and be sure to contact your doctor if:  ? · You cough more deeply or more often, especially if you notice more mucus or a change in the color of your mucus. ? · You are not getting better as expected. Where can you learn more? Go to http://nuha-kem.info/. Enter H333 in the search box to learn more about \"Bronchitis: Care Instructions. \"  Current as of: May 12, 2017  Content Version: 11.4  © 0142-3471 [employer]. Care instructions adapted under license by [employer] (which disclaims liability or warranty for this information). If you have questions about a medical condition or this instruction, always ask your healthcare professional. [place] [address] any warranty or liability for your use of this information. Ear Infection (Otitis Media): Care Instructions  Your Care Instructions    An ear infection may start with a cold and affect the middle ear (otitis media). It can hurt a lot. Most ear infections clear up on their own in a couple of days. Most often you will not need antibiotics. This is because many ear infections are caused by a virus. Antibiotics don't work against a virus. Regular doses of pain medicines are the best way to reduce your fever and help you feel better. Follow-up care is a key part of your treatment and safety. Be sure to make and go to all appointments, and call your doctor if you are having problems. It's also a good idea to know your test results and keep a list of the medicines you take.   How can you care for yourself at home? · Take pain medicines exactly as directed. ¨ If the doctor gave you a prescription medicine for pain, take it as prescribed. ¨ If you are not taking a prescription pain medicine, take an over-the-counter medicine, such as acetaminophen (Tylenol), ibuprofen (Advil, Motrin), or naproxen (Aleve). Read and follow all instructions on the label. ¨ Do not take two or more pain medicines at the same time unless the doctor told you to. Many pain medicines have acetaminophen, which is Tylenol. Too much acetaminophen (Tylenol) can be harmful. · Plan to take a full dose of pain reliever before bedtime. Getting enough sleep will help you get better. · Try a warm, moist washcloth on the ear. It may help relieve pain. · If your doctor prescribed antibiotics, take them as directed. Do not stop taking them just because you feel better. You need to take the full course of antibiotics. When should you call for help? Call your doctor now or seek immediate medical care if:  ? · You have new or increasing ear pain. ? · You have new or increasing pus or blood draining from your ear. ? · You have a fever with a stiff neck or a severe headache. ? Watch closely for changes in your health, and be sure to contact your doctor if:  ? · You have new or worse symptoms. ? · You are not getting better after taking an antibiotic for 2 days. Where can you learn more? Go to http://nuha-kem.info/. Enter P337 in the search box to learn more about \"Ear Infection (Otitis Media): Care Instructions. \"  Current as of: May 12, 2017  Content Version: 11.4  © 1935-0053 [employer]. Care instructions adapted under license by [employer] (which disclaims liability or warranty for this information).  If you have questions about a medical condition or this instruction, always ask your healthcare professional. [address] any warranty or liability for your use of this information.

## 2017-12-08 NOTE — UC PROVIDER NOTE
Patient is a 65 y.o. female presenting with cough. The history is provided by the patient. Cough   This is a new problem. Episode onset: 4 days ago. The problem occurs every few minutes. The problem has been gradually worsening. The cough is productive of sputum. There has been no fever. Associated symptoms include rhinorrhea, sore throat and wheezing. Pertinent negatives include no chest pain, no chills, no sweats, no ear congestion, no ear pain, no headaches, no myalgias, no shortness of breath, no nausea and no vomiting. She has tried inhalers and decongestants for the symptoms. The treatment provided no relief. She is a smoker. 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 [address], COLON, DIAGNOSTIC  2007    due 2017    HX GYN  1974    OVARIAN CYST REMOVED    HX ORTHOPAEDIC      R hip replacement    NATALIE BIOPSY BREAST STEREOTACTIC Right 2010    benign         Family History   Problem Relation Age of Onset    Breast Cancer Maternal Aunt     Dementia Mother     Hypertension Mother     High Cholesterol Mother     Diabetes Mother     Cancer Father      COLON    Hypertension Sister     Diabetes Sister     Hypertension Brother     Hypertension Sister     High Cholesterol Sister     Hypertension Brother     Anesth Problems Neg Hx         Social History     Social History    Marital status:      Spouse name: N/A    Number of children: N/A    Years of education: N/A     Occupational History    Not on file.      Social History Main Topics    Smoking status: Current Every Day Smoker     Packs/day: 0.30 Years: 37.00     Types: Cigarettes    Smokeless tobacco: Never Used    Alcohol use No    Drug use: No    Sexual activity: Yes     Partners: Male     Other Topics Concern    Not on file     Social History Narrative                ALLERGIES: Review of patient's allergies indicates no known allergies. Review of Systems   Constitutional: Negative for chills and fever. HENT: Positive for congestion, rhinorrhea and sore throat. Negative for ear pain. Respiratory: Positive for cough and wheezing. Negative for shortness of breath. Cardiovascular: Negative for chest pain and palpitations. Gastrointestinal: Negative for nausea and vomiting. Musculoskeletal: Negative for myalgias. Skin: Negative for rash. Neurological: Negative for dizziness and headaches. Vitals:    12/08/17 1145   BP: 158/78   Pulse: 92   Resp: 18   Temp: 98.7 °F (37.1 °C)   SpO2: 99%   Weight: 59.4 kg (131 lb)   Height: 4' 11\" (1.499 m)       Physical Exam   Constitutional: She appears well-developed and well-nourished. No distress. HENT:   Right Ear: Tympanic membrane, external ear and ear canal normal.   Left Ear: Tympanic membrane, external ear and ear canal normal.   Nose: Rhinorrhea present. Right sinus exhibits no maxillary sinus tenderness and no frontal sinus tenderness. Left sinus exhibits no maxillary sinus tenderness and no frontal sinus tenderness. Mouth/Throat: Mucous membranes are normal. Posterior oropharyngeal erythema present. No oropharyngeal exudate, posterior oropharyngeal edema or tonsillar abscesses. Cardiovascular: Normal rate, regular rhythm and normal heart sounds. Pulmonary/Chest: Effort normal. No respiratory distress. She has wheezes. She has no rales. Lymphadenopathy:     She has cervical adenopathy. Neurological: She is alert. Skin: She is not diaphoretic. Psychiatric: She has a normal mood and affect.  Her behavior is normal. Judgment and thought content normal.   Nursing note and vitals reviewed. MDM     Differential Diagnosis; Clinical Impression; Plan:     CLINICAL IMPRESSION:  Acute bronchitis, unspecified organism  (primary encounter diagnosis)  Acute suppurative otitis media of left ear without spontaneous rupture of tympanic membrane, recurrence not specified    Plan:  1. Zpak  2. Pred micaela  3. Albuterol prn  Risk of Significant Complications, Morbidity, and/or Mortality:   Presenting problems: Moderate  Management options:   Moderate  Progress:   Patient progress:  Stable      Procedures

## 2017-12-11 ENCOUNTER — PATIENT OUTREACH (OUTPATIENT)
Dept: OTHER | Age: 62
End: 2017-12-11

## 2017-12-11 NOTE — PROGRESS NOTES
Patient on [facility] discharge report dated 12/11. Left message on voicemail. Will attempt to contact again. Need to complete post-discharge assessment.

## 2017-12-12 ENCOUNTER — PATIENT OUTREACH (OUTPATIENT)
Dept: OTHER | Age: 62
End: 2017-12-12

## 2017-12-12 NOTE — PROGRESS NOTES
Transition Of Care Note    Patient discharged from [facility] on  for acute bronchitis and left ear otitis media. 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 Verified  and zip code with patient as identifiers. Provided introduction to self, and explanation of the Nurse Care Manager role. Medication:   Performed medication reconciliation with patient, and patient verbalizes understanding of administration of home medications. There were no barriers to obtaining medications identified at this time. Support system:  patient    Discharge Instructions :  Reviewed discharge instructions with patient. Patient verbalizes understanding of discharge instructions and follow-up care. Red Flags: You have new or worse trouble breathing. You cough up dark brown or bloody mucus (sputum). You have a new or higher fever. You have a new rash. You have new or increasing pus or blood draining from your ear. You have a fever with a stiff neck or a severe headache.    ?             Advance Care Planning:   Patient was offered the opportunity to discuss advance care planning:  no     Does patient have an Advance Directive:  no   If no, did you provide information on Caring Connections?  no     PCP/Specialist follow up: Patient denied need to follow up with Diego Robert MD at this time. Reviewed red flags with patient, and patient verbalizes understanding. Patient given an opportunity to ask questions. No other clinical/social/functional needs noted. The patient agrees to contact the PCP office for questions related to their healthcare.   The patient expressed thanks, offered no additional questions and ended the call.

## 2018-01-04 ENCOUNTER — PATIENT OUTREACH (OUTPATIENT)
Dept: OTHER | Age: 63
End: 2018-01-04

## 2018-01-04 NOTE — PROGRESS NOTES
Follow-up call to pt, two pt identifiers verified. Pt reports doing well, no concerns or questions at this time. Verbalized understanding to contact for any concerns.

## 2018-01-18 ENCOUNTER — DOCUMENTATION ONLY (OUTPATIENT)
Dept: OTHER | Age: 63
End: 2018-01-18

## 2018-01-18 NOTE — PROGRESS NOTES
Resolving current episode Transitions of care complete. No further ED/UC or hospital admissions within 30 days post discharge. Patient attended follow-up appointments as directed. No outreach from patient to [address] [place] [address].

## 2018-04-09 ENCOUNTER — OFFICE VISIT (OUTPATIENT)
Dept: URGENT CARE | Age: 63
End: 2018-04-09

## 2018-04-09 VITALS
HEIGHT: 59 IN | DIASTOLIC BLOOD PRESSURE: 74 MMHG | HEART RATE: 101 BPM | WEIGHT: 136 LBS | RESPIRATION RATE: 18 BRPM | OXYGEN SATURATION: 99 % | BODY MASS INDEX: 27.42 KG/M2 | TEMPERATURE: 96.8 F | SYSTOLIC BLOOD PRESSURE: 160 MMHG

## 2018-04-09 DIAGNOSIS — R10.13 DYSPEPSIA: ICD-10-CM

## 2018-04-09 DIAGNOSIS — R10.13 EPIGASTRIC PAIN: Primary | ICD-10-CM

## 2018-04-09 RX ORDER — OMEPRAZOLE 40 MG/1
40 CAPSULE, DELAYED RELEASE ORAL DAILY
Qty: 30 CAP | Refills: 0 | Status: SHIPPED | OUTPATIENT
Start: 2018-04-09 | End: 2020-06-19

## 2018-04-09 RX ORDER — DICYCLOMINE HYDROCHLORIDE 20 MG/1
20 TABLET ORAL EVERY 6 HOURS
Qty: 12 TAB | Refills: 0 | Status: SHIPPED | OUTPATIENT
Start: 2018-04-09 | End: 2020-06-19

## 2018-04-09 NOTE — PATIENT INSTRUCTIONS
Use OTC Mylanta as neededs     Indigestion (Dyspepsia or Heartburn): Care Instructions  Your Care Instructions  Sometimes it can be hard to pinpoint the cause of indigestion. (It is also called dyspepsia or heartburn.) Most cases of an upset stomach with bloating, burning, burping, and nausea are minor and go away within several hours. Home treatment and over-the-counter medicine often are able to control symptoms. But if you take medicine to relieve your indigestion without making diet and lifestyle changes, your symptoms are likely to return again and again. If you get indigestion often, it may be a sign of a more serious medical problem. Be sure to follow up with your doctor, who may want to do tests to be sure of the cause of your indigestion. Follow-up care is a key part of your treatment and safety. Be sure to make and go to all appointments, and call your doctor if you are having problems. It's also a good idea to know your test results and keep a list of the medicines you take. How can you care for yourself at home? · Your doctor may recommend over-the-counter medicine. For mild or occasional indigestion, antacids such as Gaviscon, Mylanta, Maalox, or Tums, may help. Be safe with medicines. Be careful when you take over-the-counter antacid medicines. Many of these medicines have aspirin in them. Read the label to make sure that you are not taking more than the recommended dose. Too much aspirin can be harmful. · Your doctor also may recommend over-the-counter acid reducers, such as Pepcid AC, Tagamet HB, Zantac 75, or Prilosec. Read and follow all instructions on the label. If you use these medicines often, talk with your doctor. · Change your eating habits. ¨ It's best to eat several small meals instead of two or three large meals. ¨ After you eat, wait 2 to 3 hours before you lie down. ¨ Chocolate, mint, and alcohol can make GERD worse.   ¨ Spicy foods, foods that have a lot of acid (like tomatoes and oranges), and coffee can make GERD symptoms worse in some people. If your symptoms are worse after you eat a certain food, you may want to stop eating that food to see if your symptoms get better. · Do not smoke or chew tobacco. Smoking can make GERD worse. If you need help quitting, talk to your doctor about stop-smoking programs and medicines. These can increase your chances of quitting for good. · If you have GERD symptoms at night, raise the head of your bed 6 to 8 inches. You can do this by putting the frame on blocks or placing a foam wedge under the head of your mattress. (Adding extra pillows does not work.)  · Do not wear tight clothing around your middle. · Lose weight if you need to. Losing just 5 to 10 pounds can help. · Do not take anti-inflammatory medicines, such as aspirin, ibuprofen (Advil, Motrin), or naproxen (Aleve). These can irritate the stomach. If you need a pain medicine, try acetaminophen (Tylenol), which does not cause stomach upset. When should you call for help? Call your doctor now or seek immediate medical care if:  ? · You have new or worse belly pain. ? · You are vomiting. ? Watch closely for changes in your health, and be sure to contact your doctor if:  ? · You have new or worse symptoms of indigestion. ? · You have trouble or pain swallowing. ? · You are losing weight. ? · You do not get better as expected. Where can you learn more? Go to http://nuha-kem.info/. Enter G558 in the search box to learn more about \"Indigestion (Dyspepsia or Heartburn): Care Instructions. \"  Current as of: May 12, 2017  Content Version: 11.4  © 2927-5869 "[employer]. Care instructions adapted under license by [employer] (which disclaims liability or warranty for this information).  If you have questions about a medical condition or this instruction, always ask your healthcare professional. Jorge Peguero any warranty or liability for your use of this information.

## 2018-04-09 NOTE — MR AVS SNAPSHOT
[address] [facility] [address] 
[phone] Patient: Arcelia Rebolledo MRN: [id number] Visit Information Date & Time Provider Department Dept. Phone Encounter #  
 4/9/2018  4:45 PM [facility] [phone] [id number] Follow-up Instructions Return if symptoms worsen or fail to improve, for Follow up with PCP. Upcoming Health Maintenance Date Due Hepatitis C Screening 1955 Pneumococcal 19-64 Medium Risk (1 of 1 - PPSV23) 10/3/1975 DTaP/Tdap/Td series (1 - Tdap) 10/3/1977 FOBT Q 1 YEAR AGE 50-75 10/3/2006 PAP AKA CERVICAL CYTOLOGY 7/26/2015 ZOSTER VACCINE AGE 60> 8/3/2016 Influenza Age 5 to Adult 8/1/2017 BREAST CANCER SCRN MAMMOGRAM 12/8/2019 Allergies as of 4/9/2018  Review Complete On: 4/9/2018 By: Janene Weinberg RN No Known Allergies Current Immunizations  Reviewed on 4/11/2013 Name Date Influenza Vaccine Split 10/3/2012 Not reviewed this visit You Were Diagnosed With   
  
 Codes Comments Epigastric pain    -  Primary ICD-10-CM: R10.13 ICD-9-CM: 789.06 Dyspepsia     ICD-10-CM: R10.13 ICD-9-CM: 536.8 Vitals BP Pulse Temp Resp Height(growth percentile) Weight(growth percentile) 160/74 (!) 101 96.8 °F (36 °C) 18 4' 11\" (1.499 m) 136 lb (61.7 kg) SpO2 BMI OB Status Smoking Status 99% 27.47 kg/m2 Postmenopausal Current Every Day Smoker BMI and BSA Data Body Mass Index Body Surface Area  
 27.47 kg/m 2 1.6 m 2 Preferred Pharmacy Pharmacy Name Phone [id number], [address] [place] [address] [phone] Your Updated Medication List  
  
   
This list is accurate as of 4/9/18  5:52 PM.  Always use your most recent med list.  
  
  
  
  
 albuterol 90 mcg/actuation inhaler Commonly known as:  PROVENTIL HFA, VENTOLIN HFA, PROAIR HFA Take 2 puffs by inhalation every six (6) hours as needed for Wheezing. ASPIRIN PO Take 81 mg by mouth daily. atorvastatin 40 mg tablet Commonly known as:  LIPITOR Take 40 mg by mouth daily. dicyclomine 20 mg tablet Commonly known as:  BENTYL Take 1 Tab by mouth every six (6) hours. doxycycline 50 mg capsule Commonly known as:  VIBRAMYCIN Take 50 mg by mouth daily. hydroCHLOROthiazide 12.5 mg tablet Commonly known as:  HYDRODIURIL Take 1 tablet by mouth daily. Needs to schedule ov before further refills. Indications: HYPERTENSION  
  
 irbesartan 300 mg tablet Commonly known as:  AVAPRO Take 1 Tab by mouth daily. montelukast 10 mg tablet Commonly known as:  SINGULAIR Take 10 mg by mouth daily. omeprazole 40 mg capsule Commonly known as:  PRILOSEC Take 1 Cap by mouth daily. PROBIOTIC PO Take 1 Cap by mouth daily. VITAMIN D3 1,000 unit Cap Generic drug:  cholecalciferol Take 1,000 Units by mouth daily. Prescriptions Sent to Pharmacy Refills  
 omeprazole (PRILOSEC) 40 mg capsule 0 Sig: Take 1 Cap by mouth daily. Class: Normal  
 Pharmacy: [facility]/pharmacy [address] [place] [address] Ph #: [phone] Route: Oral  
 dicyclomine (BENTYL) 20 mg tablet 0 Sig: Take 1 Tab by mouth every six (6) hours. Class: Normal  
 Pharmacy: [facility]/pharmacy [address] [place] [address] Ph #: [phone] Route: Oral  
  
We Performed the Following EKG, 12 LEAD, INITIAL [45445 CPT(R)] Follow-up Instructions Return if symptoms worsen or fail to improve, for Follow up with PCP. To-Do List   
 04/13/2018 3:00 PM  
  Appointment with [facility] SYED Grubbs at [address] [place] [address] ([phone]) Please, no calcium supplements or antacids that coat the stomach (ex: Tums, Mylanta) 24 hours prior to procedure. Maintain normal diet and medications. Dairy products are allowed. Wear an outfit with an elastic waistband (no zipper or metal snaps). Check in at registration 15min before your appointment time unless you were instructed to do otherwise. Patient Instructions Use OTC Mylanta as neededs Indigestion (Dyspepsia or Heartburn): Care Instructions Your Care Instructions Sometimes it can be hard to pinpoint the cause of indigestion. (It is also called dyspepsia or heartburn.) Most cases of an upset stomach with bloating, burning, burping, and nausea are minor and go away within several hours. Home treatment and over-the-counter medicine often are able to control symptoms. But if you take medicine to relieve your indigestion without making diet and lifestyle changes, your symptoms are likely to return again and again. If you get indigestion often, it may be a sign of a more serious medical problem. Be sure to follow up with your doctor, who may want to do tests to be sure of the cause of your indigestion. Follow-up care is a key part of your treatment and safety. Be sure to make and go to all appointments, and call your doctor if you are having problems. It's also a good idea to know your test results and keep a list of the medicines you take. How can you care for yourself at home? · Your doctor may recommend over-the-counter medicine. For mild or occasional indigestion, antacids such as Gaviscon, Mylanta, Maalox, or Tums, may help. Be safe with medicines. Be careful when you take over-the-counter antacid medicines. Many of these medicines have aspirin in them. Read the label to make sure that you are not taking more than the recommended dose. Too much aspirin can be harmful.  
· Your doctor also may recommend over-the-counter acid reducers, such as Pepcid AC, Tagamet HB, Zantac 75, or Prilosec. Read and follow all instructions on the label. If you use these medicines often, talk with your doctor. · Change your eating habits. ¨ It's best to eat several small meals instead of two or three large meals. ¨ After you eat, wait 2 to 3 hours before you lie down. ¨ Chocolate, mint, and alcohol can make GERD worse. ¨ Spicy foods, foods that have a lot of acid (like tomatoes and oranges), and coffee can make GERD symptoms worse in some people. If your symptoms are worse after you eat a certain food, you may want to stop eating that food to see if your symptoms get better. · Do not smoke or chew tobacco. Smoking can make GERD worse. If you need help quitting, talk to your doctor about stop-smoking programs and medicines. These can increase your chances of quitting for good. · If you have GERD symptoms at night, raise the head of your bed 6 to 8 inches. You can do this by putting the frame on blocks or placing a foam wedge under the head of your mattress. (Adding extra pillows does not work.) · Do not wear tight clothing around your middle. · Lose weight if you need to. Losing just 5 to 10 pounds can help. · Do not take anti-inflammatory medicines, such as aspirin, ibuprofen (Advil, Motrin), or naproxen (Aleve). These can irritate the stomach. If you need a pain medicine, try acetaminophen (Tylenol), which does not cause stomach upset. When should you call for help? Call your doctor now or seek immediate medical care if: 
? · You have new or worse belly pain. ? · You are vomiting. ? Watch closely for changes in your health, and be sure to contact your doctor if: 
? · You have new or worse symptoms of indigestion. ? · You have trouble or pain swallowing. ? · You are losing weight. ? · You do not get better as expected. Where can you learn more? Go to http://nuha-kem.info/. Enter X775 in the search box to learn more about \"Indigestion (Dyspepsia or Heartburn): Care Instructions. \" Current as of: May 12, 2017 Content Version: 11.4 © 5814-2284 [email or web address]. Care instructions adapted under license by [employer] (which disclaims liability or warranty for this information). If you have questions about a medical condition or this instruction, always ask your healthcare professional. [address] any warranty or liability for your use of this information. Introducing [facility] & HEALTH SERVICES! Dear Etelvina Roberto: Thank you for requesting a [employer] account. Our records indicate that you already have an active [employer] account. You can access your account anytime at [email or web address] Did you know that you can access your hospital and ER discharge instructions at any time in [employer]? You can also review all of your test results from your hospital stay or ER visit. Additional Information If you have questions, please visit the Frequently Asked Questions section of the [employer] website at [email or web address] Remember, [employer] is NOT to be used for urgent needs. For medical emergencies, dial 911. Now available from your iPhone and Android! Please provide this summary of care documentation to your next provider. Your primary care clinician is listed as Domo Bolden. If you have any questions after today's visit, please call [phone].

## 2018-04-11 NOTE — PROGRESS NOTES
Patient is a 65 y.o. female presenting with indigestion. The history is provided by the patient. Indigestion   This is a new problem. The current episode started more than 2 days ago. The problem occurs daily. The problem has been gradually worsening. Associated symptoms include abdominal pain (epigastric area- with fullness). The symptoms are aggravated by eating. Nothing relieves the symptoms. 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    Hypercholesterolemia 2001    Hypertension 2001    Non-seasonal allergic rhinitis 6-2011    dr Hilario Etiennesuresh    Osteoporosis 2006        Past Surgical History:   Procedure Laterality Date    COLONOSCOPY N/A 11/17/2017    COLONOSCOPY performed by Merle Alcantara MD at [facility]. [address], COLON, DIAGNOSTIC  2007    due 2017    HX GYN  1974    OVARIAN CYST REMOVED    HX ORTHOPAEDIC      R hip replacement    NATALIE BIOPSY BREAST STEREOTACTIC Right 2010    benign         Family History   Problem Relation Age of Onset    Breast Cancer Maternal Aunt     Dementia Mother     Hypertension Mother     High Cholesterol Mother     Diabetes Mother     Cancer Father      COLON    Hypertension Sister     Diabetes Sister     Hypertension Brother     Hypertension Sister     High Cholesterol Sister     Hypertension Brother     Anesth Problems Neg Hx         Social History     Social History    Marital status:      Spouse name: N/A    Number of children: N/A    Years of education: N/A     Occupational History    Not on file.      Social History Main Topics    Smoking status: Current Every Day Smoker     Packs/day: 0.30     Years: 37.00     Types: Cigarettes    Smokeless tobacco: Never Used    Alcohol use No    Drug use: No    Sexual activity: Yes     Partners: Male     Other Topics Concern    Not on file     Social History Narrative                ALLERGIES: Review of patient's allergies indicates no known allergies. Review of Systems   Gastrointestinal: Positive for abdominal pain (epigastric area- with fullness). Negative for abdominal distention, constipation, nausea and vomiting. All other systems reviewed and are negative. Vitals:    04/09/18 1720   BP: 160/74   Pulse: (!) 101   Resp: 18   Temp: 96.8 °F (36 °C)   SpO2: 99%   Weight: 136 lb (61.7 kg)   Height: 4' 11\" (1.499 m)       Physical Exam   Constitutional: No distress. HENT:   Mouth/Throat: No oropharyngeal exudate. Eyes: No scleral icterus. Abdominal: Soft. Bowel sounds are normal. She exhibits no distension and no mass. There is no tenderness. There is no rebound and no guarding. Skin: No rash noted. Nursing note and vitals reviewed. MDM    Procedures      ICD-10-CM ICD-9-CM    1. Epigastric pain R10.13 789.06 EKG, 12 LEAD, INITIAL   2. Dyspepsia R10.13 536.8      Medications Ordered Today   Medications    omeprazole (PRILOSEC) 40 mg capsule     Sig: Take 1 Cap by mouth daily. Dispense:  30 Cap     Refill:  0    dicyclomine (BENTYL) 20 mg tablet     Sig: Take 1 Tab by mouth every six (6) hours. Dispense:  12 Tab     Refill:  0     No results found for any visits on 04/09/18. The patients condition was discussed with the patient and they understand. The patient is to follow up with primary care doctor. If signs and symptoms become worse the pt is to go to the ER. The patient is to take medications as prescribed.

## 2018-04-20 ENCOUNTER — HOSPITAL ENCOUNTER (OUTPATIENT)
Dept: MAMMOGRAPHY | Age: 63
Discharge: HOME | End: 2018-04-20
Attending: OBSTETRICS & GYNECOLOGY
Payer: COMMERCIAL

## 2018-04-20 DIAGNOSIS — Z13.820 SCREENING FOR OSTEOPOROSIS: ICD-10-CM

## 2018-04-20 PROCEDURE — 77080 DXA BONE DENSITY AXIAL: CPT

## 2018-04-27 ENCOUNTER — HOSPITAL ENCOUNTER (OUTPATIENT)
Dept: LAB | Age: 63
Discharge: HOME | End: 2018-04-27

## 2018-06-02 ENCOUNTER — OFFICE VISIT (OUTPATIENT)
Dept: URGENT CARE | Age: 63
End: 2018-06-02

## 2018-06-02 VITALS
TEMPERATURE: 97.6 F | RESPIRATION RATE: 16 BRPM | HEART RATE: 100 BPM | OXYGEN SATURATION: 98 % | HEIGHT: 59 IN | WEIGHT: 137 LBS | DIASTOLIC BLOOD PRESSURE: 87 MMHG | SYSTOLIC BLOOD PRESSURE: 180 MMHG | BODY MASS INDEX: 27.62 KG/M2

## 2018-06-02 DIAGNOSIS — M10.9 ACUTE GOUT OF RIGHT FOOT, UNSPECIFIED CAUSE: Primary | ICD-10-CM

## 2018-06-02 DIAGNOSIS — R60.9 SWELLING: ICD-10-CM

## 2018-06-02 RX ORDER — PREDNISONE 10 MG/1
TABLET ORAL
Qty: 33 TAB | Refills: 0 | Status: SHIPPED | OUTPATIENT
Start: 2018-06-02 | End: 2020-06-19

## 2018-06-02 NOTE — MR AVS SNAPSHOT
[address] [place] [address] 
[phone] Patient: Sandy Conway MRN: [id number] Visit Information Date & Time Provider Department Dept. Phone Encounter #  
 6/2/2018 10:00 AM [place] [facility] [phone] [id number] Upcoming Health Maintenance Date Due Hepatitis C Screening 1955 Pneumococcal 19-64 Medium Risk (1 of 1 - PPSV23) 10/3/1975 DTaP/Tdap/Td series (1 - Tdap) 10/3/1977 FOBT Q 1 YEAR AGE 50-75 10/3/2006 PAP AKA CERVICAL CYTOLOGY 7/26/2015 ZOSTER VACCINE AGE 60> 8/3/2016 Influenza Age 5 to Adult 8/1/2018 BREAST CANCER SCRN MAMMOGRAM 12/8/2019 Allergies as of 6/2/2018  Review Complete On: 6/2/2018 By: Mckinley Lugo RN No Known Allergies Current Immunizations  Reviewed on 4/11/2013 Name Date Influenza Vaccine Split 10/3/2012 Not reviewed this visit You Were Diagnosed With   
  
 Codes Comments Acute gout of right foot, unspecified cause    -  Primary ICD-10-CM: M10.9 ICD-9-CM: 274.01 Swelling     ICD-10-CM: R60.9 ICD-9-CM: 270. 3 Vitals BP Pulse Temp Resp Height(growth percentile) Weight(growth percentile) 180/87 100 97.6 °F (36.4 °C) 16 4' 11\" (1.499 m) 137 lb (62.1 kg) SpO2 BMI OB Status Smoking Status 98% 27.67 kg/m2 Postmenopausal Current Every Day Smoker BMI and BSA Data Body Mass Index Body Surface Area  
 27.67 kg/m 2 1.61 m 2 Preferred Pharmacy Pharmacy Name Phone [id number], [address] [place] [address] [phone] Your Updated Medication List  
  
   
This list is accurate as of 6/2/18 11:12 AM.  Always use your most recent med list.  
  
  
  
  
 albuterol 90 mcg/actuation inhaler Commonly known as:  PROVENTIL HFA, VENTOLIN HFA, PROAIR HFA  
 Take 2 puffs by inhalation every six (6) hours as needed for Wheezing. ASPIRIN PO Take 81 mg by mouth daily. atorvastatin 40 mg tablet Commonly known as:  LIPITOR Take 40 mg by mouth daily. dicyclomine 20 mg tablet Commonly known as:  BENTYL Take 1 Tab by mouth every six (6) hours. doxycycline 50 mg capsule Commonly known as:  VIBRAMYCIN Take 50 mg by mouth daily. hydroCHLOROthiazide 12.5 mg tablet Commonly known as:  HYDRODIURIL Take 1 tablet by mouth daily. Needs to schedule ov before further refills. Indications: HYPERTENSION  
  
 irbesartan 300 mg tablet Commonly known as:  AVAPRO Take 1 Tab by mouth daily. montelukast 10 mg tablet Commonly known as:  SINGULAIR Take 10 mg by mouth daily. omeprazole 40 mg capsule Commonly known as:  PRILOSEC Take 1 Cap by mouth daily. predniSONE 10 mg tablet Commonly known as:  Lennart Spearing Take 4 tabs (40mg) daily for 5 days, then take 3 tabs (30mg) daily for 3 days, then take 2 tabs (20mg) for 2 days, then STOP PROBIOTIC PO Take 1 Cap by mouth daily. VITAMIN D3 1,000 unit Cap Generic drug:  cholecalciferol Take 1,000 Units by mouth daily. Prescriptions Sent to Pharmacy Refills  
 predniSONE (DELTASONE) 10 mg tablet 0 Sig: Take 4 tabs (40mg) daily for 5 days, then take 3 tabs (30mg) daily for 3 days, then take 2 tabs (20mg) for 2 days, then STOP Class: Normal  
 Pharmacy: [facility]/pharmacy [address] [place] [address], [address] [place] [address] Ph #: [phone] To-Do List   
 06/02/2018 Imaging:  XR ANKLE LT MIN 3 V   
  
 06/02/2018 Imaging:  XR FOOT RT MIN 3 V Patient Instructions Gout: Care Instructions Your Care Instructions Gout is a form of arthritis caused by a buildup of uric acid crystals in a joint.  It causes sudden attacks of pain, swelling, redness, and stiffness, usually in one joint, especially the big toe. Gout usually comes on without a cause. But it can be brought on by drinking alcohol (especially beer) or eating seafood and red meat. Taking certain medicines, such as diuretics or aspirin, also can bring on an attack of gout. Taking your medicines as prescribed and following up with your doctor regularly can help you avoid gout attacks in the future. Follow-up care is a key part of your treatment and safety. Be sure to make and go to all appointments, and call your doctor if you are having problems. It's also a good idea to know your test results and keep a list of the medicines you take. How can you care for yourself at home? · If the joint is swollen, put ice or a cold pack on the area for 10 to 20 minutes at a time. Put a thin cloth between the ice and your skin. · Prop up the sore limb on a pillow when you ice it or anytime you sit or lie down during the next 3 days. Try to keep it above the level of your heart. This will help reduce swelling. · Rest sore joints. Avoid activities that put weight or strain on the joints for a few days. Take short rest breaks from your regular activities during the day. · Take your medicines exactly as prescribed. Call your doctor if you think you are having a problem with your medicine. · Take pain medicines exactly as directed. ¨ If the doctor gave you a prescription medicine for pain, take it as prescribed. ¨ If you are not taking a prescription pain medicine, ask your doctor if you can take an over-the-counter medicine. · Eat less seafood and red meat. · Check with your doctor before drinking alcohol. · Losing weight, if you are overweight, may help reduce attacks of gout. But do not go on a [employer]. \" Losing a lot of weight in a short amount of time can cause a gout attack. When should you call for help? Call your doctor now or seek immediate medical care if: 
? · You have a fever. ? · The joint is so painful you cannot use it. ? · You have sudden, unexplained swelling, redness, warmth, or severe pain in one or more joints. ? Watch closely for changes in your health, and be sure to contact your doctor if: 
? · You have joint pain. ? · Your symptoms get worse or are not improving after 2 or 3 days. Where can you learn more? Go to http://nuha-kem.info/. Enter F411 in the search box to learn more about \"Gout: Care Instructions. \" Current as of: October 31, 2016 Content Version: 11.4 © 6813-8043 [employer]. Care instructions adapted under license by [employer] (which disclaims liability or warranty for this information). If you have questions about a medical condition or this instruction, always ask your healthcare professional. [address] any warranty or liability for your use of this information. Introducing [facility] & HEALTH SERVICES! Dear Jack Jean: Thank you for requesting a [employer] account. Our records indicate that you already have an active [employer] account. You can access your account anytime at [email or web address]. [email or web address] [email or web address] Did you know that you can access your hospital and ER discharge instructions at any time in [employer]? You can also review all of your test results from your hospital stay or ER visit. Additional Information If you have questions, please visit the Frequently Asked Questions section of the [employer] website at [email or web address]/. Remember, [employer] is NOT to be used for urgent needs. For medical emergencies, dial 911. Now available from your iPhone and Android! Please provide this summary of care documentation to your next provider. Your primary care clinician is listed as Vaishali Lacy. If you have any questions after today's visit, please call [phone].

## 2018-06-02 NOTE — PROGRESS NOTES
Patient is a 65 y.o. female presenting with foot pain. The history is provided by the patient. History limited by: nothing. Foot Pain   Pertinent negatives include no chest pain, no abdominal pain and no shortness of breath. Kim Dunbar is a 65 y.o. female with hx HTN presenting to the clinic today with atraumatic lower extremity pain and swelling. She states her left foot/ankle were swollen and painful last week and that she was evaluated by her PCP, who did not find anything wrong. She states her left lower extremity is resolving, but that now her right lower extremity has begun to be painful and swollen to the ankle and dorsal aspects. Denies injury. Denies history of blood clot, denies use of blood thinners. States that she took extra strength tylenol without relief, reports pain at 9/10 currently in the right lower extremity and denies any pain in the left lower extremity at this time. Is ambulatory. Of note, blood pressure is elevated and patient denies headache, chest pain, shortness of breath, numbness/tingling, or visual disturbances. States she is anxious about her feet.       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    Hypercholesterolemia 2001    Hypertension 2001    Non-seasonal allergic rhinitis 6-2011    dr Teran Lax Osteoporosis 2006        Past Surgical History:   Procedure Laterality Date    COLONOSCOPY N/A 11/17/2017    COLONOSCOPY performed by Paco Castellanos MD at [address] [place] [address], COLON, DIAGNOSTIC  2007    due 2017    HX GYN  1974    OVARIAN CYST REMOVED    HX ORTHOPAEDIC      R hip replacement    NATALIE BIOPSY BREAST STEREOTACTIC Right 2010    benign         Family History   Problem Relation Age of Onset    Breast Cancer Maternal Aunt     Dementia Mother     Hypertension Mother     High Cholesterol Mother     Diabetes Mother     Cancer Father      COLON    Hypertension Sister     Diabetes Sister     Hypertension Brother     Hypertension Sister     High Cholesterol Sister     Hypertension Brother     Anesth Problems Neg Hx         Social History     Social History    Marital status:      Spouse name: N/A    Number of children: N/A    Years of education: N/A     Occupational History    Not on file. Social History Main Topics    Smoking status: Current Every Day Smoker     Packs/day: 0.30     Years: 37.00     Types: Cigarettes    Smokeless tobacco: Never Used    Alcohol use No    Drug use: No    Sexual activity: Yes     Partners: Male     Other Topics Concern    Not on file     Social History Narrative                ALLERGIES: Review of patient's allergies indicates no known allergies. Review of Systems   Constitutional: Negative for chills and fever. HENT: Negative for ear pain, rhinorrhea and sore throat. Eyes: Negative for pain. Respiratory: Negative for cough, chest tightness and shortness of breath. Cardiovascular: Negative for chest pain. Gastrointestinal: Negative for abdominal pain, nausea and vomiting. Endocrine: Negative for cold intolerance. Genitourinary: Negative for dysuria. Musculoskeletal: Positive for arthralgias, gait problem and joint swelling. Skin: Positive for color change. Neurological: Negative for dizziness. Vitals:    06/02/18 1023   BP: 180/87   Pulse: 100   Resp: 16   Temp: 97.6 °F (36.4 °C)   SpO2: 98%   Weight: 137 lb (62.1 kg)   Height: 4' 11\" (1.499 m)       Physical Exam   Constitutional: She is oriented to person, place, and time. She appears well-developed and well-nourished. No distress. HENT:   Head: Normocephalic and atraumatic. Eyes: EOM are normal.   Neck: Normal range of motion. Cardiovascular: Normal rate, regular rhythm and normal heart sounds. Pulmonary/Chest: Effort normal and breath sounds normal. No respiratory distress. Abdominal: Soft.  There is no tenderness. Musculoskeletal:   LEFT LOWER EXTREMITY: Mild swelling, +tenderness to lateral malleolus, DP2+, no erythema. Full ROM. RIGHT LOWER EXTREMITY: swelling to the dorsal aspect of the foot extending towards the great toe. Tenderness to the dorsal aspect of the foot down to the great toe. +erythema and warmth to this same area. DP2+ Diminished ROM with flexion. Neurological: She is alert and oriented to person, place, and time. Skin: Skin is warm and dry. She is not diaphoretic. Psychiatric: She has a normal mood and affect. Her behavior is normal. Judgment and thought content normal.   Nursing note and vitals reviewed. MDM   CLINICAL IMPRESSION:  1. Acute gout of right foot, unspecified cause    2. Swelling        Plan:  Patient presents to clinic with atraumatic BL foot pain, worse on right. Swelling, erythema, and tenderness to RLE. Tenderness to left lateral malleolus. 1. Xrays negative for acute process. 2. Will treat for gout with prednisone taper x10 days. 3. Take OTC NSAIDs for pain relief. No evidence of kidney problems in medical record. Patient states she has Aleve at home and will take it twice daily. 4. Notified patient that her blood pressure is elevated today, she denies symptoms. States she will monitor this with her PCP and continue to take all of her medications as prescribed. 5. Advised patient to follow up with PCP this week. Patient states that this is possible. 6. Discussed low risk of DVTs, but if symptoms do not resolve, patient should have further testing. Patient verbalized understanding. 7. Go to ED with worsening symptoms, failure to improve, or any new symptoms.     Procedures

## 2018-06-02 NOTE — PATIENT INSTRUCTIONS
Gout: Care Instructions  Your Care Instructions    Gout is a form of arthritis caused by a buildup of uric acid crystals in a joint. It causes sudden attacks of pain, swelling, redness, and stiffness, usually in one joint, especially the big toe. Gout usually comes on without a cause. But it can be brought on by drinking alcohol (especially beer) or eating seafood and red meat. Taking certain medicines, such as diuretics or aspirin, also can bring on an attack of gout. Taking your medicines as prescribed and following up with your doctor regularly can help you avoid gout attacks in the future. Follow-up care is a key part of your treatment and safety. Be sure to make and go to all appointments, and call your doctor if you are having problems. It's also a good idea to know your test results and keep a list of the medicines you take. How can you care for yourself at home? · If the joint is swollen, put ice or a cold pack on the area for 10 to 20 minutes at a time. Put a thin cloth between the ice and your skin. · Prop up the sore limb on a pillow when you ice it or anytime you sit or lie down during the next 3 days. Try to keep it above the level of your heart. This will help reduce swelling. · Rest sore joints. Avoid activities that put weight or strain on the joints for a few days. Take short rest breaks from your regular activities during the day. · Take your medicines exactly as prescribed. Call your doctor if you think you are having a problem with your medicine. · Take pain medicines exactly as directed. ¨ If the doctor gave you a prescription medicine for pain, take it as prescribed. ¨ If you are not taking a prescription pain medicine, ask your doctor if you can take an over-the-counter medicine. · Eat less seafood and red meat. · Check with your doctor before drinking alcohol. · Losing weight, if you are overweight, may help reduce attacks of gout. But do not go on a [employer]. \" Losing a lot of weight in a short amount of time can cause a gout attack. When should you call for help? Call your doctor now or seek immediate medical care if:  ? · You have a fever. ? · The joint is so painful you cannot use it. ? · You have sudden, unexplained swelling, redness, warmth, or severe pain in one or more joints. ? Watch closely for changes in your health, and be sure to contact your doctor if:  ? · You have joint pain. ? · Your symptoms get worse or are not improving after 2 or 3 days. Where can you learn more? Go to http://nuha-kem.info/. Enter X944 in the search box to learn more about \"Gout: Care Instructions. \"  Current as of: October 31, 2016  Content Version: 11.4  © 0221-5147 [employer]. Care instructions adapted under license by [employer] (which disclaims liability or warranty for this information). If you have questions about a medical condition or this instruction, always ask your healthcare professional. [place] [address] any warranty or liability for your use of this information.

## 2018-12-17 ENCOUNTER — HOSPITAL ENCOUNTER (OUTPATIENT)
Dept: MAMMOGRAPHY | Age: 63
Discharge: HOME | End: 2018-12-17
Attending: FAMILY MEDICINE
Payer: COMMERCIAL

## 2018-12-17 DIAGNOSIS — Z12.39 SCREENING BREAST EXAMINATION: ICD-10-CM

## 2018-12-17 PROCEDURE — 77063 BREAST TOMOSYNTHESIS BI: CPT

## 2019-12-18 ENCOUNTER — HOSPITAL ENCOUNTER (OUTPATIENT)
Dept: MAMMOGRAPHY | Age: 64
Discharge: HOME | End: 2019-12-18
Attending: FAMILY MEDICINE
Payer: COMMERCIAL

## 2019-12-18 DIAGNOSIS — Z12.31 ENCOUNTER FOR MAMMOGRAM TO ESTABLISH BASELINE MAMMOGRAM: ICD-10-CM

## 2019-12-18 PROCEDURE — 77063 BREAST TOMOSYNTHESIS BI: CPT

## 2020-01-26 ENCOUNTER — APPOINTMENT (OUTPATIENT)
Dept: CT IMAGING | Age: 65
End: 2020-01-26
Attending: EMERGENCY MEDICINE
Payer: COMMERCIAL

## 2020-01-26 ENCOUNTER — HOSPITAL ENCOUNTER (EMERGENCY)
Age: 65
Discharge: HOME | End: 2020-01-26
Attending: EMERGENCY MEDICINE
Payer: COMMERCIAL

## 2020-01-26 VITALS
BODY MASS INDEX: 27.82 KG/M2 | HEIGHT: 59 IN | TEMPERATURE: 98.3 F | SYSTOLIC BLOOD PRESSURE: 127 MMHG | OXYGEN SATURATION: 98 % | RESPIRATION RATE: 20 BRPM | WEIGHT: 138.01 LBS | DIASTOLIC BLOOD PRESSURE: 72 MMHG | HEART RATE: 105 BPM

## 2020-01-26 DIAGNOSIS — N28.9 RENAL LESION: ICD-10-CM

## 2020-01-26 DIAGNOSIS — R10.31 RLQ ABDOMINAL PAIN: Primary | ICD-10-CM

## 2020-01-26 LAB
ALBUMIN SERPL-MCNC: 4 G/DL (ref 3.5–5)
ALBUMIN/GLOB SERPL: 1.3 {RATIO} (ref 1.1–2.2)
ALP SERPL-CCNC: 104 U/L (ref 45–117)
ALT SERPL-CCNC: 17 U/L (ref 12–78)
ANION GAP SERPL CALC-SCNC: 10 MMOL/L (ref 5–15)
APPEARANCE UR: CLEAR
AST SERPL-CCNC: 13 U/L (ref 15–37)
BACTERIA URNS QL MICRO: NEGATIVE /HPF
BASOPHILS # BLD: 0 K/UL (ref 0–0.1)
BASOPHILS NFR BLD: 0 % (ref 0–1)
BILIRUB SERPL-MCNC: 0.6 MG/DL (ref 0.2–1)
BILIRUB UR QL: NEGATIVE
BUN SERPL-MCNC: 11 MG/DL (ref 6–20)
BUN/CREAT SERPL: 15 (ref 12–20)
CALCIUM SERPL-MCNC: 9.1 MG/DL (ref 8.5–10.1)
CHLORIDE SERPL-SCNC: 102 MMOL/L (ref 97–108)
CO2 SERPL-SCNC: 29 MMOL/L (ref 21–32)
COLOR UR: NORMAL
CREAT SERPL-MCNC: 0.71 MG/DL (ref 0.55–1.02)
DIFFERENTIAL METHOD BLD: ABNORMAL
EOSINOPHIL # BLD: 0.1 K/UL (ref 0–0.4)
EOSINOPHIL NFR BLD: 1 % (ref 0–7)
EPITH CASTS URNS QL MICRO: NORMAL /LPF
ERYTHROCYTE [DISTWIDTH] IN BLOOD BY AUTOMATED COUNT: 13 % (ref 11.5–14.5)
GLOBULIN SER CALC-MCNC: 3.2 G/DL (ref 2–4)
GLUCOSE SERPL-MCNC: 105 MG/DL (ref 65–100)
GLUCOSE UR STRIP.AUTO-MCNC: NEGATIVE MG/DL
HCT VFR BLD AUTO: 36.7 % (ref 35–47)
HGB BLD-MCNC: 12.2 G/DL (ref 11.5–16)
HGB UR QL STRIP: NEGATIVE
IMM GRANULOCYTES # BLD AUTO: 0.1 K/UL (ref 0–0.04)
IMM GRANULOCYTES NFR BLD AUTO: 1 % (ref 0–0.5)
KETONES UR QL STRIP.AUTO: NEGATIVE MG/DL
LACTATE SERPL-SCNC: 2.1 MMOL/L (ref 0.4–2)
LEUKOCYTE ESTERASE UR QL STRIP.AUTO: NEGATIVE
LYMPHOCYTES # BLD: 1.7 K/UL (ref 0.8–3.5)
LYMPHOCYTES NFR BLD: 16 % (ref 12–49)
MCH RBC QN AUTO: 30.7 PG (ref 26–34)
MCHC RBC AUTO-ENTMCNC: 33.2 G/DL (ref 30–36.5)
MCV RBC AUTO: 92.4 FL (ref 80–99)
MONOCYTES # BLD: 0.6 K/UL (ref 0–1)
MONOCYTES NFR BLD: 6 % (ref 5–13)
NEUTS SEG # BLD: 8.2 K/UL (ref 1.8–8)
NEUTS SEG NFR BLD: 76 % (ref 32–75)
NITRITE UR QL STRIP.AUTO: NEGATIVE
NRBC # BLD: 0 K/UL (ref 0–0.01)
NRBC BLD-RTO: 0 PER 100 WBC
PH UR STRIP: 7 [PH] (ref 5–8)
PLATELET # BLD AUTO: 241 K/UL (ref 150–400)
PMV BLD AUTO: 9.5 FL (ref 8.9–12.9)
POTASSIUM SERPL-SCNC: 3.5 MMOL/L (ref 3.5–5.1)
PROT SERPL-MCNC: 7.2 G/DL (ref 6.4–8.2)
PROT UR STRIP-MCNC: NEGATIVE MG/DL
RBC # BLD AUTO: 3.97 M/UL (ref 3.8–5.2)
RBC #/AREA URNS HPF: NORMAL /HPF (ref 0–5)
SODIUM SERPL-SCNC: 141 MMOL/L (ref 136–145)
SP GR UR REFRACTOMETRY: 1 (ref 1–1.03)
UR CULT HOLD, URHOLD: NORMAL
UROBILINOGEN UR QL STRIP.AUTO: 0.2 EU/DL (ref 0.2–1)
WBC # BLD AUTO: 10.7 K/UL (ref 3.6–11)
WBC URNS QL MICRO: NORMAL /HPF (ref 0–4)

## 2020-01-26 PROCEDURE — 83605 ASSAY OF LACTIC ACID: CPT

## 2020-01-26 PROCEDURE — 74177 CT ABD & PELVIS W/CONTRAST: CPT

## 2020-01-26 PROCEDURE — 74011636320 HC RX REV CODE- 636/320: Performed by: EMERGENCY MEDICINE

## 2020-01-26 PROCEDURE — 36415 COLL VENOUS BLD VENIPUNCTURE: CPT

## 2020-01-26 PROCEDURE — 85025 COMPLETE CBC W/AUTO DIFF WBC: CPT

## 2020-01-26 PROCEDURE — 74011250636 HC RX REV CODE- 250/636: Performed by: EMERGENCY MEDICINE

## 2020-01-26 PROCEDURE — 80053 COMPREHEN METABOLIC PANEL: CPT

## 2020-01-26 PROCEDURE — 81001 URINALYSIS AUTO W/SCOPE: CPT

## 2020-01-26 PROCEDURE — 99284 EMERGENCY DEPT VISIT MOD MDM: CPT

## 2020-01-26 RX ORDER — SODIUM CHLORIDE 0.9 % (FLUSH) 0.9 %
10 SYRINGE (ML) INJECTION
Status: COMPLETED | OUTPATIENT
Start: 2020-01-26 | End: 2020-01-26

## 2020-01-26 RX ORDER — SODIUM CHLORIDE 9 MG/ML
50 INJECTION, SOLUTION INTRAVENOUS
Status: COMPLETED | OUTPATIENT
Start: 2020-01-26 | End: 2020-01-26

## 2020-01-26 RX ADMIN — SODIUM CHLORIDE 1000 ML: 900 INJECTION, SOLUTION INTRAVENOUS at 09:45

## 2020-01-26 RX ADMIN — SODIUM CHLORIDE 50 ML/HR: 900 INJECTION, SOLUTION INTRAVENOUS at 11:17

## 2020-01-26 RX ADMIN — IOPAMIDOL 100 ML: 755 INJECTION, SOLUTION INTRAVENOUS at 11:17

## 2020-01-26 RX ADMIN — Medication 10 ML: at 11:17

## 2020-01-26 NOTE — ED TRIAGE NOTES
Lower abd pain x1 month, pressure with urinary urgency, hurts when the bending, or getting up from sitting. Pt states the pain radiates to back. Now pain is starting to go into right upper thigh.

## 2020-01-26 NOTE — ED NOTES
Pt d/c'd home. IV d/c'd cath intact. Pt given d/c instructions and pt verbalized understanding. Pt declined w/c and left ambulatory in care of family.

## 2020-01-26 NOTE — ED PROVIDER NOTES
71-year-old female with history of hypertension, hypercholesterolemia presents with complaint of right lower quadrant pain and pressure. She reports she noticed the symptoms about a month ago and discussed it with her primary care. She had made an appointment to see her GYN Dr. Cristobal Velazquez which she is seeing this Thursday 1/30. She states the pain has worsened over the past month and is worse when she goes from sitting to standing position. She reports it radiates down into her groin area and she feels intense pelvic pressure. She states occasionally the pain radiates now down the front of her right leg at times and around to her right lower back. She denies any pain or burning with urination. She reports she sees her GYN yearly. She states she had a normal colonoscopy 3 years ago and is due at age 73 as her dad had colon cancer. She denies any fevers, nausea, vomiting. She reports she has been having normal bowel movements. She declines any medication for pain.            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, COLON, DIAGNOSTIC  2007    due 2017    HX GYN  1974    OVARIAN CYST REMOVED    HX ORTHOPAEDIC      R hip replacement    NATALIE BIOPSY BREAST STEREOTACTIC Right 2010    benign         Family History:   Problem Relation Age of Onset    Breast Cancer Maternal Aunt     Dementia Mother     Hypertension Mother     High Cholesterol Mother     Diabetes Mother     Cancer Father         COLON    Hypertension Sister     Diabetes Sister     Hypertension Brother     Hypertension Sister     High Cholesterol Sister  Hypertension Brother     Anesth Problems Neg Hx        Social History     Socioeconomic History    Marital status:      Spouse name: Not on file    Number of children: Not on file    Years of education: Not on file    Highest education level: Not on file   Occupational History    Not on file   Social Needs    Financial resource strain: Not on file    Food insecurity:     Worry: Not on file     Inability: Not on file    Transportation needs:     Medical: Not on file     Non-medical: Not on file   Tobacco Use    Smoking status: Current Every Day Smoker     Packs/day: 0.30     Years: 37.00     Pack years: 11.10     Types: Cigarettes    Smokeless tobacco: Never Used   Substance and Sexual Activity    Alcohol use: No    Drug use: No    Sexual activity: Yes     Partners: Male   Lifestyle    Physical activity:     Days per week: Not on file     Minutes per session: Not on file    Stress: Not on file   Relationships    Social connections:     Talks on phone: Not on file     Gets together: Not on file     Attends [employer] service: Not on file     Active member of club or organization: Not on file     Attends meetings of clubs or organizations: Not on file     Relationship status: Not on file    Intimate partner violence:     Fear of current or ex partner: Not on file     Emotionally abused: Not on file     Physically abused: Not on file     Forced sexual activity: Not on file   Other Topics Concern    Not on file   Social History Narrative    Not on file         ALLERGIES: Patient has no known allergies. Review of Systems   Constitutional: Negative for fever. Respiratory: Negative for shortness of breath. Cardiovascular: Negative for chest pain. Gastrointestinal: Positive for abdominal pain. Negative for diarrhea, nausea and vomiting. Genitourinary: Negative for dysuria. All other systems reviewed and are negative.       Vitals:    01/26/20 0941   BP: 177/56   Pulse: (!) 105 Resp: 20   Temp: 98.3 °F (36.8 °C)   SpO2: 100%   Weight: 62.6 kg (138 lb 0.1 oz)   Height: 4' 11\" (1.499 m)            Physical Exam  Vitals signs and nursing note reviewed. Constitutional:       General: She is not in acute distress. Appearance: She is well-developed. She is not diaphoretic. HENT:      Head: Normocephalic and atraumatic. Eyes:      Pupils: Pupils are equal, round, and reactive to light. Neck:      Musculoskeletal: Normal range of motion and neck supple. Cardiovascular:      Rate and Rhythm: Normal rate and regular rhythm. Pulses: Normal pulses. Heart sounds: Normal heart sounds. No murmur. No friction rub. Pulmonary:      Effort: Pulmonary effort is normal. No respiratory distress. Breath sounds: Normal breath sounds. No stridor. No wheezing, rhonchi or rales. Chest:      Chest wall: No tenderness. Abdominal:      General: Bowel sounds are normal. There is no distension. Palpations: Abdomen is soft. There is no mass. Tenderness: There is tenderness. There is guarding. There is no rebound. Comments: Patient with right lower quadrant tenderness palpation, no hernia appreciated. No pain along the inguinal ligament and palpable femoral pulse. Musculoskeletal: Normal range of motion. General: No swelling, tenderness, deformity or signs of injury. Skin:     General: Skin is warm and dry. Coloration: Skin is not pale. Neurological:      Mental Status: She is alert and oriented to person, place, and time. Motor: No abnormal muscle tone. Coordination: Coordination normal.   Psychiatric:         Behavior: Behavior normal.          MDM  Number of Diagnoses or Management Options  Renal lesion:   RLQ abdominal pain:   Diagnosis management comments: Less likely appendicitis given amount of time of symptoms however would be concerned for intestinal mass versus pelvic mass causing symptoms versus internal hernia.   While this could be radicular pain from patient's back she does appear quite tender in her abdomen which goes against referred pain. Amount and/or Complexity of Data Reviewed  Clinical lab tests: ordered and reviewed  Tests in the radiology section of CPT®: ordered and reviewed  Obtain history from someone other than the patient: yes (Family)    Patient Progress  Patient progress: stable         Procedures      11:56 AM  Patient's results have been reviewed with them. Patient and/or family have verbally conveyed their understanding and agreement of the patient's signs, symptoms, diagnosis, treatment and prognosis and additionally agree to follow up as recommended or return to the Emergency Room should their condition change prior to follow-up. Discharge instructions have also been provided to the patient with some educational information regarding their diagnosis as well a list of reasons why they would want to return to the ER prior to their follow-up appointment should their condition change. Daughter was requesting ultrasound as well today given CT was negative. Do not have concern for ovarian torsion as this is been going on for over a month. I discussed the patient has an appointment with her GYN on Thursday and they will likely do an ultrasound in the office. I also discussed this with the patient and told the patient to call tomorrow and see if she could be seen sooner. Patient declined any medicine for pain stating she would just take Aleve at home.

## 2020-01-26 NOTE — DISCHARGE INSTRUCTIONS
Patient Education               We hope that we have addressed all of your medical concerns. The examination and treatment you received in the Emergency Department were for an emergent problem and were not intended as complete care. It is important that you follow up with your healthcare provider(s) for ongoing care. If your symptoms worsen or do not improve as expected, and you are unable to reach your usual health care provider(s), you should return to the Emergency Department. Today's healthcare is undergoing tremendous change, and patient satisfaction surveys are one of the many tools to assess the quality of medical care. You may receive a survey from the [employer] organization regarding your experience in the Emergency Department. I hope that your experience has been completely positive, particularly the medical care that I provided. As such, please participate in the survey; anything less than excellent does not meet my expectations or intentions. [facility] and 8 [facility] participate in nationally recognized quality of care measures. If your blood pressure is greater than 120/80, as reported below, we urge that you seek medical care to address the potential of high blood pressure, commonly known as hypertension. Hypertension can be hereditary or can be caused by certain medical conditions, pain, stress, or \"white coat syndrome. \"       Please make an appointment with your health care provider(s) for follow up of your Emergency Department visit. VITALS:   Patient Vitals for the past 8 hrs:   Temp Pulse Resp BP SpO2   01/26/20 1000 -- -- -- 144/85 100 %   01/26/20 0941 98.3 °F (36.8 °C) (!) 105 20 177/56 100 %          Thank you for allowing us to provide you with medical care today. We realize that you have many choices for your emergency care needs. Please choose us in the future for any continued health care needs.       Regards, Ayanna Atwood MD    [facility]   Office: [phone]            Recent Results (from the past 24 hour(s))   CBC WITH AUTOMATED DIFF    Collection Time: 01/26/20  9:48 AM   Result Value Ref Range    WBC 10.7 3.6 - 11.0 K/uL    RBC 3.97 3.80 - 5.20 M/uL    HGB 12.2 11.5 - 16.0 g/dL    HCT 36.7 35.0 - 47.0 %    MCV 92.4 80.0 - 99.0 FL    MCH 30.7 26.0 - 34.0 PG    MCHC 33.2 30.0 - 36.5 g/dL    RDW 13.0 11.5 - 14.5 %    PLATELET 798 152 - 609 K/uL    MPV 9.5 8.9 - 12.9 FL    NRBC 0.0 0  WBC    ABSOLUTE NRBC 0.00 0.00 - 0.01 K/uL    NEUTROPHILS 76 (H) 32 - 75 %    LYMPHOCYTES 16 12 - 49 %    MONOCYTES 6 5 - 13 %    EOSINOPHILS 1 0 - 7 %    BASOPHILS 0 0 - 1 %    IMMATURE GRANULOCYTES 1 (H) 0.0 - 0.5 %    ABS. NEUTROPHILS 8.2 (H) 1.8 - 8.0 K/UL    ABS. LYMPHOCYTES 1.7 0.8 - 3.5 K/UL    ABS. MONOCYTES 0.6 0.0 - 1.0 K/UL    ABS. EOSINOPHILS 0.1 0.0 - 0.4 K/UL    ABS. BASOPHILS 0.0 0.0 - 0.1 K/UL    ABS. IMM. GRANS. 0.1 (H) 0.00 - 0.04 K/UL    DF AUTOMATED     METABOLIC PANEL, COMPREHENSIVE    Collection Time: 01/26/20  9:48 AM   Result Value Ref Range    Sodium 141 136 - 145 mmol/L    Potassium 3.5 3.5 - 5.1 mmol/L    Chloride 102 97 - 108 mmol/L    CO2 29 21 - 32 mmol/L    Anion gap 10 5 - 15 mmol/L    Glucose 105 (H) 65 - 100 mg/dL    BUN 11 6 - 20 MG/DL    Creatinine 0.71 0.55 - 1.02 MG/DL    BUN/Creatinine ratio 15 12 - 20      GFR est AA >60 >60 ml/min/1.73m2    GFR est non-AA >60 >60 ml/min/1.73m2    Calcium 9.1 8.5 - 10.1 MG/DL    Bilirubin, total 0.6 0.2 - 1.0 MG/DL    ALT (SGPT) 17 12 - 78 U/L    AST (SGOT) 13 (L) 15 - 37 U/L    Alk.  phosphatase 104 45 - 117 U/L    Protein, total 7.2 6.4 - 8.2 g/dL    Albumin 4.0 3.5 - 5.0 g/dL    Globulin 3.2 2.0 - 4.0 g/dL    A-G Ratio 1.3 1.1 - 2.2     LACTIC ACID    Collection Time: 01/26/20  9:48 AM   Result Value Ref Range    Lactic acid 2.1 (HH) 0.4 - 2.0 MMOL/L   URINALYSIS W/MICROSCOPIC    Collection Time: 01/26/20 10:50 AM Result Value Ref Range    Color YELLOW/STRAW      Appearance CLEAR CLEAR      Specific gravity 1.005 1.003 - 1.030      pH (UA) 7.0 5.0 - 8.0      Protein NEGATIVE  NEG mg/dL    Glucose NEGATIVE  NEG mg/dL    Ketone NEGATIVE  NEG mg/dL    Bilirubin NEGATIVE  NEG      Blood NEGATIVE  NEG      Urobilinogen 0.2 0.2 - 1.0 EU/dL    Nitrites NEGATIVE  NEG      Leukocyte Esterase NEGATIVE  NEG      WBC 0-4 0 - 4 /hpf    RBC 0-5 0 - 5 /hpf    Epithelial cells FEW FEW /lpf    Bacteria NEGATIVE  NEG /hpf   URINE CULTURE HOLD SAMPLE    Collection Time: 01/26/20 10:50 AM   Result Value Ref Range    Urine culture hold        URINE ON HOLD IN MICROBIOLOGY DEPT FOR 3 DAYS. IF UNPRESERVED URINE IS SUBMITTED, IT CANNOT BE USED FOR ADDITIONAL TESTING AFTER 24 HRS, RECOLLECTION WILL BE REQUIRED. Ct Abd Pelv W Cont    Result Date: 1/26/2020  EXAM: CT ABD PELV W CONT INDICATION: rlq pain COMPARISON: CONTRAST: 100 mL of Isovue-370. TECHNIQUE: Following the uneventful intravenous administration of contrast, thin axial images were obtained through the abdomen and pelvis. Coronal and sagittal reconstructions were generated. Oral contrast was not administered. CT dose reduction was achieved through use of a standardized protocol tailored for this examination and automatic exposure control for dose modulation. FINDINGS: LUNG BASES: Clear. INCIDENTALLY IMAGED HEART AND MEDIASTINUM: Unremarkable. LIVER: No mass or biliary dilatation. There is a 5 mm low-density right lobe of the liver inferiorly too small to characterize but most likely tiny cysts. GALLBLADDER: Unremarkable. SPLEEN: No mass. PANCREAS: No mass or ductal dilatation. ADRENALS: Unremarkable. KIDNEYS: There is a 3.6 cm cystic lesion in the left mid kidney posteriorly. STOMACH: Unremarkable. SMALL BOWEL: No dilatation or wall thickening. COLON: No dilatation or wall thickening. APPENDIX: Not identified. However a distended appendix is not visualized.  PERITONEUM: No ascites or pneumoperitoneum. RETROPERITONEUM: No lymphadenopathy or aortic aneurysm. REPRODUCTIVE ORGANS: Unremarkable URINARY BLADDER: No mass or calculus. BONES: No destructive bone lesion. ADDITIONAL COMMENTS: Patient status post right hip replacement     IMPRESSION: 1. There is a 3.6 cm cystic lesion in the left mid kidney posteriorly. MR is recommended for further assessment to assess for cystic neoplasm. 2. No acute process identified. The appendix is not visualized. A distended appendix is not identified. Abdominal Pain: Care Instructions  Your Care Instructions    Abdominal pain has many possible causes. Some aren't serious and get better on their own in a few days. Others need more testing and treatment. If your pain continues or gets worse, you need to be rechecked and may need more tests to find out what is wrong. You may need surgery to correct the problem. Don't ignore new symptoms, such as fever, nausea and vomiting, urination problems, pain that gets worse, and dizziness. These may be signs of a more serious problem. Your doctor may have recommended a follow-up visit in the next 8 to 12 hours. If you are not getting better, you may need more tests or treatment. The doctor has checked you carefully, but problems can develop later. If you notice any problems or new symptoms, get medical treatment right away. Follow-up care is a key part of your treatment and safety. Be sure to make and go to all appointments, and call your doctor if you are having problems. It's also a good idea to know your test results and keep a list of the medicines you take. How can you care for yourself at home? · Rest until you feel better. · To prevent dehydration, drink plenty of fluids, enough so that your urine is light yellow or clear like water. Choose water and other caffeine-free clear liquids until you feel better.  If you have kidney, heart, or liver disease and have to limit fluids, talk with your doctor before you increase the amount of fluids you drink. · If your stomach is upset, eat mild foods, such as rice, dry toast or crackers, bananas, and applesauce. Try eating several small meals instead of two or three large ones. · Wait until 48 hours after all symptoms have gone away before you have spicy foods, alcohol, and drinks that contain caffeine. · Do not eat foods that are high in fat. · Avoid anti-inflammatory medicines such as aspirin, ibuprofen (Advil, Motrin), and naproxen (Aleve). These can cause stomach upset. Talk to your doctor if you take daily aspirin for another health problem. When should you call for help? Call 911 anytime you think you may need emergency care. For example, call if:    · You passed out (lost consciousness).     · You pass maroon or very bloody stools.     · You vomit blood or what looks like coffee grounds.     · You have new, severe belly pain.    Call your doctor now or seek immediate medical care if:    · Your pain gets worse, especially if it becomes focused in one area of your belly.     · You have a new or higher fever.     · Your stools are black and look like tar, or they have streaks of blood.     · You have unexpected vaginal bleeding.     · You have symptoms of a urinary tract infection. These may include:  ? Pain when you urinate. ? Urinating more often than usual.  ? Blood in your urine.     · You are dizzy or lightheaded, or you feel like you may faint.    Watch closely for changes in your health, and be sure to contact your doctor if:    · You are not getting better after 1 day (24 hours). Where can you learn more? Go to http://nuha-kem.info/. Enter O502 in the search box to learn more about \"Abdominal Pain: Care Instructions. \"  Current as of: June 26, 2019  Content Version: 12.2  © 0722-8777 [employer], [employer]. Care instructions adapted under license by [employer] (which disclaims liability or warranty for this information). If you have questions about a medical condition or this instruction, always ask your healthcare professional. [place] [address] any warranty or liability for your use of this information.

## 2020-01-27 ENCOUNTER — PATIENT OUTREACH (OUTPATIENT)
Dept: OTHER | Age: 65
End: 2020-01-27

## 2020-01-27 NOTE — PROGRESS NOTES
HPRP Initial assesment:    Patient identified as eligible for Employee Care Management. Care Manager contacted the patient, verified  and zip code as identifiers. Provided introduction and explanation of the Nurse Care Manager role. Agreed to CM follow-up and assistance. CM initial assessment completed. 61 yo patient presented to ER at [facility] for RLQ pain on 20. PAin ongoing for about 1 month but has worsened over last week and progressed to point of needing to go to ER on 20. Work up in ED for RLQ pain was negative, but CT showed 3.6cm cystic lesion in left mid kidney, for which MRI follow up was recommended. Patient was advised to see GYN for follow up of RLQ pain. Spoke with patient who reports continued pain today with level rated at 9/10. States she is using Aleve with acceptable pain control. Denies any nausea, vomiting, diarrhea or constipation. No fever or dysuria. Denies any other concerns or problems. Reviewed Red Flag symptoms and when to call MD or return to ER. Patient verbalized understanding. Reports she has an appointment today with GYN for follow up and has plans to carry ER report to PCP and schedule follow up for MRI today. Declined assistance of CM to schedule appointment. 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   Patient verbalizes understanding of discharge instructions and importance of follow-up care. RED FLAGS: Call your doctor or 911 if you:  · You pass out (lost consciousness).  You pass maroon or very bloody stools.  You vomit blood or what looks like coffee grounds.  You have new, severe belly pain.  Your pain gets worse, especially if it becomes focused in one area of your belly.  You have a new or higher fever.  Your stools are black and look like tar, or they have streaks of blood.  You have unexpected vaginal bleeding.  You have symptoms of a urinary tract infection. These may include:  ? Pain when you urinate. ? Urinating more often than usual.  ? Blood in your urine.  You are dizzy or lightheaded, or you feel like you may faint.       Barriers/Challenges to Care: []  Decline in memory    []  Language barrier     []  Emotional       []  Limited mobility  []  Lack of motivation       [] Vision, hearing or cognitive impairment    []  Knowledge    [] Financial    []  Lack of support  [x]  Pain []  Other []  None    INITIAL PLAN OF CARE:    Pain Management postop   Goal:  Demonstrates pain control with current medication regimen within 7 days  · States pain 9/10;  · States using Aleve with fair control;  · States she has Tramadol but cannot tolerate due to too strong    Goal:   Demonstrates no signs of complications or red flags in 30 days;   Review and discuss importance of monitoring and reporting red flags;   Review medications and when taken with patient to ensure understanding;   Educate patient when to call the physician or 911;   Assess for any barriers with care access    1/27/20 No barriers identified     Potential Learning Need  Goal:  Completes all follow-up appointments within 30 days of ED visit;   Educate and encourage importance of FU for prevention of complications or disease progression;   Assess the patients relationship with a PCP and next FU visit scheduled;   States has follow up with GYN scheduled for today 1/27/20   States plans to schedule PCP follow up today   Discuss importance of adherence to treatment plan and follow up visits;   Identify any barriers in transportation or access to FU appointments.  Assist patient with making FU appointments as needed;    Patient declined assistance  o Develop list of questions, concerns before visit.  o Importance of knowing your numbers, test results. o Keep a list of the medicines you take. Review and discussion of initial plan of care with patient, who has provided input to plan, verbalized understanding and agrees with current goals. Upcoming appointments: GYN apt today, 1/27/20      Patient provided opportunity to ask further questions. No other clinical, social, or functional needs identified. Patient verbalized understanding of all information discussed. Patient received my contact information for any further questions, concerns, or needs.     Plan next call:  4 days

## 2020-01-31 ENCOUNTER — PATIENT OUTREACH (OUTPATIENT)
Dept: OTHER | Age: 65
End: 2020-01-31

## 2020-01-31 NOTE — PROGRESS NOTES
HPRP Follow up:  Care Manager contacted the patient by telephone in follow up. Verified  and zip code with patient as identifiers. Spoke with patient who reports she is feeling a little better, still with pain, rates level at 7/10 today. Reports she was seen if follow up by GYN with normal exam. Gyn referred patient to urologist on 20. Urologist is continuing work up to include MRI to evaluate kidney lesion. Patient has follow up with urologist on 20. Patient denies any new concerns of problems. Patient is working and reports activity level normal. Reviewed Red Flag Symptoms with patient which patient understands. Will plan next follow up in 1 to 2 weeks. Advised to call CM for new questions or concerns as needed.     Assessment of clinical changes and knowledge demonstrated since last call:   Ongoing Plan of Care:   Pain Management postop   Goal:  Demonstrates pain control with current medication regimen within 7 days  · States pain 7/10;  · States using Aleve with fair control;  · States she has Tramadol but only takes it when not at work     Goal:   Demonstrates no signs of complications or red flags in 30 days;  · Review and discuss importance of monitoring and reporting red flags;  · Review medications and when taken with patient to ensure understanding;  · Educate patient when to call the physician or 911;  · Assess for any barriers with care access   · 20 No barriers identified  ·    Potential Learning Need  Goal:  Completes all follow-up appointments within 30 days of ED visit;  · Educate and encourage importance of FU for prevention of complications or disease progression;  · Assess the patients relationship with a PCP and next FU visit scheduled;  · States was seen 20 by GYN-normal exam  · Referred to Urologist 20  · Work up in progress by Urologist  · Follow up visit with urologist on 20  · States plans to schedule PCP follow up   · Discuss importance of adherence to treatment plan and follow up visits;  · Identify any barriers in transportation or access to FU appointments.   · Assist patient with making FU appointments as needed;   · Patient declined assistance  ? Develop list of questions, concerns before visit. ? Importance of knowing your numbers, test results. ? Keep a list of the medicines you take.           Review and discussion of plan of care with patient, who has provided input to plan, verbalized understanding and agrees with current goals. Any recurrence Red Flags or continued symptoms:None     Medication Regimen Change:  Completed a review of medications with patient, who verbalized understanding of how and when to take medications. Barriers / Adherence with medications:    Upcoming Appointments:  Urologist 2/26/20    Patient asked questions appropriately and denied any additional needs at this time. Patient verbalized understanding of all information discussed. Patient has my name and contact information for any follow up needs or questions.      Plan next call:   1 to 2 weeks

## 2020-02-13 ENCOUNTER — PATIENT OUTREACH (OUTPATIENT)
Dept: OTHER | Age: 65
End: 2020-02-13

## 2020-02-13 NOTE — PROGRESS NOTES
HPRP f/u:  Telephone attempt to contact patient for Health Promotion and Risk Prevention. Left discreet message on voicemail with this CC contact information. Will follow for one month for transitions of care needs. Next outreach is 10 to 14 days for discussion f/u and possibly Resolve Episode.

## 2020-02-21 ENCOUNTER — PATIENT OUTREACH (OUTPATIENT)
Dept: OTHER | Age: 65
End: 2020-02-21

## 2020-02-21 NOTE — PROGRESS NOTES
HPRP f/u:  Telephone attempt to contact patient for Health Promotion and Risk Prevention. Left discreet message on voicemail with this CC contact information. Will follow for one month for transitions of care needs.     Next outreach is 1 week for discussion f/u and Resolve Episode

## 2020-02-26 ENCOUNTER — APPOINTMENT (OUTPATIENT)
Dept: GENERAL RADIOLOGY | Age: 65
End: 2020-02-26
Attending: EMERGENCY MEDICINE
Payer: COMMERCIAL

## 2020-02-26 ENCOUNTER — HOSPITAL ENCOUNTER (OUTPATIENT)
Age: 65
Setting detail: OBSERVATION
Discharge: HOME | End: 2020-02-27
Attending: EMERGENCY MEDICINE | Admitting: INTERNAL MEDICINE
Payer: COMMERCIAL

## 2020-02-26 DIAGNOSIS — M79.605 LEFT LEG PAIN: Primary | ICD-10-CM

## 2020-02-26 LAB
ANION GAP SERPL CALC-SCNC: 16 MMOL/L (ref 5–15)
APPEARANCE UR: CLEAR
BACTERIA URNS QL MICRO: ABNORMAL /HPF
BILIRUB UR QL: NEGATIVE
BUN SERPL-MCNC: 10 MG/DL (ref 6–20)
BUN/CREAT SERPL: 14 (ref 12–20)
CALCIUM SERPL-MCNC: 8.9 MG/DL (ref 8.5–10.1)
CHLORIDE SERPL-SCNC: 100 MMOL/L (ref 97–108)
CO2 SERPL-SCNC: 23 MMOL/L (ref 21–32)
COLOR UR: ABNORMAL
COMMENT, HOLDF: NORMAL
CREAT SERPL-MCNC: 0.71 MG/DL (ref 0.55–1.02)
EPITH CASTS URNS QL MICRO: ABNORMAL /LPF
ERYTHROCYTE [DISTWIDTH] IN BLOOD BY AUTOMATED COUNT: 13.2 % (ref 11.5–14.5)
GLUCOSE SERPL-MCNC: 107 MG/DL (ref 65–100)
GLUCOSE UR STRIP.AUTO-MCNC: NEGATIVE MG/DL
HCT VFR BLD AUTO: 35.5 % (ref 35–47)
HGB BLD-MCNC: 12 G/DL (ref 11.5–16)
HGB UR QL STRIP: NEGATIVE
KETONES UR QL STRIP.AUTO: ABNORMAL MG/DL
LEUKOCYTE ESTERASE UR QL STRIP.AUTO: NEGATIVE
MCH RBC QN AUTO: 32 PG (ref 26–34)
MCHC RBC AUTO-ENTMCNC: 33.8 G/DL (ref 30–36.5)
MCV RBC AUTO: 94.7 FL (ref 80–99)
NITRITE UR QL STRIP.AUTO: NEGATIVE
NRBC # BLD: 0 K/UL (ref 0–0.01)
NRBC BLD-RTO: 0 PER 100 WBC
PH UR STRIP: 6 [PH] (ref 5–8)
PLATELET # BLD AUTO: 232 K/UL (ref 150–400)
PMV BLD AUTO: 10.1 FL (ref 8.9–12.9)
POTASSIUM SERPL-SCNC: 3.3 MMOL/L (ref 3.5–5.1)
PROT UR STRIP-MCNC: NEGATIVE MG/DL
RBC # BLD AUTO: 3.75 M/UL (ref 3.8–5.2)
RBC #/AREA URNS HPF: ABNORMAL /HPF (ref 0–5)
SAMPLES BEING HELD,HOLD: NORMAL
SODIUM SERPL-SCNC: 139 MMOL/L (ref 136–145)
SP GR UR REFRACTOMETRY: 1.01 (ref 1–1.03)
UR CULT HOLD, URHOLD: NORMAL
UROBILINOGEN UR QL STRIP.AUTO: 0.2 EU/DL (ref 0.2–1)
WBC # BLD AUTO: 18.8 K/UL (ref 3.6–11)
WBC URNS QL MICRO: ABNORMAL /HPF (ref 0–4)

## 2020-02-26 PROCEDURE — 77030034696 HC CATH URETH FOL 2W BARD -A

## 2020-02-26 PROCEDURE — 85027 COMPLETE CBC AUTOMATED: CPT

## 2020-02-26 PROCEDURE — 74011636637 HC RX REV CODE- 636/637: Performed by: EMERGENCY MEDICINE

## 2020-02-26 PROCEDURE — 74011250636 HC RX REV CODE- 250/636: Performed by: EMERGENCY MEDICINE

## 2020-02-26 PROCEDURE — 73502 X-RAY EXAM HIP UNI 2-3 VIEWS: CPT

## 2020-02-26 PROCEDURE — 96375 TX/PRO/DX INJ NEW DRUG ADDON: CPT

## 2020-02-26 PROCEDURE — 81001 URINALYSIS AUTO W/SCOPE: CPT

## 2020-02-26 PROCEDURE — 96374 THER/PROPH/DIAG INJ IV PUSH: CPT

## 2020-02-26 PROCEDURE — 80048 BASIC METABOLIC PNL TOTAL CA: CPT

## 2020-02-26 PROCEDURE — 36415 COLL VENOUS BLD VENIPUNCTURE: CPT

## 2020-02-26 PROCEDURE — 74011250637 HC RX REV CODE- 250/637: Performed by: EMERGENCY MEDICINE

## 2020-02-26 PROCEDURE — 99285 EMERGENCY DEPT VISIT HI MDM: CPT

## 2020-02-26 RX ORDER — PREDNISONE 20 MG/1
20 TABLET ORAL
Status: COMPLETED | OUTPATIENT
Start: 2020-02-26 | End: 2020-02-26

## 2020-02-26 RX ORDER — HYDROCODONE BITARTRATE AND ACETAMINOPHEN 5; 325 MG/1; MG/1
1 TABLET ORAL
Status: COMPLETED | OUTPATIENT
Start: 2020-02-26 | End: 2020-02-26

## 2020-02-26 RX ORDER — ACETAMINOPHEN 500 MG
1000 TABLET ORAL ONCE
Status: COMPLETED | OUTPATIENT
Start: 2020-02-26 | End: 2020-02-26

## 2020-02-26 RX ORDER — KETOROLAC TROMETHAMINE 30 MG/ML
30 INJECTION, SOLUTION INTRAMUSCULAR; INTRAVENOUS ONCE
Status: COMPLETED | OUTPATIENT
Start: 2020-02-26 | End: 2020-02-26

## 2020-02-26 RX ORDER — HYDROMORPHONE HYDROCHLORIDE 1 MG/ML
1 INJECTION, SOLUTION INTRAMUSCULAR; INTRAVENOUS; SUBCUTANEOUS ONCE
Status: COMPLETED | OUTPATIENT
Start: 2020-02-26 | End: 2020-02-26

## 2020-02-26 RX ORDER — DIAZEPAM 2 MG/1
2 TABLET ORAL
Status: COMPLETED | OUTPATIENT
Start: 2020-02-26 | End: 2020-02-26

## 2020-02-26 RX ADMIN — HYDROMORPHONE HYDROCHLORIDE 1 MG: 1 INJECTION, SOLUTION INTRAMUSCULAR; INTRAVENOUS; SUBCUTANEOUS at 22:46

## 2020-02-26 RX ADMIN — PREDNISONE 20 MG: 20 TABLET ORAL at 21:22

## 2020-02-26 RX ADMIN — KETOROLAC TROMETHAMINE 30 MG: 30 INJECTION, SOLUTION INTRAMUSCULAR at 21:09

## 2020-02-26 RX ADMIN — ACETAMINOPHEN 1000 MG: 500 TABLET ORAL at 21:09

## 2020-02-26 RX ADMIN — HYDROCODONE BITARTRATE AND ACETAMINOPHEN 1 TABLET: 5; 325 TABLET ORAL at 22:11

## 2020-02-26 RX ADMIN — DIAZEPAM 2 MG: 2 TABLET ORAL at 22:11

## 2020-02-27 ENCOUNTER — APPOINTMENT (OUTPATIENT)
Dept: MRI IMAGING | Age: 65
End: 2020-02-27
Attending: INTERNAL MEDICINE
Payer: COMMERCIAL

## 2020-02-27 ENCOUNTER — APPOINTMENT (OUTPATIENT)
Dept: MRI IMAGING | Age: 65
End: 2020-02-27
Attending: PHYSICIAN ASSISTANT
Payer: COMMERCIAL

## 2020-02-27 VITALS
BODY MASS INDEX: 29.38 KG/M2 | DIASTOLIC BLOOD PRESSURE: 67 MMHG | HEART RATE: 97 BPM | OXYGEN SATURATION: 98 % | SYSTOLIC BLOOD PRESSURE: 122 MMHG | HEIGHT: 59 IN | RESPIRATION RATE: 16 BRPM | WEIGHT: 145.72 LBS | TEMPERATURE: 98.1 F

## 2020-02-27 PROBLEM — M79.606 LEG PAIN: Status: ACTIVE | Noted: 2020-02-27

## 2020-02-27 LAB
ANION GAP SERPL CALC-SCNC: 7 MMOL/L (ref 5–15)
BASOPHILS # BLD: 0 K/UL (ref 0–0.1)
BASOPHILS NFR BLD: 0 % (ref 0–1)
BUN SERPL-MCNC: 12 MG/DL (ref 6–20)
BUN/CREAT SERPL: 15 (ref 12–20)
CALCIUM SERPL-MCNC: 8.8 MG/DL (ref 8.5–10.1)
CHLORIDE SERPL-SCNC: 105 MMOL/L (ref 97–108)
CO2 SERPL-SCNC: 23 MMOL/L (ref 21–32)
CREAT SERPL-MCNC: 0.78 MG/DL (ref 0.55–1.02)
CRP SERPL-MCNC: 1.44 MG/DL (ref 0–0.6)
DIFFERENTIAL METHOD BLD: ABNORMAL
EOSINOPHIL # BLD: 0 K/UL (ref 0–0.4)
EOSINOPHIL NFR BLD: 0 % (ref 0–7)
ERYTHROCYTE [DISTWIDTH] IN BLOOD BY AUTOMATED COUNT: 13.2 % (ref 11.5–14.5)
ERYTHROCYTE [SEDIMENTATION RATE] IN BLOOD: 41 MM/HR (ref 0–30)
GLUCOSE SERPL-MCNC: 151 MG/DL (ref 65–100)
HCT VFR BLD AUTO: 34.7 % (ref 35–47)
HGB BLD-MCNC: 11.5 G/DL (ref 11.5–16)
IMM GRANULOCYTES # BLD AUTO: 0.1 K/UL (ref 0–0.04)
IMM GRANULOCYTES NFR BLD AUTO: 1 % (ref 0–0.5)
LYMPHOCYTES # BLD: 0.7 K/UL (ref 0.8–3.5)
LYMPHOCYTES NFR BLD: 5 % (ref 12–49)
MCH RBC QN AUTO: 31.2 PG (ref 26–34)
MCHC RBC AUTO-ENTMCNC: 33.1 G/DL (ref 30–36.5)
MCV RBC AUTO: 94 FL (ref 80–99)
MONOCYTES # BLD: 0.6 K/UL (ref 0–1)
MONOCYTES NFR BLD: 4 % (ref 5–13)
NEUTS SEG # BLD: 12.9 K/UL (ref 1.8–8)
NEUTS SEG NFR BLD: 90 % (ref 32–75)
NRBC # BLD: 0 K/UL (ref 0–0.01)
NRBC BLD-RTO: 0 PER 100 WBC
PLATELET # BLD AUTO: 200 K/UL (ref 150–400)
PMV BLD AUTO: 9.5 FL (ref 8.9–12.9)
POTASSIUM SERPL-SCNC: 3.8 MMOL/L (ref 3.5–5.1)
RBC # BLD AUTO: 3.69 M/UL (ref 3.8–5.2)
RBC MORPH BLD: ABNORMAL
SODIUM SERPL-SCNC: 135 MMOL/L (ref 136–145)
WBC # BLD AUTO: 14.3 K/UL (ref 3.6–11)

## 2020-02-27 PROCEDURE — 77030036660

## 2020-02-27 PROCEDURE — 73721 MRI JNT OF LWR EXTRE W/O DYE: CPT

## 2020-02-27 PROCEDURE — 36415 COLL VENOUS BLD VENIPUNCTURE: CPT

## 2020-02-27 PROCEDURE — 97116 GAIT TRAINING THERAPY: CPT

## 2020-02-27 PROCEDURE — 99218 HC RM OBSERVATION: CPT

## 2020-02-27 PROCEDURE — 74011250636 HC RX REV CODE- 250/636: Performed by: INTERNAL MEDICINE

## 2020-02-27 PROCEDURE — 80048 BASIC METABOLIC PNL TOTAL CA: CPT

## 2020-02-27 PROCEDURE — 85025 COMPLETE CBC W/AUTO DIFF WBC: CPT

## 2020-02-27 PROCEDURE — 86140 C-REACTIVE PROTEIN: CPT

## 2020-02-27 PROCEDURE — 85652 RBC SED RATE AUTOMATED: CPT

## 2020-02-27 PROCEDURE — 96375 TX/PRO/DX INJ NEW DRUG ADDON: CPT

## 2020-02-27 PROCEDURE — 72148 MRI LUMBAR SPINE W/O DYE: CPT

## 2020-02-27 PROCEDURE — 97161 PT EVAL LOW COMPLEX 20 MIN: CPT

## 2020-02-27 RX ORDER — OXYCODONE HYDROCHLORIDE 5 MG/1
5 TABLET ORAL
Status: DISCONTINUED | OUTPATIENT
Start: 2020-02-27 | End: 2020-02-27 | Stop reason: HOSPADM

## 2020-02-27 RX ORDER — LORAZEPAM 2 MG/ML
1 INJECTION INTRAMUSCULAR
Status: COMPLETED | OUTPATIENT
Start: 2020-02-27 | End: 2020-02-27

## 2020-02-27 RX ORDER — OXYCODONE HYDROCHLORIDE 5 MG/1
2.5 TABLET ORAL
Qty: 10 TAB | Refills: 0 | Status: SHIPPED | OUTPATIENT
Start: 2020-02-27 | End: 2020-03-01

## 2020-02-27 RX ORDER — KETOROLAC TROMETHAMINE 30 MG/ML
15 INJECTION, SOLUTION INTRAMUSCULAR; INTRAVENOUS EVERY 6 HOURS
Status: DISCONTINUED | OUTPATIENT
Start: 2020-02-27 | End: 2020-02-27 | Stop reason: HOSPADM

## 2020-02-27 RX ORDER — SODIUM CHLORIDE 0.9 % (FLUSH) 0.9 %
5-40 SYRINGE (ML) INJECTION AS NEEDED
Status: DISCONTINUED | OUTPATIENT
Start: 2020-02-27 | End: 2020-02-27 | Stop reason: HOSPADM

## 2020-02-27 RX ORDER — ACETAMINOPHEN 325 MG/1
650 TABLET ORAL EVERY 6 HOURS
Status: DISCONTINUED | OUTPATIENT
Start: 2020-02-27 | End: 2020-02-27 | Stop reason: HOSPADM

## 2020-02-27 RX ORDER — SODIUM CHLORIDE 0.9 % (FLUSH) 0.9 %
5-40 SYRINGE (ML) INJECTION EVERY 8 HOURS
Status: DISCONTINUED | OUTPATIENT
Start: 2020-02-27 | End: 2020-02-27 | Stop reason: HOSPADM

## 2020-02-27 RX ORDER — HYDROMORPHONE HYDROCHLORIDE 1 MG/ML
1 INJECTION, SOLUTION INTRAMUSCULAR; INTRAVENOUS; SUBCUTANEOUS
Status: DISCONTINUED | OUTPATIENT
Start: 2020-02-27 | End: 2020-02-27 | Stop reason: HOSPADM

## 2020-02-27 RX ORDER — HYDRALAZINE HYDROCHLORIDE 20 MG/ML
10 INJECTION INTRAMUSCULAR; INTRAVENOUS
Status: DISCONTINUED | OUTPATIENT
Start: 2020-02-27 | End: 2020-02-27 | Stop reason: HOSPADM

## 2020-02-27 RX ADMIN — LORAZEPAM 1 MG: 2 INJECTION INTRAMUSCULAR; INTRAVENOUS at 09:49

## 2020-02-27 NOTE — PROGRESS NOTES
Problem: Pressure Injury - Risk of  Goal: *Prevention of pressure injury  Description  Document Mainor Scale and appropriate interventions in the flowsheet. Outcome: Progressing Towards Goal  Note: Pressure Injury Interventions:  Sensory Interventions: Assess changes in LOC    Moisture Interventions: Absorbent underpads    Activity Interventions: Increase time out of bed, Pressure redistribution bed/mattress(bed type), PT/OT evaluation    Mobility Interventions: HOB 30 degrees or less, Pressure redistribution bed/mattress (bed type), PT/OT evaluation    Nutrition Interventions: Document food/fluid/supplement intake    Friction and Shear Interventions: Lift sheet, HOB 30 degrees or less, Minimize layers    Problem: Falls - Risk of  Goal: *Absence of Falls  Description  Document Maral Fall Risk and appropriate interventions in the flowsheet.   Outcome: Progressing Towards Goal  Note: Fall Risk Interventions:  Mobility Interventions: Communicate number of staff needed for ambulation/transfer, Patient to call before getting OOB    Medication Interventions: Evaluate medications/consider consulting pharmacy, Patient to call before getting OOB, Teach patient to arise slowly    Elimination Interventions: Call light in reach, Patient to call for help with toileting needs, Toileting schedule/hourly rounds

## 2020-02-27 NOTE — CONSULTS
ORTHOPAEDIC CONSULT NOTE    Subjective:     Date of Consultation:  February 27, 2020  Referring Physician:  Ken Grewal is a 62 y.o. female with PMH significant for HTN and arthritis s/p right CURTIS by Dr Diana Garcia in 2017 is seen for left thigh and groin pain. Patient states that her pain started in her left thigh yesterday evening without accident or injury and progressed to the point of being unable to bear weight in only a few hours. She states that she underwent multiple CTs yesterday through [facility] but that she was walking normally after these and went shopping for a while in the afternoon. She reports a few weeks of occasional low back \"twinges\" of pain but nothing incapacitating. She states that her pain is minimal at rest but worsened with movement. She denies any past issues with her left hip. She denies tingling or numbness. She denies any bowel or bladder changes.     Patient Active Problem List    Diagnosis Date Noted    Leg pain 02/27/2020    Primary osteoarthritis of right hip 03/15/2017    Hypercholesterolemia     Hypertension     Cyst     Osteoporosis 08/08/2011    Allergic rhinitis, cause unspecified 08/08/2011    Allergic asthma 08/08/2011    Other and unspecified hyperlipidemia 08/08/2011    Essential hypertension, benign 08/08/2011    Non-seasonal allergic rhinitis 06/01/2011     Family History   Problem Relation Age of Onset    Breast Cancer Maternal Aunt     Dementia Mother     Hypertension Mother     High Cholesterol Mother     Diabetes Mother     Cancer Father         COLON    Hypertension Sister     Diabetes Sister     Hypertension Brother     Hypertension Sister     High Cholesterol Sister     Hypertension Brother     Anesth Problems Neg Hx       Social History     Tobacco Use    Smoking status: Current Every Day Smoker     Packs/day: 0.30     Years: 37.00     Pack years: 11.10     Types: Cigarettes    Smokeless tobacco: Never Used   Substance Use Topics    Alcohol use: No     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    Hypercholesterolemia 2001    Hypertension 2001    Non-seasonal allergic rhinitis 6-2011    dr Olimpia Bains Osteoporosis 2006      Past Surgical History:   Procedure Laterality Date    COLONOSCOPY N/A 11/17/2017    COLONOSCOPY performed by Shayy Mohan MD at [address], DIAGNOSTIC  2007    due 2017    HX GYN  1974    OVARIAN CYST REMOVED    HX ORTHOPAEDIC      R hip replacement    NATALIE BIOPSY BREAST STEREOTACTIC Right 2010    benign      Prior to Admission medications    Medication Sig Start Date End Date Taking? Authorizing Provider   dicyclomine (BENTYL) 20 mg tablet Take 1 Tab by mouth every six (6) hours. 4/9/18  Yes West Singletary MD   ASPIRIN PO Take 81 mg by mouth daily. Yes Provider, Historical   cholecalciferol (VITAMIN D3) 1,000 unit cap Take 1,000 Units by mouth daily. Yes Provider, Historical   Hydrochlorothiazide (HYDRODIURIL) 12.5 mg tablet Take 1 tablet by mouth daily. Needs to schedule ov before further refills. Indications: HYPERTENSION 9/18/14  Yes Bob Hinojosa NP   irbesartan (AVAPRO) 300 mg tablet Take 1 Tab by mouth daily. 1/28/14  Yes Bob Hinojosa NP   predniSONE (DELTASONE) 10 mg tablet Take 4 tabs (40mg) daily for 5 days, then take 3 tabs (30mg) daily for 3 days, then take 2 tabs (20mg) for 2 days, then STOP 6/2/18   Kallie Coppola NP   omeprazole (PRILOSEC) 40 mg capsule Take 1 Cap by mouth daily. 4/9/18   West Singletary MD   doxycycline (VIBRAMYCIN) 50 mg capsule Take 50 mg by mouth daily. Provider, Historical   LACTOBACILLUS ACIDOPHILUS (PROBIOTIC PO) Take 1 Cap by mouth daily. Provider, Historical   atorvastatin (LIPITOR) 40 mg tablet Take 40 mg by mouth daily. Provider, Historical   montelukast (SINGULAIR) 10 mg tablet Take 10 mg by mouth daily. Provider, Historical   albuterol (PROVENTIL HFA, VENTOLIN HFA, PROAIR HFA) 90 mcg/actuation inhaler Take 2 puffs by inhalation every six (6) hours as needed for Wheezing. 2/5/15   Renetta Fix, DO     Current Facility-Administered Medications   Medication Dose Route Frequency    sodium chloride (NS) flush 5-40 mL  5-40 mL IntraVENous Q8H    sodium chloride (NS) flush 5-40 mL  5-40 mL IntraVENous PRN    HYDROmorphone (PF) (DILAUDID) injection 1 mg  1 mg IntraVENous Q4H PRN    hydrALAZINE (APRESOLINE) 20 mg/mL injection 10 mg  10 mg IntraVENous Q6H PRN      No Known Allergies     Review of Systems:  Pertinent items are noted in HPI.     Mental Status: no dementia    Objective:     Patient Vitals for the past 8 hrs:   BP Temp Pulse Resp SpO2   20 0752 123/72 98.5 °F (36.9 °C) 90 16 98 %   20 0354 145/71 97.7 °F (36.5 °C) 99 16 96 %     Temp (24hrs), Av °F (36.7 °C), Min:97.7 °F (36.5 °C), Max:98.5 °F (36.9 °C)      EXAM: Awake and alert lying in bed; NAD; agreeable to exam  Leg lengths equal  Lumbar spine NTTP  Mild TTP about the left greater trochanter  Straight leg raises with ease on the right but with mild left sided discomfort; unable to straight leg raise on left due to pain; activates quads to command  Passive range of left hip increases groin pain with only limited movement  5/5 strength for bilateral ankle DF/PF  Distal sensory function grossly intact    Imaging Review: Plain images of left hip reveal at least mild degenerative changes which appear slightly more severe on an abd/pelv CT from ; no acute pathology    Labs:   Recent Results (from the past 24 hour(s))   CBC W/O DIFF    Collection Time: 20  9:12 PM   Result Value Ref Range    WBC 18.8 (H) 3.6 - 11.0 K/uL    RBC 3.75 (L) 3.80 - 5.20 M/uL    HGB 12.0 11.5 - 16.0 g/dL    HCT 35.5 35.0 - 47.0 %    MCV 94.7 80.0 - 99.0 FL    MCH 32.0 26.0 - 34.0 PG    MCHC 33.8 30.0 - 36.5 g/dL    RDW 13.2 11.5 - 14.5 %    PLATELET 351 150 - 400 K/uL    MPV 10.1 8.9 - 12.9 FL    NRBC 0.0 0  WBC    ABSOLUTE NRBC 0.00 0.00 - 3.71 K/uL   METABOLIC PANEL, BASIC    Collection Time: 02/26/20  9:12 PM   Result Value Ref Range    Sodium 139 136 - 145 mmol/L    Potassium 3.3 (L) 3.5 - 5.1 mmol/L    Chloride 100 97 - 108 mmol/L    CO2 23 21 - 32 mmol/L    Anion gap 16 (H) 5 - 15 mmol/L    Glucose 107 (H) 65 - 100 mg/dL    BUN 10 6 - 20 MG/DL    Creatinine 0.71 0.55 - 1.02 MG/DL    BUN/Creatinine ratio 14 12 - 20      GFR est AA >60 >60 ml/min/1.73m2    GFR est non-AA >60 >60 ml/min/1.73m2    Calcium 8.9 8.5 - 10.1 MG/DL   SAMPLES BEING HELD    Collection Time: 02/26/20  9:13 PM   Result Value Ref Range    SAMPLES BEING HELD 1LAV,1PST,1BLU,1RED     COMMENT        Add-on orders for these samples will be processed based on acceptable specimen integrity and analyte stability, which may vary by analyte. URINALYSIS W/MICROSCOPIC    Collection Time: 02/26/20 10:15 PM   Result Value Ref Range    Color YELLOW/STRAW      Appearance CLEAR CLEAR      Specific gravity 1.015 1.003 - 1.030      pH (UA) 6.0 5.0 - 8.0      Protein NEGATIVE  NEG mg/dL    Glucose NEGATIVE  NEG mg/dL    Ketone TRACE (A) NEG mg/dL    Bilirubin NEGATIVE  NEG      Blood NEGATIVE  NEG      Urobilinogen 0.2 0.2 - 1.0 EU/dL    Nitrites NEGATIVE  NEG      Leukocyte Esterase NEGATIVE  NEG      WBC 0-4 0 - 4 /hpf    RBC 0-5 0 - 5 /hpf    Epithelial cells MODERATE (A) FEW /lpf    Bacteria 1+ (A) NEG /hpf   URINE CULTURE HOLD SAMPLE    Collection Time: 02/26/20 10:15 PM   Result Value Ref Range    Urine culture hold        Urine on hold in Microbiology dept for 2 days. If unpreserved urine is submitted, it cannot be used for addtional testing after 24 hours, recollection will be required.    SED RATE (ESR)    Collection Time: 02/27/20  5:28 AM   Result Value Ref Range    Sed rate, automated 41 (H) 0 - 30 mm/hr   C REACTIVE PROTEIN, QT    Collection Time: 02/27/20  5:28 AM   Result Value Ref Range    C-Reactive protein 1.44 (H) 0.00 - 0.60 mg/dL   CBC WITH AUTOMATED DIFF    Collection Time: 02/27/20  5:28 AM   Result Value Ref Range    WBC 14.3 (H) 3.6 - 11.0 K/uL    RBC 3.69 (L) 3.80 - 5.20 M/uL    HGB 11.5 11.5 - 16.0 g/dL    HCT 34.7 (L) 35.0 - 47.0 %    MCV 94.0 80.0 - 99.0 FL    MCH 31.2 26.0 - 34.0 PG    MCHC 33.1 30.0 - 36.5 g/dL    RDW 13.2 11.5 - 14.5 %    PLATELET 195 488 - 104 K/uL    MPV 9.5 8.9 - 12.9 FL    NRBC 0.0 0  WBC    ABSOLUTE NRBC 0.00 0.00 - 0.01 K/uL    NEUTROPHILS 90 (H) 32 - 75 %    LYMPHOCYTES 5 (L) 12 - 49 %    MONOCYTES 4 (L) 5 - 13 %    EOSINOPHILS 0 0 - 7 %    BASOPHILS 0 0 - 1 %    IMMATURE GRANULOCYTES 1 (H) 0.0 - 0.5 %    ABS. NEUTROPHILS 12.9 (H) 1.8 - 8.0 K/UL    ABS. LYMPHOCYTES 0.7 (L) 0.8 - 3.5 K/UL    ABS. MONOCYTES 0.6 0.0 - 1.0 K/UL    ABS. EOSINOPHILS 0.0 0.0 - 0.4 K/UL    ABS. BASOPHILS 0.0 0.0 - 0.1 K/UL    ABS. IMM.  GRANS. 0.1 (H) 0.00 - 0.04 K/UL    DF SMEAR SCANNED      RBC COMMENTS NORMOCYTIC, NORMOCHROMIC     METABOLIC PANEL, BASIC    Collection Time: 02/27/20  5:28 AM   Result Value Ref Range    Sodium 135 (L) 136 - 145 mmol/L    Potassium 3.8 3.5 - 5.1 mmol/L    Chloride 105 97 - 108 mmol/L    CO2 23 21 - 32 mmol/L    Anion gap 7 5 - 15 mmol/L    Glucose 151 (H) 65 - 100 mg/dL    BUN 12 6 - 20 MG/DL    Creatinine 0.78 0.55 - 1.02 MG/DL    BUN/Creatinine ratio 15 12 - 20      GFR est AA >60 >60 ml/min/1.73m2    GFR est non-AA >60 >60 ml/min/1.73m2    Calcium 8.8 8.5 - 10.1 MG/DL         Impression:     Active Problems:    Leg pain (2/27/2020)        Plan:     Acute left thigh and groin pain - acuity of onset of pain would be surprising for degenerative changes noted on imaging and soft tissue compromise of hip (labral tear) would be uncommon without an injury as well  MRI ordered already of lumbar spine which is reasonable but will add MRI of hip to rule out soft tissue damage or occult fracture  May need ativan for MRI completion  Medical management per primary team  Following for imaging results    Dr Olga Parra aware of patient and in agreement with current plan    Luis F Jesus PA-C  Orthopedic Trauma Service  [address] [facility]

## 2020-02-27 NOTE — PROGRESS NOTES
PHYSICAL THERAPY EVALUATION/DISCHARGE  Patient: Benita Girard (56 y.o. female)  Date: 2/27/2020  Primary Diagnosis: Leg pain [M79.606]       Precautions:          ASSESSMENT  Based on the objective data described below, the patient presents with decreased mobility due to L hip pain related to AVN and labral tear. She had a R CURTIS in the past and has had good results from that. She was able to walk using the walker, though antalgic gait persisted. There was no giving way of the LLE. Discussed methods to manage pain with modifying gait and use of ice. She plans to contact Dr. Sohan Kong to follow up on her L hip pain. She has no further acute PT needs at this time. Functional Outcome Measure: The patient scored 90 on the Barthel outcome measure which is indicative of minimal disability. Other factors to consider for discharge: none     Further skilled acute physical therapy is not indicated at this time. PLAN :  Recommendation for discharge: (in order for the patient to meet his/her long term goals)  No skilled physical therapy/ follow up rehabilitation needs identified at this time.     This discharge recommendation:  Has been made in collaboration with the attending provider and/or case management    IF patient discharges home will need the following DME: patient owns DME required for discharge       SUBJECTIVE:   Patient stated I need to get this taken care of.    OBJECTIVE DATA SUMMARY:   HISTORY:    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    Hypercholesterolemia 2001    Hypertension 2001    Non-seasonal allergic rhinitis 6-2011    dr Rose Lowe Osteoporosis 2006     Past Surgical History:   Procedure Laterality Date    COLONOSCOPY N/A 11/17/2017    COLONOSCOPY performed by Marsha Ribera MD at [address], DIAGNOSTIC  2007    due 2017    [id number] REMOVED    HX ORTHOPAEDIC      R hip replacement    NATALIE BIOPSY BREAST STEREOTACTIC Right 2010    benign       Prior level of function: independent, lives alone and works as a   Personal factors and/or comorbidities impacting plan of care: L hip pain    Home Situation  Home Environment: Private residence  # Steps to Enter: 5  One/Two Story Residence: One story  # of Interior Steps: 0  Height of Each Step (in): 0 inches  Interior Rails: None  Lift Chair Available: No  Living Alone: Yes  Support Systems: Child(john), Family member(s)  Patient Expects to be Discharged to[de-identified] Private residence  Current DME Used/Available at Home: Mireya Farah    EXAMINATION/PRESENTATION/DECISION MAKING:   Critical Behavior:              Hearing: Auditory  Auditory Impairment: None  Skin:  intact  Edema: none  Range Of Motion:  AROM: (painful at end range for L hip in all directions)                       Strength:    Strength: (L hip limited by pain but otherwise [facility])                    Tone & Sensation:   Tone: Normal              Sensation: Intact               Coordination:  Coordination: Within functional limits  Vision:      Functional Mobility:  Bed Mobility:     Supine to Sit: Modified independent; Additional time  Sit to Supine: Modified independent; Additional time     Transfers:  Sit to Stand: Modified independent; Additional time; Adaptive equipment  Stand to Sit: Modified independent; Adaptive equipment; Additional time        Bed to Chair: Modified independent; Adaptive equipment; Additional time              Balance:   Sitting: Intact; Without support  Standing: Intact; With support  Ambulation/Gait Training:  Distance (ft): 200 Feet (ft)  Assistive Device: Gait belt;Walker, rolling  Ambulation - Level of Assistance: Modified independent; Additional time; Adaptive equipment        Gait Abnormalities: Antalgic;Decreased step clearance        Base of Support: Widened;Shift to right  Stance: Left decreased  Speed/Janie: Lise Whalen decreased (<100 feet/min)  Step Length: Left lengthened  Swing Pattern: Left asymmetrical                  Stairs: Therapeutic Exercises:   Abdominal isometrics    Functional Measure:  Barthel Index:    Bathin  Bladder: 10  Bowels: 10  Groomin  Dressing: 10  Feeding: 10  Mobility: 15  Stairs: 5(inferred)  Toilet Use: 10  Transfer (Bed to Chair and Back): 15  Total: 90/100       The Barthel ADL Index: Guidelines  1. The index should be used as a record of what a patient does, not as a record of what a patient could do. 2. The main aim is to establish degree of independence from any help, physical or verbal, however minor and for whatever reason. 3. The need for supervision renders the patient not independent. 4. A patient's performance should be established using the best available evidence. Asking the patient, friends/relatives and nurses are the usual sources, but direct observation and common sense are also important. However direct testing is not needed. 5. Usually the patient's performance over the preceding 24-48 hours is important, but occasionally longer periods will be relevant. 6. Middle categories imply that the patient supplies over 50 per cent of the effort. 7. Use of aids to be independent is allowed. Abigail Meyer., Barthel, D.W. (2415). Functional evaluation: the Barthel Index. [facility] (14)2. LADAN Coronado, Ricky Tarango., Roberto Nicole., [place], [address] [place] [address] (). Measuring the change indisability after inpatient rehabilitation; comparison of the responsiveness of the Barthel Index and Functional [place] Measure. Journal of Neurology, Neurosurgery, and Psychiatry, 66(4), 769-373. SHAHBAZ Boland.A, ESTUARDO Carver, & Nereida Arellano M.A. (2004.) Assessment of post-stroke quality of life in cost-effectiveness studies: The usefulness of the Barthel Index and the EuroQoL-5D.  Quality of Life Research, 15, 195-80          Physical Therapy Evaluation Charge Determination   History Examination Presentation Decision-Making   MEDIUM  Complexity : 1-2 comorbidities / personal factors will impact the outcome/ POC  MEDIUM Complexity : 3 Standardized tests and measures addressing body structure, function, activity limitation and / or participation in recreation  LOW Complexity : Stable, uncomplicated  LOW Complexity : FOTO score of       Based on the above components, the patient evaluation is determined to be of the following complexity level: LOW     Pain Rating:  Pain relieved at rest and with ice    Activity Tolerance:   Good and limited by pain  Please refer to the flowsheet for vital signs taken during this treatment. After treatment patient left in no apparent distress:   Sitting in chair, Call bell within reach and ice in place L hip    COMMUNICATION/EDUCATION:   The patients plan of care was discussed with: Registered Nurse and physician. Fall prevention education was provided and the patient/caregiver indicated understanding., Patient/family have participated as able in goal setting and plan of care. and Patient/family agree to work toward stated goals and plan of care.     Thank you for this referral.  yAlin Alvesch, PT   Time Calculation: 16 mins

## 2020-02-27 NOTE — ED PROVIDER NOTES
Pt w hx of right hip replacement developed sudden onset left hip pain that shoots down to her left knee while cooking dinner. Pain gradually worsened and is worse with movement and mildly improved with rest.  She was unable to ambulate without a walker. Denies fever, back pain, urinary retention, numbness, weakness. No previous injury but did have extensive follow-up CT scans for right sided lower abdominal pain earlier today. The history is provided by the patient. Leg Pain    This is a new problem. The current episode started 1 to 2 hours ago. The problem occurs constantly. The problem has not changed since onset. The pain is present in the left upper leg and left hip. The pain is severe. Pertinent negatives include no numbness, full range of motion and no back pain. The symptoms are aggravated by movement. She has tried nothing for the symptoms. There has been no history of extremity trauma.         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    Hypercholesterolemia 2001    Hypertension 2001    Non-seasonal allergic rhinitis 6-2011    dr Dano Snider    Osteoporosis 2006       Past Surgical History:   Procedure Laterality Date    COLONOSCOPY N/A 11/17/2017    COL[facility] performed by Анна Steele MD at [facility]. [address], [address], DIAGNOSTIC  2007    due 2017    HX GYN  1974    OVARIAN CYST REMOVED    HX ORTHOPAEDIC      R hip replacement    NATALIE BIOPSY BREAST STEREOTACTIC Right 2010    benign         Family History:   Problem Relation Age of Onset    Breast Cancer Maternal Aunt     Dementia Mother     Hypertension Mother     High Cholesterol Mother     Diabetes Mother     Cancer Father         COLON    Hypertension Sister     Diabetes Sister     Hypertension Brother     Hypertension Sister     High Cholesterol Sister     Hypertension Brother     Anesth Problems Neg Hx        Social History     Socioeconomic History    Marital status:      Spouse name: Not on file    Number of children: Not on file    Years of education: Not on file    Highest education level: Not on file   Occupational History    Not on file   Social Needs    Financial resource strain: Not on file    Food insecurity:     Worry: Not on file     Inability: Not on file    Transportation needs:     Medical: Not on file     Non-medical: Not on file   Tobacco Use    Smoking status: Current Every Day Smoker     Packs/day: 0.30     Years: 37.00     Pack years: 11.10     Types: Cigarettes    Smokeless tobacco: Never Used   Substance and Sexual Activity    Alcohol use: No    Drug use: No    Sexual activity: Yes     Partners: Male   Lifestyle    Physical activity:     Days per week: Not on file     Minutes per session: Not on file    Stress: Not on file   Relationships    Social connections:     Talks on phone: Not on file     Gets together: Not on file     Attends [employer] service: Not on file     Active member of club or organization: Not on file     Attends meetings of clubs or organizations: Not on file     Relationship status: Not on file    Intimate partner violence:     Fear of current or ex partner: Not on file     Emotionally abused: Not on file     Physically abused: Not on file     Forced sexual activity: Not on file   Other Topics Concern    Not on file   Social History Narrative    Not on file         ALLERGIES: Patient has no known allergies. Review of Systems   Constitutional: Negative for chills and fever. Respiratory: Negative for shortness of breath. Cardiovascular: Negative for chest pain. Gastrointestinal: Negative for abdominal pain, constipation, diarrhea and vomiting. Musculoskeletal: Positive for arthralgias and myalgias. Negative for back pain. Neurological: Negative for dizziness, light-headedness and numbness. All other systems reviewed and are negative.       Vitals: 02/26/20 2052   BP: 149/71   Pulse: 99   Resp: 20   Temp: 97.9 °F (36.6 °C)   SpO2: 100%   Weight: 66.1 kg (145 lb 11.6 oz)   Height: 4' 11\" (1.499 m)            Physical Exam  Vitals signs and nursing note reviewed. Constitutional:       Appearance: She is well-developed. HENT:      Head: Normocephalic and atraumatic. Eyes:      Pupils: Pupils are equal, round, and reactive to light. Neck:      Musculoskeletal: Normal range of motion and neck supple. Cardiovascular:      Rate and Rhythm: Normal rate and regular rhythm. Pulmonary:      Effort: Pulmonary effort is normal.      Breath sounds: Normal breath sounds. Abdominal:      General: There is no distension. Palpations: Abdomen is soft. Tenderness: There is no abdominal tenderness. There is no right CVA tenderness or left CVA tenderness. Musculoskeletal:         General: No swelling, tenderness or deformity. Cervical back: Normal.      Thoracic back: Normal.      Lumbar back: Normal.   Skin:     General: Skin is warm and dry. Capillary Refill: Capillary refill takes less than 2 seconds. Findings: No rash. Neurological:      General: No focal deficit present. Mental Status: She is alert and oriented to person, place, and time. Psychiatric:         Mood and Affect: Mood normal.         Behavior: Behavior normal.          MDM       Procedures      Patient is being admitted to the hospital.  The results of their tests and reasons for their admission have been discussed with them and/or available family. They convey agreement and understanding for the need to be admitted and for their admission diagnosis. Consultation will be made now with the inpatient physician for hospitalization. Sakina Durán Serve for Admission  11:59 PM    ED Room Number: SER08/08  Patient Name and age:  Adam Orourke 62 y.o.  female  Working Diagnosis:   1.  Left leg pain      Readmission: no  Isolation Requirements: no  Recommended Level of Care:  med/surg  Code Status:  Full Code  Department:[facility] - [phone]  Other:  Pain is better controlled but patient is unsteady on her feet

## 2020-02-27 NOTE — DISCHARGE SUMMARY
Discharge Summary       PATIENT ID: Breonna Santoro  MRN: [id number]   YOB: 1955    DATE OF ADMISSION: 2/26/2020  8:49 PM    DATE OF DISCHARGE: 02/27/2020   PRIMARY CARE PROVIDER: Saloni Martinez MD     DISCHARGING PROVIDER: Berna Hurley MD    To contact this individual call [phone] and ask the  to page. If unavailable ask to be transferred the Adult Hospitalist Department. CONSULTATIONS: IP CONSULT TO ORTHOPEDIC SURGERY    PROCEDURES/SURGERIES: * No surgery found *    ADMITTING DIAGNOSES & HOSPITAL COURSE:   L hip pain secondary to avascular necrosis of femoral head and degenerative tearing of the Labrum       Breonna Santoro is a 62 y.o. female with history of hypertension, OA presents to the emergency room complaining of acute onset left lower extremity pain. Patient said that for several weeks she has been dealing with lower back pain radiating to the anterior abdomen and it presents like a heavy pressure. She was in the emergency room back in January 2020 for the same issues. At that time she had a CAT scan done that was unremarkable but she was found to have a lesion on the left kidney. She was referred to an OB/GYN who did evaluation as as per her report it was unremarkable. Eventually she was sent to a urologist who ordered a another CAT scan. She had a CAT scan done this morning (results are not available) but patient states that when she was done with a CAT scan she start experiencing worsening pain and now radiating to her left lower extremity down to her knee. Patient also reports heaviness of the left leg and inability to walk due to the pain and heaviness. She reports the pain as a severe sharp shooting pain. She was evaluated by orthopedics, and MRI of the hip and back were ordered. MRI of the back revealed chronic OA, and unchanged minimal anterolithesis of L5 on S1 with severe facet arthropathy. No significant spinal stenosis.  MRI of the hip revealed avascular necrosis of the left femoral head, as well as mild to moderate left hip OA with small foci of degenerative edema in the left acetabulum. She also had some degenerative tearing of the anterior superior left hip labrum. These are all chronic in nature, and are a bit unusual given her acuity of symptoms. Orthopedics has evaluated the patient and recommends outpatient follow-up with likely hip replacement in the near future. Physical therapy worked with the patient and she is stable for home with a walker. Prescribed a short course of oral oxycodone if needed for pain. DISCHARGE DIAGNOSES / PLAN:      Left hip pain- secondary likely to avascular necrosis, OA, labrum tear (chronic and degenerative)  -Orthopedics evaluated, follow-up with Dr. Jessica Jack  -Oral oxycodone as needed for pain  -Instructed patient to take stool softeners if needing to take pain medication regularly or if having any constipation  -Although she does have a leukocytosis, there is no concern for a septic hip. No other signs of infection. Leukocytosis- reactive? No signs of acute infection  Hypertension-continue home medications     ADDITIONAL CARE RECOMMENDATIONS:   - Please take all medications as prescribed. Note changes as below. **Add oxycodone as needed for pain, if this affects you to strongly may also try her home tramadol. I would not take both together.  - Please make sure to follow up with your primary care physician within 1-2 weeks of discharge for hospital follow up. You also need to follow-up with Dr. Jessica Jack, for ongoing hip pain. - Please get up slowly from a seated or laying position, avoid falls. - Avoid tobacco, alcohol and other illicit drug use.           PENDING TEST RESULTS:   At the time of discharge the following test results are still pending: None    FOLLOW UP APPOINTMENTS:    Follow-up Information     Follow up With Specialties Details Why Venessa Claros MD [facility]   [address] [facility] [address] [place] [address] Marizol Geronimo Ross Wicho  [phone]      Maria Elena Anglin MD Orthopedic Surgery In 1 week Call to schedule an office follow up appoitnmyahir Bashir [facility] [address] [address] y 72 [phone]                DIET: Resume previous diet    ACTIVITY: Activity as tolerated    WOUND CARE: None    EQUIPMENT needed: Johnette Severe, has one at home. DISCHARGE MEDICATIONS:  Current Discharge Medication List      START taking these medications    Details   oxyCODONE IR (ROXICODONE) 5 mg immediate release tablet Take 0.5 Tabs by mouth every six (6) hours as needed for Pain for up to 3 days. Max Daily Amount: 10 mg.  Qty: 10 Tab, Refills: 0    Associated Diagnoses: Left leg pain               NOTIFY YOUR PHYSICIAN FOR ANY OF THE FOLLOWING:   Fever over 101 degrees for 24 hours. Chest pain, shortness of breath, fever, chills, nausea, vomiting, diarrhea, change in mentation, falling, weakness, bleeding. Severe pain or pain not relieved by medications. Or, any other signs or symptoms that you may have questions about.     DISPOSITION:   X Home With:   OT  PT  HH  RN       Long term SNF/Inpatient Rehab    Independent/assisted living    Hospice    Other:       PATIENT CONDITION AT DISCHARGE:     Functional status    Poor     Deconditioned    X Independent      Cognition    X Lucid     Forgetful     Dementia      Catheters/lines (plus indication)    Villarreal     PICC     PEG    X None      Code status   X  Full code     DNR      PHYSICAL EXAMINATION AT DISCHARGE:  Visit Vitals  /67 (BP 1 Location: Left arm, BP Patient Position: At rest)   Pulse 97   Temp 98.1 °F (36.7 °C)   Resp 16   Ht 4' 11\" (1.499 m)   Wt 66.1 kg (145 lb 11.6 oz)   SpO2 98%   Breastfeeding No   BMI 29.43 kg/m²     Gen: NAD, awake in bed  HEENT: NC/AT, sclera anicteric, PERRL, EOMI  CV: RRR no m/r/g  Resp: CTA b/l no increased work of breathing  Abd: NT/ND, normal bowel sounds  Ext: 2+ pulses, no edema, TTP over the L hip Skin: No rashes        CHRONIC MEDICAL DIAGNOSES:  Problem List as of 2/27/2020 Date Reviewed: 4/11/2013          Codes Class Noted - Resolved    Leg pain ICD-10-CM: M79.606  ICD-9-CM: 729.5  2/27/2020 - Present        Primary osteoarthritis of right hip ICD-10-CM: M16.11  ICD-9-CM: 715.15  3/15/2017 - Present        Hypercholesterolemia ICD-10-CM: E78.00  ICD-9-CM: 272.0  Unknown - Present        Hypertension ICD-10-CM: I10  ICD-9-CM: 401.9  Unknown - Present        Cyst ICD-9-CM: [id number]  Unknown - Present    Overview Signed 1/4/2012 12:30 PM by Mary Francisco LPN     [facility]             Osteoporosis ICD-10-CM: M81.0  ICD-9-CM: 733.00  8/8/2011 - Present        Allergic rhinitis, cause unspecified ICD-10-CM: J30.9  ICD-9-CM: 477.9  8/8/2011 - Present        Allergic asthma ICD-10-CM: J45.909  ICD-9-CM: 493.00  8/8/2011 - Present        Other and unspecified hyperlipidemia ICD-10-CM: E78.5  ICD-9-CM: 272.4  8/8/2011 - Present        Essential hypertension, benign ICD-10-CM: I10  ICD-9-CM: 401.1  8/8/2011 - Present        Non-seasonal allergic rhinitis ICD-10-CM: J30.89  ICD-9-CM: 477.8  6/1/2011 - Present    Overview Signed 1/4/2012 12:30 PM by DELLA Hernandez dr Maksim than 30 minutes were spent with the patient on counseling and coordination of care    Signed:   Thelma Yen MD  2/27/2020  2:16 PM

## 2020-02-27 NOTE — ED NOTES
Attempted to ambulated patient with assistance of RN and tech. Patient was unable to get out of the bed with assistance. Dr. Jakub Bañuelos notified.

## 2020-02-27 NOTE — ED NOTES
Nursing supervisor called to check  pt's bed status and said she was working on a bed asighnment . Pt concerned and pt's family upset due to delay in transfer.

## 2020-02-27 NOTE — ROUTINE PROCESS
Bedside shift change report given to Claudette Goldsmith ,RN (oncoming nurse) by Home Alfonso RN(offgoing nurse). Report given with SBAR.

## 2020-02-27 NOTE — ED NOTES
Called nursing supervisor to check availability of medical surgical beds. Supervisor advises patient will not be an ED hold and will be assigned a room.

## 2020-02-27 NOTE — ED NOTES
Transport to [facility]. room 567 arranged with tanya. e.t.a. 1hr 30 min. Will alternate transport companies for faster e.t.a..

## 2020-02-27 NOTE — ED NOTES
Patient and family are upset no bed has been assigned. Dr. Harshad Win called nursing supervisor in reference to bed placement. Reassured a bed will be available for patient. Patient and family updated.

## 2020-02-27 NOTE — ED NOTES
TRANSFER - OUT REPORT:    Verbal report given to Ashlee Seals RN on Kristyn Stephens  being transferred to  [phone] (unit) for routine progression of care       Report consisted of patients Situation, Background, Assessment and   Recommendations(SBAR). Information from the following report(s) ED Summary was reviewed with the receiving nurse. Lines:   Peripheral IV 02/26/20 Right Antecubital (Active)        Opportunity for questions and clarification was provided.       Patient transported with:  Ambulance Transport

## 2020-02-27 NOTE — PROGRESS NOTES
Primary Nurse Diana Holbrook RN and Marilu Ontiveros RN performed a dual skin assessment on this patient No impairment noted  Mainor score is 17

## 2020-02-27 NOTE — H&P
[address] [facility] Adult  Hospitalist Group  History and Physical    Primary Care Provider: Jaiden Khanna MD  Date of Service:  2/27/2020    Subjective:     Ratna Ugarte is a 62 y.o. female with history of hypertension, OA presents to the emergency room complaining of acute onset left lower extremity pain. Patient said that for several weeks she has been dealing with lower back pain radiating to the anterior abdomen and it presents like a heavy pressure. She was in the emergency room back in January 2020 for the same issues. At that time she had a CAT scan done that was unremarkable but she was found to have a lesion on the left kidney. She was referred to an OB/GYN who did evaluation as as per her report it was unremarkable. Eventually she was sent to a urologist who ordered a another CAT scan. She had a CAT scan done this morning (results are not available) but patient states that when she was done with a CAT scan she start experiencing worsening pain and now radiating to her left lower extremity down to her knee. Patient also reports heaviness of the left leg and inability to walk due to the pain and heaviness. She reports the pain as a severe sharp shooting pain. She also reports back pain on the left side exacerbated by range of motion. She denies any urinary symptoms, decreased sensation of the lower extremity. No recent trauma. In the emergency room today she was given a dose of prednisone orally, oral pain medication and Toradol without significant improvement in his symptoms for which admission was requested. Review of Systems:    General: HPI, no fever, no changes of weight or sleep  HEENT: no headache, no vision changes, no nose discharge, no hearing changes   RES: no wheezing, no cough, no sob  CVS: no cp, no palpitation. Muscular: Patient reports left hip pain, left lower extremity heaviness and shooting pain down to her knee. No swelling of the leg.   Patient reports lower back pain  Skin: no rash, no itching   GI: no vomiting, no diarrhea  : no dysuria, no hematuria  Hemo: no gum bleeding, no petechial   Neuro: Heaviness of the left lower extremity. Endo: no polydipsia   Psych: denied depression     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    Hypercholesterolemia 2001    Hypertension 2001    Non-seasonal allergic rhinitis 6-2011    dr Galileo Winslow Osteoporosis 2006      Past Surgical History:   Procedure Laterality Date    COLONOSCOPY N/A 11/17/2017    COLON[facility] by [facility]ae [facility] MD at [facility], DIAGNOSTIC  2007    due 2017    HX GYN  1974    OVARIAN CYST REMOVED    HX ORTHOPAEDIC      R hip replacement    NATALIE BIOPSY BREAST STEREOTACTIC Right 2010    benign     Prior to Admission medications    Medication Sig Start Date End Date Taking? Authorizing Provider   dicyclomine (BENTYL) 20 mg tablet Take 1 Tab by mouth every six (6) hours. 4/9/18  Yes Kaitlin Mendiola MD   ASPIRIN PO Take 81 mg by mouth daily. Yes Provider, Historical   cholecalciferol (VITAMIN D3) 1,000 unit cap Take 1,000 Units by mouth daily. Yes Provider, Historical   Hydrochlorothiazide (HYDRODIURIL) 12.5 mg tablet Take 1 tablet by mouth daily. Needs to schedule ov before further refills. Indications: HYPERTENSION 9/18/14  Yes Alissa Muhammad NP   irbesartan (AVAPRO) 300 mg tablet Take 1 Tab by mouth daily. 1/28/14  Yes Alissa Muhmamad NP   predniSONE (DELTASONE) 10 mg tablet Take 4 tabs (40mg) daily for 5 days, then take 3 tabs (30mg) daily for 3 days, then take 2 tabs (20mg) for 2 days, then STOP 6/2/18   Chepe Brown NP   omeprazole (PRILOSEC) 40 mg capsule Take 1 Cap by mouth daily. 4/9/18   Kaitlin Mendiola MD   doxycycline (VIBRAMYCIN) 50 mg capsule Take 50 mg by mouth daily.     Provider, Historical   LACTOBACILLUS ACIDOPHILUS (PROBIOTIC PO) Take 1 Cap by mouth daily. Provider, Historical   atorvastatin (LIPITOR) 40 mg tablet Take 40 mg by mouth daily. Provider, Historical   montelukast (SINGULAIR) 10 mg tablet Take 10 mg by mouth daily. Provider, Historical   albuterol (PROVENTIL HFA, VENTOLIN HFA, PROAIR HFA) 90 mcg/actuation inhaler Take 2 puffs by inhalation every six (6) hours as needed for Wheezing. 2/5/15   Tonya Yap,      No Known Allergies   Family History   Problem Relation Age of Onset    Breast Cancer Maternal Aunt     Dementia Mother     Hypertension Mother     High Cholesterol Mother     Diabetes Mother     Cancer Father         COLON    Hypertension Sister     Diabetes Sister     Hypertension Brother     Hypertension Sister     High Cholesterol Sister     Hypertension Brother     Anesth Problems Neg Hx         SOCIAL HISTORY:  Patient resides at home  Patient ambulates independently  Smoking history: never  Alcohol history: none      Objective:       Physical Exam:   Visit Vitals  /71   Pulse 99   Temp 97.7 °F (36.5 °C)   Resp 16   Ht 4' 11\" (1.499 m)   Wt 66.1 kg (145 lb 11.6 oz)   SpO2 96%   Breastfeeding No   BMI 29.43 kg/m²     GEN APPEARANCE: Patient in pain  HEENT: Conjunctiva Clear  CVS: RRR, S1, S2; No M/G/R  LUNGS: CTAB; No Wheezes; No Rhonchi: No rales  ABD: Soft; No TTP; + Normoactive BS  EXT:  no edema   Skin exam: No gross lesions noted on exposed skin surfaces  MENTAL STATUS: Answers questions appropriately, responds to commands. Back: TTP along the left lumbar area. Pain with ROM. Straight leg test positive on the left. Left hip: TTP anterior aspect of the hip. Pain with active and passive ROM. No palpable fluid or abscess noted. Neuro: intact sensation of the LE bilaterally.      Data Review:   Recent Results (from the past 24 hour(s))   CBC W/O DIFF    Collection Time: 02/26/20  9:12 PM   Result Value Ref Range    WBC 18.8 (H) 3.6 - 11.0 K/uL    RBC 3.75 (L) 3.80 - 5.20 M/uL    HGB 12.0 11.5 - 16.0 g/dL    HCT 35.5 35.0 - 47.0 %    MCV 94.7 80.0 - 99.0 FL    MCH 32.0 26.0 - 34.0 PG    MCHC 33.8 30.0 - 36.5 g/dL    RDW 13.2 11.5 - 14.5 %    PLATELET 217 047 - 381 K/uL    MPV 10.1 8.9 - 12.9 FL    NRBC 0.0 0  WBC    ABSOLUTE NRBC 0.00 0.00 - 8.12 K/uL   METABOLIC PANEL, BASIC    Collection Time: 02/26/20  9:12 PM   Result Value Ref Range    Sodium 139 136 - 145 mmol/L    Potassium 3.3 (L) 3.5 - 5.1 mmol/L    Chloride 100 97 - 108 mmol/L    CO2 23 21 - 32 mmol/L    Anion gap 16 (H) 5 - 15 mmol/L    Glucose 107 (H) 65 - 100 mg/dL    BUN 10 6 - 20 MG/DL    Creatinine 0.71 0.55 - 1.02 MG/DL    BUN/Creatinine ratio 14 12 - 20      GFR est AA >60 >60 ml/min/1.73m2    GFR est non-AA >60 >60 ml/min/1.73m2    Calcium 8.9 8.5 - 10.1 MG/DL   SAMPLES BEING HELD    Collection Time: 02/26/20  9:13 PM   Result Value Ref Range    SAMPLES BEING HELD 1LAV,1PST,1BLU,1RED     COMMENT        Add-on orders for these samples will be processed based on acceptable specimen integrity and analyte stability, which may vary by analyte. URINALYSIS W/MICROSCOPIC    Collection Time: 02/26/20 10:15 PM   Result Value Ref Range    Color YELLOW/STRAW      Appearance CLEAR CLEAR      Specific gravity 1.015 1.003 - 1.030      pH (UA) 6.0 5.0 - 8.0      Protein NEGATIVE  NEG mg/dL    Glucose NEGATIVE  NEG mg/dL    Ketone TRACE (A) NEG mg/dL    Bilirubin NEGATIVE  NEG      Blood NEGATIVE  NEG      Urobilinogen 0.2 0.2 - 1.0 EU/dL    Nitrites NEGATIVE  NEG      Leukocyte Esterase NEGATIVE  NEG      WBC 0-4 0 - 4 /hpf    RBC 0-5 0 - 5 /hpf    Epithelial cells MODERATE (A) FEW /lpf    Bacteria 1+ (A) NEG /hpf   URINE CULTURE HOLD SAMPLE    Collection Time: 02/26/20 10:15 PM   Result Value Ref Range    Urine culture hold        Urine on hold in Microbiology dept for 2 days. If unpreserved urine is submitted, it cannot be used for addtional testing after 24 hours, recollection will be required.        Assessment:     Active Problems:    Leg pain (2/27/2020)        Plan:     1. Left lower extremity pain-associated with lower back pain and left groin pain. X-ray of the hip did not show any pathology but patient with significant pain in the same area with passive range of motion. However pain mainly associated with radiating back pain to the left lower extremity will obtain MRI of the back.  -Pain control with Dilaudid 1 mg IV every 4 hours. .    2. Leukocytosis-no clear source of infection however given her back pain and hip pain concern for joint infection.  -We will check for MRI of the back  -Check ESR and CRP. 3. HTN- bp stable. Home medication needs to be reconciled. Will monitor BP for now and try hydralazine 10 mg every 6 hours PRN for SBP greater than 170. DVT prophy: lovenox    FUNCTIONAL STATUS PRIOR TO HOSPITALIZATION: independent.      Signed By: Jhon Brown MD     February 27, 2020

## 2020-02-27 NOTE — ED NOTES
Rodolfo martínez contacted concerning 1hr 30 min e.t.a. and claim they should be able to get a b.l.s. crew to anuj with in the hr.

## 2020-02-27 NOTE — PROGRESS NOTES
I have reviewed discharge instructions with the patient. The patient verbalized understanding. Patient was taken in a wheelchair to Patient Discharge along with discharge instructions, prescription script, and patient's belongings.

## 2020-02-27 NOTE — ED TRIAGE NOTES
Patient arrived via EMS from home reporting left upper leg pain. Patient reports sudden onset at about two hours ago. Patient reports she is unable to walk. Patient reports the pain is from her lower back and waist to her knee. Patient had CT scans ordered by urology today.

## 2020-02-28 ENCOUNTER — PATIENT OUTREACH (OUTPATIENT)
Dept: OTHER | Age: 65
End: 2020-02-28

## 2020-02-28 NOTE — PROGRESS NOTES
Care Manager contacted the patient by telephone in follow up. Verified  and zip code with patient as identifiers. Patient was seen in ER on 20 for worsening pain in back now with radiation into left groin and left lower extremity to knee. Patient was having a CT scan to work up left kidney lesion on the same day and reports pain increased after CT scan. Due to uncontrolled pain the patient was admitted for overnight observation. MRI completed in hospital with impression:  1. Avascular necrosis in the superior left femoral head without evidence of  subchondral collapse. 2. Mild to moderate left hip osteoarthritis with small foci of degenerative  edema in the left acetabulum. 3. Moderate left hip joint effusion. 4. Degenerative tearing of the anterior superior left hip labrum. 5. Status post right total hip arthroplasty. Patient to follow up with orthopedic surgeon to discuss CURTIS on 3/6/20 and has follow up with Urologist on 3/4/20 to review results of CT scan regarding kidney lesion. Patient states today her pain level is 7/10 with acceptable pain control with oxycodone. Denies any Red Flag Symptoms or new symptoms or concerns.      Assessment of clinical changes and knowledge demonstrated since last call:   Ongoing Plan of Care:   Pain Management postop   Goal:  Demonstrates pain control with current medication regimen within 14 days  20 Goal updated to reflect new dx of avascular necrosis and labrum tear left hip,   · States pain 7/10;  · States using Oxycodone with acceptable control;     Goal:   Demonstrates no signs of complications or red flags in 30 days;  · Review and discuss importance of monitoring and reporting red flags;  · Review medications and when taken with patient to ensure understanding;  · Educate patient when to call the physician or 911;  · Assess for any barriers with care access   · 20 No barriers identified   ·    Potential Learning Need  Goal:  Completes all follow-up appointments within 30 days of ED visit;  2/28/20Goal Updated due to new ER visit on 2/26/20  · Educate and encourage importance of FU for prevention of complications or disease progression;  · Assess the patients relationship with a PCP and next FU visit scheduled;  · States was seen 1/27/20 by GYN-normal exam  · Referred to Urologist 1/29/20  · Work up in progress by Urologist  · CT scan to evaluate kidney lesion on 2/26/20  · Follow up visit with urologist on 3/4/20  · States plans to schedule PCP follow up   · Has follow up with orthopedic surgeon on 3/6/20  · Discuss importance of adherence to treatment plan and follow up visits;  · Identify any barriers in transportation or access to FU appointments.   · Assist patient with making FU appointments as needed;   · Patient declined assistance  ? Develop list of questions, concerns before visit. ? Importance of knowing your numbers, test results. ? Keep a list of the medicines you take.        Review and discussion of plan of care with patient, who has provided input to plan, verbalized understanding and agrees with current goals. Any recurrence Red Flags or continued symptoms: See above     Medication Regimen Change:  Completed a review of medications with patient, who verbalized understanding of how and when to take medications. Barriers / Adherence with medications:None    Upcoming Appointments: Urologist 3/4/20 and Orthopedic surgeon 3/6/20    Patient asked questions appropriately and denied any additional needs at this time. Patient verbalized understanding of all information discussed. Patient has my name and contact information for any follow up needs or questions.      Plan next call:  7 to 10 days

## 2020-03-09 ENCOUNTER — PATIENT OUTREACH (OUTPATIENT)
Dept: OTHER | Age: 65
End: 2020-03-09

## 2020-03-09 NOTE — PROGRESS NOTES
Care Manager contacted the patient by telephone in follow up. Verified  and zip code with patient as identifiers. Spoke with patient, who reports she is doing ok, pain level controlled with pain medication. Reports the hip surgery has been postponed due to was seen by urologist for follow up of kidney mass and he has ordered an MRI and feels she may need surgery. MRI is scheduled for 3/12/20 and she will see the urologist on 3/25/20 for follow up. Once a decision is made about the mass, she will reschedule follow up with orthopedic surgeon for CURTIS. Patient denies any Red Flag Symptoms or other problems, just very concerned about the results of the kidney MRI. She has support from family and friends. Denies any new questions or needs from CM. Will plan follow up after Urology follow up.         Assessment of clinical changes and knowledge demonstrated since last call:   Ongoing Plan of Care:   Goal:  Demonstrates pain control with current medication regimen within 14 days  20 Goal updated to reflect new dx of avascular necrosis and labrum tear left hip,   · States pain 2-4/10;  · States using Oxycodone with acceptable control;     Goal:   Demonstrates no signs of complications or red flags in 30 days;  · Review and discuss importance of monitoring and reporting red flags;  · Review medications and when taken with patient to ensure understanding;  · Educate patient when to call the physician or 911;  · Assess for any barriers with care access   · 20 No barriers identified   ·    Potential Learning Need  Goal:  Completes all follow-up appointments within 30 days of ED visit;  20Goal Updated due to new ER visit on 20  · Educate and encourage importance of FU for prevention of complications or disease progression;  · Assess the patients relationship with a PCP and next FU visit scheduled;  · States was seen 20 by GYN-normal exam  · Referred to Urologist 20  · Work up in progress by Urologist  · MRI ordered to evaluate kidney lesion on 3/12/20  · Follow up visit with urologist on 3/25/20  · States plans to schedule PCP follow up   · CURTIS on hold pending further work up of kidney mass  · Patient to schedule ortho follow up once cleared by uroogist  · Discuss importance of adherence to treatment plan and follow up visits;  · Identify any barriers in transportation or access to FU appointments.   · Assist patient with making FU appointments as needed;   · Patient declined assistance  ? Develop list of questions, concerns before visit. ? Importance of knowing your numbers, test results. ? Keep a list of the medicines you take.       Review and discussion of plan of care with patient, who has provided input to plan, verbalized understanding and agrees with current goals. Any recurrence Red Flags or continued symptoms:None     Medication Regimen Change:None  Completed a review of medications with patient, who verbalized understanding of how and when to take medications. Barriers / Adherence with medications:    Upcoming Appointments: MRI 3/12/20, Urologist 3/25/20    Patient asked questions appropriately and denied any additional needs at this time. Patient verbalized understanding of all information discussed. Patient has my name and contact information for any follow up needs or questions. Follow Up: After urology visit

## 2020-03-26 ENCOUNTER — PATIENT OUTREACH (OUTPATIENT)
Dept: OTHER | Age: 65
End: 2020-03-26

## 2020-03-26 NOTE — PROGRESS NOTES
HPRP Follow Up. Telephone attempt to contact patient for HPRP Follow up. Left discreet message on voicemail with this CC contact information. Will plan follow up in 7 to 10 days.

## 2020-04-02 ENCOUNTER — PATIENT OUTREACH (OUTPATIENT)
Dept: OTHER | Age: 65
End: 2020-04-02

## 2020-04-02 NOTE — LETTER
Ms. Bolden Single 
[address] [place] [address] 
[facility] [id number] Dear Ms. Donna Mazariegos My name is Yaneth Bynum LPN, Employee Care Manager for [employer]. I am trying to reach you for a follow up call but I keep missing you. We last spoke on 3/9/20. As part of the [address] [place] [address]) program, I appreciate the opportunity to work with you! There is nothing more important than one's health. My role is to come alongside you with support and resources to optimize your health. As a Care Manager, I provide guidance on options available to you and ensure you receive quality healthcare to meet your needs. The [facility] team of RNs, LPNs and a  provide care coordination, transitions of care, disease management, self-management education, and other resources available to assist you in reaching your healthcare goals. Please contact me at the below number if I can provide you with further assistance or resources. Sincerely, Yaneth Bynum LPN  [facility] Care Coordinator [address] [place] [address], [address] [place] [address] [address] [facility] Office Cell [phone] Fax [email or web address] Miki STILL [email or web address]

## 2020-04-02 NOTE — PROGRESS NOTES
HPRP f/u:  Second telephone attempt to contact patient for Health Promotion and Risk Prevention. Left discreet message on both home and cell voicemail with this CC contact information. Will follow for one month for transitions of care needs. Letter sent.     Next outreach is 2 weeks for discussion f/u and Resolve Episode

## 2020-04-16 ENCOUNTER — PATIENT OUTREACH (OUTPATIENT)
Dept: OTHER | Age: 65
End: 2020-04-16

## 2020-04-16 NOTE — PROGRESS NOTES
Resolving current episode for case management due to patient lost to follow up. Patient has not been reached after repeated calls and letters. Final call made today to attempt to contact and discreet message left on voicemail. Letter previously sent to patient notifying completion of services due to unable to reach. This writer's contact information and information regarding program services included in materials sent. Goals updated to reflect current status as appropriate. Will remain available should patient request re-initiation of case management or transitions of care services. No new notes in EMR. Closing episode.

## 2020-06-19 ENCOUNTER — HOSPITAL ENCOUNTER (OUTPATIENT)
Dept: PREADMISSION TESTING | Age: 65
Discharge: HOME | End: 2020-06-19
Payer: COMMERCIAL

## 2020-06-19 VITALS
HEIGHT: 59 IN | SYSTOLIC BLOOD PRESSURE: 124 MMHG | BODY MASS INDEX: 26.21 KG/M2 | HEART RATE: 86 BPM | WEIGHT: 130 LBS | TEMPERATURE: 98.3 F | DIASTOLIC BLOOD PRESSURE: 73 MMHG

## 2020-06-19 LAB
ABO + RH BLD: NORMAL
ANION GAP SERPL CALC-SCNC: 9 MMOL/L (ref 5–15)
APPEARANCE UR: CLEAR
ATRIAL RATE: 84 BPM
BACTERIA URNS QL MICRO: NEGATIVE /HPF
BILIRUB UR QL: NEGATIVE
BLOOD GROUP ANTIBODIES SERPL: NORMAL
BUN SERPL-MCNC: 9 MG/DL (ref 6–20)
BUN/CREAT SERPL: 16 (ref 12–20)
CALCIUM SERPL-MCNC: 9.8 MG/DL (ref 8.5–10.1)
CALCULATED P AXIS, ECG09: 63 DEGREES
CALCULATED R AXIS, ECG10: 16 DEGREES
CALCULATED T AXIS, ECG11: 45 DEGREES
CHLORIDE SERPL-SCNC: 105 MMOL/L (ref 97–108)
CO2 SERPL-SCNC: 26 MMOL/L (ref 21–32)
COLOR UR: NORMAL
CREAT SERPL-MCNC: 0.56 MG/DL (ref 0.55–1.02)
DIAGNOSIS, 93000: NORMAL
EPITH CASTS URNS QL MICRO: NORMAL /LPF
ERYTHROCYTE [DISTWIDTH] IN BLOOD BY AUTOMATED COUNT: 13.4 % (ref 11.5–14.5)
EST. AVERAGE GLUCOSE BLD GHB EST-MCNC: 120 MG/DL
GLUCOSE SERPL-MCNC: 97 MG/DL (ref 65–100)
GLUCOSE UR STRIP.AUTO-MCNC: NEGATIVE MG/DL
HBA1C MFR BLD: 5.8 % (ref 4–5.6)
HCT VFR BLD AUTO: 36.9 % (ref 35–47)
HGB BLD-MCNC: 12.3 G/DL (ref 11.5–16)
HGB UR QL STRIP: NEGATIVE
HYALINE CASTS URNS QL MICRO: NORMAL /LPF (ref 0–5)
INR PPP: 0.9 (ref 0.9–1.1)
KETONES UR QL STRIP.AUTO: NEGATIVE MG/DL
LEUKOCYTE ESTERASE UR QL STRIP.AUTO: NEGATIVE
MCH RBC QN AUTO: 31.5 PG (ref 26–34)
MCHC RBC AUTO-ENTMCNC: 33.3 G/DL (ref 30–36.5)
MCV RBC AUTO: 94.4 FL (ref 80–99)
NITRITE UR QL STRIP.AUTO: NEGATIVE
NRBC # BLD: 0 K/UL (ref 0–0.01)
NRBC BLD-RTO: 0 PER 100 WBC
P-R INTERVAL, ECG05: 136 MS
PH UR STRIP: 7 [PH] (ref 5–8)
PLATELET # BLD AUTO: 245 K/UL (ref 150–400)
PMV BLD AUTO: 9.7 FL (ref 8.9–12.9)
POTASSIUM SERPL-SCNC: 3.9 MMOL/L (ref 3.5–5.1)
PROT UR STRIP-MCNC: NEGATIVE MG/DL
PROTHROMBIN TIME: 9.8 SEC (ref 9–11.1)
Q-T INTERVAL, ECG07: 366 MS
QRS DURATION, ECG06: 70 MS
QTC CALCULATION (BEZET), ECG08: 432 MS
RBC # BLD AUTO: 3.91 M/UL (ref 3.8–5.2)
RBC #/AREA URNS HPF: NORMAL /HPF (ref 0–5)
SODIUM SERPL-SCNC: 140 MMOL/L (ref 136–145)
SP GR UR REFRACTOMETRY: 1.01 (ref 1–1.03)
SPECIMEN EXP DATE BLD: NORMAL
UA: UC IF INDICATED,UAUC: NORMAL
UROBILINOGEN UR QL STRIP.AUTO: 0.2 EU/DL (ref 0.2–1)
VENTRICULAR RATE, ECG03: 84 BPM
WBC # BLD AUTO: 8.6 K/UL (ref 3.6–11)
WBC URNS QL MICRO: NORMAL /HPF (ref 0–4)

## 2020-06-19 PROCEDURE — 85610 PROTHROMBIN TIME: CPT

## 2020-06-19 PROCEDURE — 83036 HEMOGLOBIN GLYCOSYLATED A1C: CPT

## 2020-06-19 PROCEDURE — 85027 COMPLETE CBC AUTOMATED: CPT

## 2020-06-19 PROCEDURE — 80048 BASIC METABOLIC PNL TOTAL CA: CPT

## 2020-06-19 PROCEDURE — 86900 BLOOD TYPING SEROLOGIC ABO: CPT

## 2020-06-19 PROCEDURE — 81001 URINALYSIS AUTO W/SCOPE: CPT

## 2020-06-19 PROCEDURE — 93005 ELECTROCARDIOGRAM TRACING: CPT

## 2020-06-19 PROCEDURE — 36415 COLL VENOUS BLD VENIPUNCTURE: CPT

## 2020-06-19 RX ORDER — MONTELUKAST SODIUM 10 MG/1
10 TABLET ORAL DAILY
COMMUNITY
End: 2021-08-09 | Stop reason: SDUPTHER

## 2020-06-19 NOTE — PERIOP NOTES
Preoperative instructions reviewed with patient. Patient given two bottles of CHG soap. Instructions(reviewed/to be reviewed in class) on use of CHG soap. Patient given SSI infection FAQS sheet, as well as a  MRSA/MSSA treatment instruction sheet  With an explanation to patient that they will be notified if treatment instructions need to be initiated. Patient was given the opportunity to ask questions on the information provided. PATIENT MADE AWARE OF NEED FOR COVID-19 TESTING WITHIN 96 HOURS OF SURGERY. PATIENT INSTRUCTED TO EXPECT A CALL TO SCHEDULE APPT FOR TEST. PATIENT INSTRUCTED TO SELF QUARANTINE BETWEEN TESTING AND ARRIVAL TIME DAY OF SURGERY.

## 2020-06-20 LAB
BACTERIA SPEC CULT: NORMAL
BACTERIA SPEC CULT: NORMAL
SERVICE CMNT-IMP: NORMAL

## 2020-07-04 ENCOUNTER — HOSPITAL ENCOUNTER (OUTPATIENT)
Dept: PREADMISSION TESTING | Age: 65
Discharge: HOME | End: 2020-07-04
Payer: COMMERCIAL

## 2020-07-04 DIAGNOSIS — Z20.822 ENCOUNTER FOR LABORATORY TESTING FOR COVID-19 VIRUS: ICD-10-CM

## 2020-07-04 PROCEDURE — 87635 SARS-COV-2 COVID-19 AMP PRB: CPT

## 2020-07-05 LAB — SARS-COV-2, COV2NT: NOT DETECTED

## 2020-07-07 ENCOUNTER — ANESTHESIA EVENT (OUTPATIENT)
Dept: SURGERY | Age: 65
End: 2020-07-07
Payer: COMMERCIAL

## 2020-07-08 ENCOUNTER — APPOINTMENT (OUTPATIENT)
Dept: GENERAL RADIOLOGY | Age: 65
End: 2020-07-08
Attending: ORTHOPAEDIC SURGERY
Payer: COMMERCIAL

## 2020-07-08 ENCOUNTER — HOSPITAL ENCOUNTER (OUTPATIENT)
Age: 65
Setting detail: OBSERVATION
Discharge: HOME | End: 2020-07-09
Attending: ORTHOPAEDIC SURGERY | Admitting: ORTHOPAEDIC SURGERY
Payer: COMMERCIAL

## 2020-07-08 ENCOUNTER — ANESTHESIA (OUTPATIENT)
Dept: SURGERY | Age: 65
End: 2020-07-08
Payer: COMMERCIAL

## 2020-07-08 DIAGNOSIS — M16.12 PRIMARY OSTEOARTHRITIS OF LEFT HIP: Primary | ICD-10-CM

## 2020-07-08 LAB
ABO + RH BLD: NORMAL
BLOOD GROUP ANTIBODIES SERPL: NORMAL
GLUCOSE BLD STRIP.AUTO-MCNC: 110 MG/DL (ref 65–100)
SERVICE CMNT-IMP: ABNORMAL
SPECIMEN EXP DATE BLD: NORMAL

## 2020-07-08 PROCEDURE — 77030010507 HC ADH SKN DERMBND J&J -B: Performed by: ORTHOPAEDIC SURGERY

## 2020-07-08 PROCEDURE — 74011250636 HC RX REV CODE- 250/636: Performed by: NURSE ANESTHETIST, CERTIFIED REGISTERED

## 2020-07-08 PROCEDURE — 77030020788: Performed by: ORTHOPAEDIC SURGERY

## 2020-07-08 PROCEDURE — 77030006802 HC BLD SAW RECIP BRSM -B: Performed by: ORTHOPAEDIC SURGERY

## 2020-07-08 PROCEDURE — 74011250637 HC RX REV CODE- 250/637: Performed by: ORTHOPAEDIC SURGERY

## 2020-07-08 PROCEDURE — 74011250636 HC RX REV CODE- 250/636: Performed by: PHYSICIAN ASSISTANT

## 2020-07-08 PROCEDURE — 36415 COLL VENOUS BLD VENIPUNCTURE: CPT

## 2020-07-08 PROCEDURE — 97116 GAIT TRAINING THERAPY: CPT

## 2020-07-08 PROCEDURE — 76210000001 HC OR PH I REC 2.5 TO 3 HR: Performed by: ORTHOPAEDIC SURGERY

## 2020-07-08 PROCEDURE — 77030041397 HC DRSG PRIMASEAL AG MDII -B: Performed by: ORTHOPAEDIC SURGERY

## 2020-07-08 PROCEDURE — 77030011264 HC ELECTRD BLD EXT COVD -A: Performed by: ORTHOPAEDIC SURGERY

## 2020-07-08 PROCEDURE — 77030011640 HC PAD GRND REM COVD -A: Performed by: ORTHOPAEDIC SURGERY

## 2020-07-08 PROCEDURE — 77030014125 HC TY EPDRL BBMI -B: Performed by: ANESTHESIOLOGY

## 2020-07-08 PROCEDURE — 77030031139 HC SUT VCRL2 J&J -A: Performed by: ORTHOPAEDIC SURGERY

## 2020-07-08 PROCEDURE — 74011250636 HC RX REV CODE- 250/636: Performed by: ANESTHESIOLOGY

## 2020-07-08 PROCEDURE — 86900 BLOOD TYPING SEROLOGIC ABO: CPT

## 2020-07-08 PROCEDURE — 74011250636 HC RX REV CODE- 250/636: Performed by: ORTHOPAEDIC SURGERY

## 2020-07-08 PROCEDURE — 77030036660

## 2020-07-08 PROCEDURE — 73501 X-RAY EXAM HIP UNI 1 VIEW: CPT

## 2020-07-08 PROCEDURE — 99218 HC RM OBSERVATION: CPT

## 2020-07-08 PROCEDURE — 77030019905 HC CATH URETH INTMIT MDII -A: Performed by: ORTHOPAEDIC SURGERY

## 2020-07-08 PROCEDURE — 77030035236 HC SUT PDS STRATFX BARB J&J -B: Performed by: ORTHOPAEDIC SURGERY

## 2020-07-08 PROCEDURE — 77030005513 HC CATH URETH FOL11 MDII -B: Performed by: ORTHOPAEDIC SURGERY

## 2020-07-08 PROCEDURE — 74011000258 HC RX REV CODE- 258: Performed by: ORTHOPAEDIC SURGERY

## 2020-07-08 PROCEDURE — 77030003671 HC NDL SPN HAVL -B: Performed by: ANESTHESIOLOGY

## 2020-07-08 PROCEDURE — 77030002933 HC SUT MCRYL J&J -A: Performed by: ORTHOPAEDIC SURGERY

## 2020-07-08 PROCEDURE — 77030027138 HC INCENT SPIROMETER -A

## 2020-07-08 PROCEDURE — 76010000162 HC OR TIME 1.5 TO 2 HR INTENSV-TIER 1: Performed by: ORTHOPAEDIC SURGERY

## 2020-07-08 PROCEDURE — C9290 INJ, BUPIVACAINE LIPOSOME: HCPCS | Performed by: ORTHOPAEDIC SURGERY

## 2020-07-08 PROCEDURE — 97161 PT EVAL LOW COMPLEX 20 MIN: CPT

## 2020-07-08 PROCEDURE — 77030018547 HC SUT ETHBND1 J&J -B: Performed by: ORTHOPAEDIC SURGERY

## 2020-07-08 PROCEDURE — 74011000250 HC RX REV CODE- 250: Performed by: ORTHOPAEDIC SURGERY

## 2020-07-08 PROCEDURE — 77030040922 HC BLNKT HYPOTHRM STRY -A

## 2020-07-08 PROCEDURE — 77030018836 HC SOL IRR NACL ICUM -A: Performed by: ORTHOPAEDIC SURGERY

## 2020-07-08 PROCEDURE — 97530 THERAPEUTIC ACTIVITIES: CPT

## 2020-07-08 PROCEDURE — 74011000258 HC RX REV CODE- 258: Performed by: NURSE ANESTHETIST, CERTIFIED REGISTERED

## 2020-07-08 PROCEDURE — 77030018846 HC SOL IRR STRL H20 ICUM -A: Performed by: ORTHOPAEDIC SURGERY

## 2020-07-08 PROCEDURE — 76060000035 HC ANESTHESIA 2 TO 2.5 HR: Performed by: ORTHOPAEDIC SURGERY

## 2020-07-08 PROCEDURE — 74011000250 HC RX REV CODE- 250: Performed by: NURSE ANESTHETIST, CERTIFIED REGISTERED

## 2020-07-08 PROCEDURE — 77030040361 HC SLV COMPR DVT MDII -B

## 2020-07-08 PROCEDURE — C1776 JOINT DEVICE (IMPLANTABLE): HCPCS | Performed by: ORTHOPAEDIC SURGERY

## 2020-07-08 PROCEDURE — 82962 GLUCOSE BLOOD TEST: CPT

## 2020-07-08 PROCEDURE — 77030033067 HC SUT PDO STRATFX SPIR J&J -B: Performed by: ORTHOPAEDIC SURGERY

## 2020-07-08 DEVICE — ACTIS DUOFIX HIP PROSTHESIS (FEMORAL STEM 12/14 TAPER CEMENTLESS SIZE 4 STD COLLAR)  CE
Type: IMPLANTABLE DEVICE | Site: HIP | Status: FUNCTIONAL
Brand: ACTIS

## 2020-07-08 DEVICE — BIOLOX DELTA CERAMIC FEMORAL HEAD 32MM DIA +1 12/14 TAPER
Type: IMPLANTABLE DEVICE | Site: HIP | Status: FUNCTIONAL
Brand: BIOLOX DELTA

## 2020-07-08 DEVICE — PINNACLE HIP SOLUTIONS ALTRX POLYETHYLENE ACETABULAR LINER NEUTRAL 32MM ID 48MM OD
Type: IMPLANTABLE DEVICE | Site: HIP | Status: FUNCTIONAL
Brand: PINNACLE ALTRX

## 2020-07-08 DEVICE — PINNACLE CANCELLOUS BONE SCREW 6.5MM X 35MM
Type: IMPLANTABLE DEVICE | Site: HIP | Status: FUNCTIONAL
Brand: PINNACLE

## 2020-07-08 DEVICE — PINNACLE CANCELLOUS BONE SCREW 6.5MM X 25MM
Type: IMPLANTABLE DEVICE | Site: HIP | Status: FUNCTIONAL
Brand: PINNACLE

## 2020-07-08 DEVICE — HIP H2 TOT ADV OTHER HD IMPL CAPPED SYNTHES: Type: IMPLANTABLE DEVICE | Status: FUNCTIONAL

## 2020-07-08 DEVICE — PINNACLE GRIPTION ACETABULAR SHELL SECTOR 48MM OD
Type: IMPLANTABLE DEVICE | Site: HIP | Status: FUNCTIONAL
Brand: PINNACLE GRIPTION

## 2020-07-08 RX ORDER — AMOXICILLIN 250 MG
1 CAPSULE ORAL 2 TIMES DAILY
Status: DISCONTINUED | OUTPATIENT
Start: 2020-07-08 | End: 2020-07-09 | Stop reason: HOSPADM

## 2020-07-08 RX ORDER — SODIUM CHLORIDE 0.9 % (FLUSH) 0.9 %
5-40 SYRINGE (ML) INJECTION EVERY 8 HOURS
Status: DISCONTINUED | OUTPATIENT
Start: 2020-07-08 | End: 2020-07-08 | Stop reason: HOSPADM

## 2020-07-08 RX ORDER — POLYETHYLENE GLYCOL 3350 17 G/17G
17 POWDER, FOR SOLUTION ORAL DAILY
Status: DISCONTINUED | OUTPATIENT
Start: 2020-07-09 | End: 2020-07-09 | Stop reason: HOSPADM

## 2020-07-08 RX ORDER — ONDANSETRON 2 MG/ML
4 INJECTION INTRAMUSCULAR; INTRAVENOUS AS NEEDED
Status: DISCONTINUED | OUTPATIENT
Start: 2020-07-08 | End: 2020-07-08 | Stop reason: HOSPADM

## 2020-07-08 RX ORDER — MIDAZOLAM HYDROCHLORIDE 1 MG/ML
1 INJECTION, SOLUTION INTRAMUSCULAR; INTRAVENOUS AS NEEDED
Status: DISCONTINUED | OUTPATIENT
Start: 2020-07-08 | End: 2020-07-08 | Stop reason: HOSPADM

## 2020-07-08 RX ORDER — FACIAL-BODY WIPES
10 EACH TOPICAL DAILY PRN
Status: DISCONTINUED | OUTPATIENT
Start: 2020-07-08 | End: 2020-07-09 | Stop reason: HOSPADM

## 2020-07-08 RX ORDER — BUPIVACAINE HYDROCHLORIDE 5 MG/ML
INJECTION, SOLUTION EPIDURAL; INTRACAUDAL AS NEEDED
Status: DISCONTINUED | OUTPATIENT
Start: 2020-07-08 | End: 2020-07-08 | Stop reason: HOSPADM

## 2020-07-08 RX ORDER — CELECOXIB 200 MG/1
200 CAPSULE ORAL ONCE
Status: COMPLETED | OUTPATIENT
Start: 2020-07-08 | End: 2020-07-08

## 2020-07-08 RX ORDER — LOSARTAN POTASSIUM 25 MG/1
100 TABLET ORAL DAILY
Status: DISCONTINUED | OUTPATIENT
Start: 2020-07-09 | End: 2020-07-09 | Stop reason: HOSPADM

## 2020-07-08 RX ORDER — CEFAZOLIN SODIUM/WATER 2 G/20 ML
2 SYRINGE (ML) INTRAVENOUS EVERY 8 HOURS
Status: COMPLETED | OUTPATIENT
Start: 2020-07-08 | End: 2020-07-09

## 2020-07-08 RX ORDER — SODIUM CHLORIDE 9 MG/ML
125 INJECTION, SOLUTION INTRAVENOUS CONTINUOUS
Status: DISPENSED | OUTPATIENT
Start: 2020-07-08 | End: 2020-07-09

## 2020-07-08 RX ORDER — MORPHINE SULFATE 4 MG/ML
INJECTION INTRAVENOUS AS NEEDED
Status: DISCONTINUED | OUTPATIENT
Start: 2020-07-08 | End: 2020-07-08 | Stop reason: HOSPADM

## 2020-07-08 RX ORDER — SODIUM CHLORIDE 0.9 % (FLUSH) 0.9 %
5-40 SYRINGE (ML) INJECTION AS NEEDED
Status: DISCONTINUED | OUTPATIENT
Start: 2020-07-08 | End: 2020-07-08 | Stop reason: HOSPADM

## 2020-07-08 RX ORDER — ACETAMINOPHEN 325 MG/1
650 TABLET ORAL EVERY 6 HOURS
Status: DISCONTINUED | OUTPATIENT
Start: 2020-07-08 | End: 2020-07-09 | Stop reason: HOSPADM

## 2020-07-08 RX ORDER — TRAMADOL HYDROCHLORIDE 50 MG/1
50 TABLET ORAL
Status: DISCONTINUED | OUTPATIENT
Start: 2020-07-08 | End: 2020-07-09 | Stop reason: HOSPADM

## 2020-07-08 RX ORDER — PROPOFOL 10 MG/ML
INJECTION, EMULSION INTRAVENOUS AS NEEDED
Status: DISCONTINUED | OUTPATIENT
Start: 2020-07-08 | End: 2020-07-08 | Stop reason: HOSPADM

## 2020-07-08 RX ORDER — HYDROCHLOROTHIAZIDE 25 MG/1
12.5 TABLET ORAL DAILY
Status: DISCONTINUED | OUTPATIENT
Start: 2020-07-09 | End: 2020-07-09 | Stop reason: HOSPADM

## 2020-07-08 RX ORDER — ROPIVACAINE HYDROCHLORIDE 5 MG/ML
30 INJECTION, SOLUTION EPIDURAL; INFILTRATION; PERINEURAL AS NEEDED
Status: DISCONTINUED | OUTPATIENT
Start: 2020-07-08 | End: 2020-07-08 | Stop reason: HOSPADM

## 2020-07-08 RX ORDER — ONDANSETRON 2 MG/ML
INJECTION INTRAMUSCULAR; INTRAVENOUS AS NEEDED
Status: DISCONTINUED | OUTPATIENT
Start: 2020-07-08 | End: 2020-07-08 | Stop reason: HOSPADM

## 2020-07-08 RX ORDER — ASPIRIN 81 MG/1
81 TABLET ORAL 2 TIMES DAILY
Status: DISCONTINUED | OUTPATIENT
Start: 2020-07-08 | End: 2020-07-09 | Stop reason: HOSPADM

## 2020-07-08 RX ORDER — DIPHENHYDRAMINE HYDROCHLORIDE 50 MG/ML
12.5 INJECTION, SOLUTION INTRAMUSCULAR; INTRAVENOUS AS NEEDED
Status: DISCONTINUED | OUTPATIENT
Start: 2020-07-08 | End: 2020-07-08 | Stop reason: HOSPADM

## 2020-07-08 RX ORDER — KETOROLAC TROMETHAMINE 30 MG/ML
15 INJECTION, SOLUTION INTRAMUSCULAR; INTRAVENOUS EVERY 6 HOURS
Status: DISCONTINUED | OUTPATIENT
Start: 2020-07-08 | End: 2020-07-09 | Stop reason: HOSPADM

## 2020-07-08 RX ORDER — EPHEDRINE SULFATE/0.9% NACL/PF 50 MG/5 ML
SYRINGE (ML) INTRAVENOUS AS NEEDED
Status: DISCONTINUED | OUTPATIENT
Start: 2020-07-08 | End: 2020-07-08 | Stop reason: HOSPADM

## 2020-07-08 RX ORDER — SODIUM CHLORIDE 9 MG/ML
25 INJECTION, SOLUTION INTRAVENOUS CONTINUOUS
Status: DISCONTINUED | OUTPATIENT
Start: 2020-07-08 | End: 2020-07-08 | Stop reason: HOSPADM

## 2020-07-08 RX ORDER — PREGABALIN 75 MG/1
75 CAPSULE ORAL ONCE
Status: COMPLETED | OUTPATIENT
Start: 2020-07-08 | End: 2020-07-08

## 2020-07-08 RX ORDER — KETOROLAC TROMETHAMINE 30 MG/ML
30 INJECTION, SOLUTION INTRAMUSCULAR; INTRAVENOUS EVERY 6 HOURS
Status: DISCONTINUED | OUTPATIENT
Start: 2020-07-08 | End: 2020-07-08

## 2020-07-08 RX ORDER — SODIUM CHLORIDE 0.9 % (FLUSH) 0.9 %
5-40 SYRINGE (ML) INJECTION AS NEEDED
Status: DISCONTINUED | OUTPATIENT
Start: 2020-07-08 | End: 2020-07-09 | Stop reason: HOSPADM

## 2020-07-08 RX ORDER — HYDROMORPHONE HYDROCHLORIDE 1 MG/ML
0.5 INJECTION, SOLUTION INTRAMUSCULAR; INTRAVENOUS; SUBCUTANEOUS
Status: DISCONTINUED | OUTPATIENT
Start: 2020-07-08 | End: 2020-07-08 | Stop reason: HOSPADM

## 2020-07-08 RX ORDER — ACETAMINOPHEN 500 MG
1000 TABLET ORAL ONCE
Status: COMPLETED | OUTPATIENT
Start: 2020-07-08 | End: 2020-07-08

## 2020-07-08 RX ORDER — SODIUM CHLORIDE, SODIUM LACTATE, POTASSIUM CHLORIDE, CALCIUM CHLORIDE 600; 310; 30; 20 MG/100ML; MG/100ML; MG/100ML; MG/100ML
INJECTION, SOLUTION INTRAVENOUS
Status: DISCONTINUED | OUTPATIENT
Start: 2020-07-08 | End: 2020-07-08 | Stop reason: HOSPADM

## 2020-07-08 RX ORDER — CEFAZOLIN SODIUM/WATER 2 G/20 ML
2 SYRINGE (ML) INTRAVENOUS ONCE
Status: COMPLETED | OUTPATIENT
Start: 2020-07-08 | End: 2020-07-08

## 2020-07-08 RX ORDER — SODIUM CHLORIDE, SODIUM LACTATE, POTASSIUM CHLORIDE, CALCIUM CHLORIDE 600; 310; 30; 20 MG/100ML; MG/100ML; MG/100ML; MG/100ML
100 INJECTION, SOLUTION INTRAVENOUS CONTINUOUS
Status: DISCONTINUED | OUTPATIENT
Start: 2020-07-08 | End: 2020-07-08 | Stop reason: HOSPADM

## 2020-07-08 RX ORDER — ACETAMINOPHEN 325 MG/1
650 TABLET ORAL ONCE
Status: DISCONTINUED | OUTPATIENT
Start: 2020-07-08 | End: 2020-07-08 | Stop reason: SDUPTHER

## 2020-07-08 RX ORDER — PHENYLEPHRINE HCL IN 0.9% NACL 0.4MG/10ML
SYRINGE (ML) INTRAVENOUS AS NEEDED
Status: DISCONTINUED | OUTPATIENT
Start: 2020-07-08 | End: 2020-07-08 | Stop reason: HOSPADM

## 2020-07-08 RX ORDER — LIDOCAINE HYDROCHLORIDE 10 MG/ML
0.1 INJECTION, SOLUTION EPIDURAL; INFILTRATION; INTRACAUDAL; PERINEURAL AS NEEDED
Status: DISCONTINUED | OUTPATIENT
Start: 2020-07-08 | End: 2020-07-08 | Stop reason: HOSPADM

## 2020-07-08 RX ORDER — NALOXONE HYDROCHLORIDE 0.4 MG/ML
0.4 INJECTION, SOLUTION INTRAMUSCULAR; INTRAVENOUS; SUBCUTANEOUS AS NEEDED
Status: DISCONTINUED | OUTPATIENT
Start: 2020-07-08 | End: 2020-07-09 | Stop reason: HOSPADM

## 2020-07-08 RX ORDER — HYDROCODONE BITARTRATE AND ACETAMINOPHEN 5; 325 MG/1; MG/1
1 TABLET ORAL
Status: DISCONTINUED | OUTPATIENT
Start: 2020-07-08 | End: 2020-07-09 | Stop reason: HOSPADM

## 2020-07-08 RX ORDER — MONTELUKAST SODIUM 10 MG/1
10 TABLET ORAL DAILY
Status: DISCONTINUED | OUTPATIENT
Start: 2020-07-09 | End: 2020-07-09 | Stop reason: HOSPADM

## 2020-07-08 RX ORDER — ONDANSETRON 2 MG/ML
4 INJECTION INTRAMUSCULAR; INTRAVENOUS
Status: ACTIVE | OUTPATIENT
Start: 2020-07-08 | End: 2020-07-09

## 2020-07-08 RX ORDER — SODIUM CHLORIDE 0.9 % (FLUSH) 0.9 %
5-40 SYRINGE (ML) INJECTION EVERY 8 HOURS
Status: DISCONTINUED | OUTPATIENT
Start: 2020-07-08 | End: 2020-07-09 | Stop reason: HOSPADM

## 2020-07-08 RX ORDER — HYDROMORPHONE HYDROCHLORIDE 1 MG/ML
0.5 INJECTION, SOLUTION INTRAMUSCULAR; INTRAVENOUS; SUBCUTANEOUS
Status: ACTIVE | OUTPATIENT
Start: 2020-07-08 | End: 2020-07-09

## 2020-07-08 RX ORDER — PROPOFOL 10 MG/ML
INJECTION, EMULSION INTRAVENOUS
Status: DISCONTINUED | OUTPATIENT
Start: 2020-07-08 | End: 2020-07-08 | Stop reason: HOSPADM

## 2020-07-08 RX ORDER — LIDOCAINE HYDROCHLORIDE 20 MG/ML
INJECTION, SOLUTION EPIDURAL; INFILTRATION; INTRACAUDAL; PERINEURAL AS NEEDED
Status: DISCONTINUED | OUTPATIENT
Start: 2020-07-08 | End: 2020-07-08 | Stop reason: HOSPADM

## 2020-07-08 RX ORDER — FAMOTIDINE 20 MG/1
20 TABLET, FILM COATED ORAL 2 TIMES DAILY
Status: DISCONTINUED | OUTPATIENT
Start: 2020-07-08 | End: 2020-07-09 | Stop reason: HOSPADM

## 2020-07-08 RX ORDER — OXYCODONE HYDROCHLORIDE 5 MG/1
5 TABLET ORAL
Status: DISCONTINUED | OUTPATIENT
Start: 2020-07-08 | End: 2020-07-08

## 2020-07-08 RX ORDER — HYDROXYZINE HYDROCHLORIDE 10 MG/1
10 TABLET, FILM COATED ORAL
Status: DISCONTINUED | OUTPATIENT
Start: 2020-07-08 | End: 2020-07-09 | Stop reason: HOSPADM

## 2020-07-08 RX ORDER — FENTANYL CITRATE 50 UG/ML
25 INJECTION, SOLUTION INTRAMUSCULAR; INTRAVENOUS
Status: COMPLETED | OUTPATIENT
Start: 2020-07-08 | End: 2020-07-08

## 2020-07-08 RX ORDER — MIDAZOLAM HYDROCHLORIDE 1 MG/ML
0.5 INJECTION, SOLUTION INTRAMUSCULAR; INTRAVENOUS
Status: DISCONTINUED | OUTPATIENT
Start: 2020-07-08 | End: 2020-07-08 | Stop reason: HOSPADM

## 2020-07-08 RX ORDER — MIDAZOLAM HYDROCHLORIDE 1 MG/ML
INJECTION, SOLUTION INTRAMUSCULAR; INTRAVENOUS AS NEEDED
Status: DISCONTINUED | OUTPATIENT
Start: 2020-07-08 | End: 2020-07-08 | Stop reason: HOSPADM

## 2020-07-08 RX ORDER — ATORVASTATIN CALCIUM 40 MG/1
40 TABLET, FILM COATED ORAL DAILY
Status: DISCONTINUED | OUTPATIENT
Start: 2020-07-09 | End: 2020-07-09 | Stop reason: HOSPADM

## 2020-07-08 RX ORDER — FENTANYL CITRATE 50 UG/ML
50 INJECTION, SOLUTION INTRAMUSCULAR; INTRAVENOUS AS NEEDED
Status: DISCONTINUED | OUTPATIENT
Start: 2020-07-08 | End: 2020-07-08 | Stop reason: HOSPADM

## 2020-07-08 RX ORDER — MORPHINE SULFATE 10 MG/ML
2 INJECTION, SOLUTION INTRAMUSCULAR; INTRAVENOUS
Status: DISCONTINUED | OUTPATIENT
Start: 2020-07-08 | End: 2020-07-08 | Stop reason: HOSPADM

## 2020-07-08 RX ADMIN — CEFAZOLIN SODIUM 2 G: 300 INJECTION, POWDER, LYOPHILIZED, FOR SOLUTION INTRAVENOUS at 16:33

## 2020-07-08 RX ADMIN — PROPOFOL 80 MCG/KG/MIN: 10 INJECTION, EMULSION INTRAVENOUS at 08:47

## 2020-07-08 RX ADMIN — CELECOXIB 200 MG: 200 CAPSULE ORAL at 08:21

## 2020-07-08 RX ADMIN — PROPOFOL 50 MG: 10 INJECTION, EMULSION INTRAVENOUS at 08:51

## 2020-07-08 RX ADMIN — MIDAZOLAM 1 MG: 1 INJECTION INTRAMUSCULAR; INTRAVENOUS at 09:38

## 2020-07-08 RX ADMIN — FAMOTIDINE 20 MG: 20 TABLET ORAL at 18:07

## 2020-07-08 RX ADMIN — Medication 80 MCG: at 08:56

## 2020-07-08 RX ADMIN — FENTANYL CITRATE 25 MCG: 50 INJECTION INTRAMUSCULAR; INTRAVENOUS at 12:15

## 2020-07-08 RX ADMIN — BUPIVACAINE HYDROCHLORIDE 10 MG: 5 INJECTION, SOLUTION EPIDURAL; INTRACAUDAL; PERINEURAL at 08:49

## 2020-07-08 RX ADMIN — Medication 80 MCG: at 09:02

## 2020-07-08 RX ADMIN — SODIUM CHLORIDE 125 ML/HR: 900 INJECTION, SOLUTION INTRAVENOUS at 22:03

## 2020-07-08 RX ADMIN — TRANEXAMIC ACID 1 G: 100 INJECTION, SOLUTION INTRAVENOUS at 09:16

## 2020-07-08 RX ADMIN — FENTANYL CITRATE 25 MCG: 50 INJECTION INTRAMUSCULAR; INTRAVENOUS at 11:50

## 2020-07-08 RX ADMIN — ACETAMINOPHEN 650 MG: 325 TABLET ORAL at 22:02

## 2020-07-08 RX ADMIN — SODIUM CHLORIDE, SODIUM LACTATE, POTASSIUM CHLORIDE, AND CALCIUM CHLORIDE 100 ML/HR: 600; 310; 30; 20 INJECTION, SOLUTION INTRAVENOUS at 08:16

## 2020-07-08 RX ADMIN — SODIUM CHLORIDE, POTASSIUM CHLORIDE, SODIUM LACTATE AND CALCIUM CHLORIDE: 600; 310; 30; 20 INJECTION, SOLUTION INTRAVENOUS at 08:37

## 2020-07-08 RX ADMIN — MIDAZOLAM 2 MG: 1 INJECTION INTRAMUSCULAR; INTRAVENOUS at 08:39

## 2020-07-08 RX ADMIN — KETOROLAC TROMETHAMINE 15 MG: 30 INJECTION, SOLUTION INTRAMUSCULAR at 18:07

## 2020-07-08 RX ADMIN — LIDOCAINE HYDROCHLORIDE 40 MG: 20 INJECTION, SOLUTION EPIDURAL; INFILTRATION; INTRACAUDAL; PERINEURAL at 08:47

## 2020-07-08 RX ADMIN — ACETAMINOPHEN 1000 MG: 500 TABLET ORAL at 08:21

## 2020-07-08 RX ADMIN — Medication 80 MCG: at 08:49

## 2020-07-08 RX ADMIN — DOCUSATE SODIUM 50MG AND SENNOSIDES 8.6MG 1 TABLET: 8.6; 5 TABLET, FILM COATED ORAL at 18:07

## 2020-07-08 RX ADMIN — ASPIRIN 81 MG: 81 TABLET, COATED ORAL at 18:07

## 2020-07-08 RX ADMIN — PREGABALIN 75 MG: 75 CAPSULE ORAL at 08:21

## 2020-07-08 RX ADMIN — FENTANYL CITRATE 25 MCG: 50 INJECTION INTRAMUSCULAR; INTRAVENOUS at 13:04

## 2020-07-08 RX ADMIN — Medication 2 G: at 09:02

## 2020-07-08 RX ADMIN — Medication 10 MG: at 10:45

## 2020-07-08 RX ADMIN — FENTANYL CITRATE 25 MCG: 50 INJECTION INTRAMUSCULAR; INTRAVENOUS at 12:20

## 2020-07-08 RX ADMIN — MIDAZOLAM 1 MG: 1 INJECTION INTRAMUSCULAR; INTRAVENOUS at 09:20

## 2020-07-08 RX ADMIN — ACETAMINOPHEN 650 MG: 325 TABLET ORAL at 14:15

## 2020-07-08 RX ADMIN — Medication 10 ML: at 22:03

## 2020-07-08 RX ADMIN — PHENYLEPHRINE HYDROCHLORIDE 40 MCG/MIN: 10 INJECTION INTRAVENOUS at 09:02

## 2020-07-08 RX ADMIN — PROPOFOL 50 MG: 10 INJECTION, EMULSION INTRAVENOUS at 09:02

## 2020-07-08 RX ADMIN — TRAMADOL HYDROCHLORIDE 50 MG: 50 TABLET, FILM COATED ORAL at 14:15

## 2020-07-08 RX ADMIN — SODIUM CHLORIDE, POTASSIUM CHLORIDE, SODIUM LACTATE AND CALCIUM CHLORIDE: 600; 310; 30; 20 INJECTION, SOLUTION INTRAVENOUS at 09:52

## 2020-07-08 RX ADMIN — MORPHINE SULFATE 4 MG: 4 INJECTION INTRAVENOUS at 10:27

## 2020-07-08 RX ADMIN — ONDANSETRON HYDROCHLORIDE 4 MG: 2 INJECTION, SOLUTION INTRAMUSCULAR; INTRAVENOUS at 10:26

## 2020-07-08 RX ADMIN — SODIUM CHLORIDE 125 ML/HR: 900 INJECTION, SOLUTION INTRAVENOUS at 11:05

## 2020-07-08 RX ADMIN — PROPOFOL 50 MG: 10 INJECTION, EMULSION INTRAVENOUS at 08:47

## 2020-07-08 RX ADMIN — TRAMADOL HYDROCHLORIDE 50 MG: 50 TABLET, FILM COATED ORAL at 22:02

## 2020-07-08 NOTE — PROGRESS NOTES
Occupational Therapy   Orders received, chart review completed. Note patient POD #0 and not a fast track at this time. OT will follow up tomorrow for evaluation. Recommend OOB to chair three times a day for meals, self-completion of ADLs as able and medically stable. Thank you.

## 2020-07-08 NOTE — PROGRESS NOTES
Problem: Mobility Impaired (Adult and Pediatric)  Goal: *Acute Goals and Plan of Care (Insert Text)  Description: FUNCTIONAL STATUS PRIOR TO ADMISSION: Patient was independent and active without use of DME.    HOME SUPPORT PRIOR TO ADMISSION: The patient lived alone with son & son's significant other to provide assistance. Physical Therapy Goals  Initiated 7/8/2020    1. Patient will move from supine to sit and sit to supine , scoot up and down and roll side to side in bed with modified independence within 4 days. 2. Patient will perform sit to stand with modified independence within 4 days. 3. Patient will ambulate with modified independence for 150 feet with the least restrictive device within 4 days. 4. Patient will ascend/descend 4 stairs with cane and one handrail(s) with modified independence within 4 days. 5. Patient will perform post-CURTIS home exercise program per protocol with independence within 4 days. Outcome: Progressing Towards Goal   PHYSICAL THERAPY EVALUATION  Patient: Terese Aguilar (9 y.o. female)  Date: 7/8/2020  Primary Diagnosis: Osteoarthritis of left hip, unspecified osteoarthritis type [M16.12]  Procedure(s) (LRB):  LEFT TOTAL HIP ARTHROPLASTY  - ANTERIOR (Left) Day of Surgery   Precautions:   Fall, WBAT      ASSESSMENT  Based on the objective data described below, the patient presents with impairment in functional mobility, activity tolerance and balance s/p L CURTIS. PLOF: Independent with ADLs and IADLs. Lives alone in one story home with 6 steps with rail to enter. Son & son's significant other will assist her after discharge. Patient's mobility was on target for POD#0. Will address more exercises, increase gait distance, negotiate stairs and assess for discharge at am PT session tomorrow. Patient instructed NOT to get up from bed, chair or commode without calling for assistance. Initiated post CURTIS exercise protocol.     Current Level of Function Impacting Discharge (mobility/balance): All mobility performed with contact guard assistance. Able to self-manage LLE. Ambulated 54 ft with RW and gait belt. Functional Outcome Measure: The patient scored 55/100 on the Barthel outcome measure which is indicative of moderate impaired ability to care for basic self-needs/dependency on others. Other factors to consider for discharge: Motivated/A & O x 4/Supportive Family/Independent PLOF      Patient will benefit from skilled therapy intervention to address the above noted impairments. PLAN :  Recommendations and Planned Interventions: bed mobility training, transfer training, gait training, therapeutic exercises, patient and family training/education, and therapeutic activities      Frequency/Duration: Patient will be followed by physical therapy:  twice daily to address goals. Recommendation for discharge: (in order for the patient to meet his/her long term goals)  Physical therapy at least 2 days/week in the home     This discharge recommendation:  Has been made in collaboration with the attending provider and/or case management    IF patient discharges home will need the following DME: patient owns DME required for discharge         SUBJECTIVE:   Patient stated I am ready to get up.     OBJECTIVE DATA SUMMARY:   HISTORY:    Past Medical History:   Diagnosis Date    Allergic asthma     SEASONAL AND DOG ALLERGIES    Arthritis     Cyst     removal -groin-dr singer    Cyst of left kidney 01/26/2020    Hypercholesterolemia 2001    Hypertension 2001    Non-seasonal allergic rhinitis 6-2011    dr Keny Lieberman    Osteoporosis 2006     Past Surgical History:   Procedure Laterality Date    COLONOSCOPY N/A 11/17/2017    COLONOSCOPY performed by Imelda Bronson MD at [facility], [address] [facility], [facility]  2007    due 2017    HX GYN  1974    OVARIAN CYST REMOVED    HX ORTHOPAEDIC      R hip replacement    NATALIE BIOPSY BREAST STEREOTACTIC Right 2010    benign    NEUROLOGICAL PROCEDURE UNLISTED  2020    TRIGGER FINGER, CYST REMOVAL, TENDON RELEASE       Personal factors and/or comorbidities impacting plan of care: Motivated/A & O x 4/Supportive Family/Independent PLOF     Home Situation  Home Environment: Private residence  # Steps to Enter: 6  Rails to Enter: Yes  Hand Rails : Right  Wheelchair Ramp: No  One/Two Story Residence: One story  Living Alone: Yes  Support Systems: Child(john)  Patient Expects to be Discharged to[de-identified] Private residence  Current DME Used/Available at Home: Commode, bedside, Shower chair, Cane, straight, Walker, rolling(built in bench too)  Tub or Shower Type: Shower    EXAMINATION/PRESENTATION/DECISION MAKING:   Critical Behavior:  Neurologic State: Alert  Orientation Level: Oriented X4  Cognition: Appropriate decision making, Appropriate for age attention/concentration, Appropriate safety awareness, Follows commands     Hearing: Auditory  Auditory Impairment: None    Range Of Motion:  AROM: Generally decreased, functional           PROM: Generally decreased, functional           Strength:    Strength: Generally decreased, functional                    Tone & Sensation:   Tone: Normal              Sensation: Intact               Coordination:  Coordination: Within functional limits  Vision:      Functional Mobility:  Bed Mobility:     Supine to Sit: Contact guard assistance  Sit to Supine: Contact guard assistance  Scooting: Contact guard assistance  Transfers:  Sit to Stand: Contact guard assistance  Stand to Sit: Contact guard assistance                       Balance:   Sitting: Intact  Standing: Intact; With support  Ambulation/Gait Training:  Distance (ft): 55 Feet (ft)  Assistive Device: Walker, rolling;Gait belt  Ambulation - Level of Assistance: Contact guard assistance        Gait Abnormalities: Antalgic     Left Side Weight Bearing: As tolerated  Base of Support: Widened;Shift to right  Stance: Left decreased  Speed/Janie: Slow  Step Length: Right shortened  Swing Pattern: Left asymmetrical             Therapeutic Exercises: Ankle Pumps  Quad sets (5 second hold)  X 10 reps every hour   Heel slides x 10     Functional Measure:  Barthel Index:    Bathin  Bladder: 10  Bowels: 10  Groomin  Dressin  Feeding: 10  Mobility: 0  Stairs: 0  Toilet Use: 5  Transfer (Bed to Chair and Back): 10  Total: 55/100       The Barthel ADL Index: Guidelines  1. The index should be used as a record of what a patient does, not as a record of what a patient could do. 2. The main aim is to establish degree of independence from any help, physical or verbal, however minor and for whatever reason. 3. The need for supervision renders the patient not independent. 4. A patient's performance should be established using the best available evidence. Asking the patient, friends/relatives and nurses are the usual sources, but direct observation and common sense are also important. However direct testing is not needed. 5. Usually the patient's performance over the preceding 24-48 hours is important, but occasionally longer periods will be relevant. 6. Middle categories imply that the patient supplies over 50 per cent of the effort. 7. Use of aids to be independent is allowed. Bety Combs., Barthel, D.W. (2422). Functional evaluation: the Barthel Index. [address] [facility] (14)2. LADAN Dominguez, Mehnaz Avery., Lucina Beckwith., Arnett, [address] [place] [address] (). Measuring the change indisability after inpatient rehabilitation; comparison of the responsiveness of the Barthel Index and Functional Godfrey Measure. Journal of Neurology, Neurosurgery, and Psychiatry, 66(4), 203-123. Londonderry Jamal, N.J.A, ESTUARDO Carver, & Michelle Loya MEmeraldA. (2004.) Assessment of post-stroke quality of life in cost-effectiveness studies: The usefulness of the Barthel Index and the EuroQoL-5D.  Quality of Life Research, 15, 527-96           Physical Therapy Evaluation Charge Determination History Examination Presentation Decision-Making   LOW Complexity : Zero comorbidities / personal factors that will impact the outcome / POC LOW Complexity : 1-2 Standardized tests and measures addressing body structure, function, activity limitation and / or participation in recreation  LOW Complexity : Stable, uncomplicated  LOW Complexity : FOTO score of       Based on the above components, the patient evaluation is determined to be of the following complexity level: LOW     Pain Ratin/10 left thigh    Activity Tolerance:   Good  Please refer to the flowsheet for vital signs taken during this treatment. Vitals:    20 1345 20 1450 20 1540 20 1549   BP: 122/73 141/77 116/65 138/78   BP 1 Location: Right arm Right arm Right arm Right arm   BP Patient Position: At rest At rest At rest;Head of bed elevated (Comment degrees); Other (comment) During activity; Sitting   Pulse: 88 93 91 96   Resp: 14 16 17    Temp: 97.8 °F (36.6 °C) 98.1 °F (36.7 °C) 98.1 °F (36.7 °C)    SpO2: 94% 95% 96%    Weight:       Height:            After treatment patient left in no apparent distress:   Supine in bed, Call bell within reach, Side rails x 3, and nurse notified. COMMUNICATION/EDUCATION:   The patients plan of care was discussed with: Registered nurse. Fall prevention education was provided and the patient/caregiver indicated understanding., Patient/family have participated as able in goal setting and plan of care. , and Patient/family agree to work toward stated goals and plan of care.     Thank you for this referral.  Sharad Mistry   Time Calculation: 30 mins

## 2020-07-08 NOTE — DISCHARGE INSTRUCTIONS
Discharge Instructions Anterior Approach   Hip Replacement-Dr. Kat SEmerald [facility]  Patient Name: Brian La  Date of procedure:7/8/2020                                   Procedure:Procedure(s):  LEFT TOTAL HIP ARTHROPLASTY  - ANTERIOR  Surgeon:Surgeon(s) and Role:     * Toribio Juarez MD - Primary               PCP: Fariba Oneil MD  Date of discharge: 7/                         Follow up appointments   Follow up with Dr. Kat [facility] in 4 weeks. Call [phone] extension [phone] [facility]) to make an appointment.  If home health has been arranged for you the agency will contact you to arrange dates/times for visits. Please call them if you do not hear from them within 24 hours after you are discharged. When to call your Orthopaedic Surgeon: Call [phone]. If you need to reach us after 5pm or on a weekend call [phone] and the on call physician will be contacted.  Increased hip pain; unrelieved pain or if you have difficulty or are unable to walk   Signs of infection-if your incision is red; continues to have drainage; drainage has a foul odor or if you have a persistent fever over 101 degrees   Signs of a blood clot in your leg-calf pain, tenderness, redness, swelling of lower leg  When to call your Primary Care Physician:   Concerns about medical conditions such as diabetes, high blood pressure, asthma, congestive heart failure   Call if blood sugars are elevated, persistent headache or dizziness, coughing or congestion, constipation or diarrhea, burning with urination, abnormal heart rate     When to call [id number] go to the nearest emergency room   Sudden onset of chest pain, shortness of breath, difficulty breathing     Activity   Weight bearing as tolerated with walker or crutches.  Refer to your handbook for instructions and pictures   Complete your Home Exercise Program daily as instructed by the physical therapist.  Refer to your handbook for instructions and pictures   Get up every one hour and walk (except at night when sleeping)   AVOID sudden and extreme movement of hip to prevent dislocation   Do not drive or operate heavy machinery    Incision Care   The Aquacel (brown, waterproof) surgical dressing is to remain on your hip for 7 days. On the 7th day have someone gently peel the dressing off by carefully lifting the edge and stretching it slightly to break the adhesive seal and leave incision open to air. You may take a shower with the Aquacel dressing in place   Once the Aquacel is removed, you may get your incision wet but do not submerge your incision under water in a bath tub, hot tub or swimming pool for 8 weeks after surgery. Preventing blood clots    Take aspirin 81 mg twice daily to prevent blood clots. Pain management   Please take scheduled 650 mg tylenol every 6 hours for 6 weeks   Please take tramadol every 6 hours for pain as needed for pain.  You will be given a prescription for 5/325mg tablets of norco. If you have severe pain 1 hour after taking tramadol, you may also take 1 tablet of norco   Norco can cause nausea and constipation- so please use sparingly and you may stop use as soon as your pain is reasonably controlled     Avoid alcoholic beverages while taking pain medication   Do not take any over-the-counter medication for pain except Tylenol (acetaminophen)   Keep ice wrap in place except when walking. Change gel packs every 4 hours   Lie down and elevate your leg on pillows for about 30 minutes after walking to decrease swelling and pain       Diet   Resume usual diet; drink plenty of fluids; eat foods high in fiber   Please take a stool softener (such as Senokot-S or Colace) to prevent constipation while you are taking oxycodone. If constipation occurs, take a laxative (such as Dulcolax tablets, Milk of Magnesia, or a suppository).

## 2020-07-08 NOTE — PERIOP NOTES
TRANSFER - OUT REPORT:    Verbal report given to Chitra Watts RN(name) on Radha Johnson  being transferred to (unit) for routine post - op       Report consisted of patients Situation, Background, Assessment and   Recommendations(SBAR). Time Pre op antibiotic given:0902  Anesthesia Stop time: 2288  Villarreal Present on Transfer to floor:no  Order for Villarreal on Chart:no  Discharge Prescriptions with Chart:no    Information from the following report(s) SBAR, Procedure Summary, Intake/Output and MAR was reviewed with the receiving nurse. Opportunity for questions and clarification was provided. Is the patient on 02? NO         Is the patient on a monitor? NO    Is the nurse transporting with the patient? NO    Surgical Waiting Area notified of patient's transfer from PACU? NO      The following personal items collected during your admission accompanied patient upon transfer:   Dental Appliance: Dental Appliances: Uppers, Partials(PARTIAL UPPER TO PACU)  Vision: Visual Aid: Glasses  Hearing Aid:    Jewelry: Jewelry: None  Clothing: Clothing: At bedside, With patient  Other Valuables: Other Valuables:  At bedside, With patient  Valuables sent to safe: Personal Items Sent to Safe: SEE SECURITY RECORD FORM

## 2020-07-08 NOTE — H&P
H and P    Subjective:         Alisa Mcpherson is a 62 y.o. female who presents for left CURTIS after failure conservative mgmt.l     Patient Active Problem List    Diagnosis Date Noted    Leg pain 02/27/2020    Primary osteoarthritis of right hip 03/15/2017    Hypercholesterolemia     Hypertension     Cyst     Osteoporosis 08/08/2011    Allergic rhinitis, cause unspecified 08/08/2011    Allergic asthma 08/08/2011    Other and unspecified hyperlipidemia 08/08/2011    Essential hypertension, benign 08/08/2011    Non-seasonal allergic rhinitis 06/01/2011     Family History   Problem Relation Age of Onset    Breast Cancer Maternal Aunt     Dementia Mother     Hypertension Mother     High Cholesterol Mother     Diabetes Mother     Cancer Father         COLON    Hypertension Sister     Diabetes Sister     Hypertension Brother     Heart Disease Brother     Hypertension Sister     High Cholesterol Sister     Hypertension Brother     Anesth Problems Neg Hx       Social History     Tobacco Use    Smoking status: Current Every Day Smoker     Packs/day: 0.30     Years: 37.00     Pack years: 11.10     Types: Cigarettes    Smokeless tobacco: Never Used   Substance Use Topics    Alcohol use: Yes     Comment: SOCIALLY     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    Cyst of left kidney 01/26/2020    Hypercholesterolemia 2001    Hypertension 2001    Non-seasonal allergic rhinitis 6-2011    dr Myrna Marcelino    Osteoporosis 2006      Past Surgical History:   Procedure Laterality Date    COLONOSCOPY N/A 11/17/2017    COLONOSCOPY performed by Celeste Saleh MD at [facility]. [address], DIAGNOSTIC  2007    due 2017    HX GYN  1974    OVARIAN CYST REMOVED    HX ORTHOPAEDIC      R hip replacement    NTAALIE BIOPSY BREAST STEREOTACTIC Right 2010    benign    NEUROLOGICAL PROCEDURE UNLISTED  2020 TRIGGER FINGER, CYST REMOVAL, TENDON RELEASE      Prior to Admission medications    Medication Sig Start Date End Date Taking? Authorizing Provider   montelukast (Singulair) 10 mg tablet Take 10 mg by mouth daily. Provider, Historical   ASPIRIN PO Take 81 mg by mouth every seven (7) days. Provider, Historical   cholecalciferol (VITAMIN D3) 1,000 unit cap Take 1,000 Units by mouth daily. Provider, Historical   atorvastatin (LIPITOR) 40 mg tablet Take 40 mg by mouth daily. Provider, Historical   albuterol (PROVENTIL HFA, VENTOLIN HFA, PROAIR HFA) 90 mcg/actuation inhaler Take 2 puffs by inhalation every six (6) hours as needed for Wheezing. 2/5/15   Thien Harper,    Hydrochlorothiazide (HYDRODIURIL) 12.5 mg tablet Take 1 tablet by mouth daily. Needs to schedule ov before further refills. Indications: HYPERTENSION 9/18/14   Alejandro Nicole NP   irbesartan (AVAPRO) 300 mg tablet Take 1 Tab by mouth daily. 1/28/14   Alejandro Nicole NP     No current facility-administered medications for this encounter. No Known Allergies     Review of Systems:    Negative for fevers, chills, nausea, vomiting, chest pain, shortness of breath, headaches. Objective:     No data found. No data recorded. Gen: NAD, A&Ox3  Resp: Non-labored breathing  CV: Extremities well perfused  Abd: soft, NT  LLE:  pain with ROM, pos stinchfield, no swelling about knee or ankle, skin intact, warm well perfused, SILT in al distributions L3-S1, palpable pedal pulses, no calf tenderness    Imaging Review: AVN left hip    Labs: No results found for this or any previous visit (from the past 24 hour(s)). No current facility-administered medications for this encounter. Impression:     Active Problems:    * No active hospital problems.  *      Plan:   Plan for Erika Jolley MD

## 2020-07-08 NOTE — OP NOTES
Name: Lacey Lundborg  MRN:  [id number]  : 1955  Age:  62 y.o. Surgery Date: 2020      OPERATIVE REPORT - LEFT TOTAL HIP ARTHROPLASTY -   ANTERIOR APPROACH    PREOPERATIVE DIAGNOSIS: Osteoarthritis, left hip. POSTOPERATIVE DIAGNOSIS: Osteoarthritis, left hip. PROCEDURE PERFORMED: Left total hip arthroplasty. SURGEON: Ricky Rocha MD    FIRST ASSISTANT:  ELIANA Keith    ANESTHESIA: Spinal with sedation. PRE-OP ANTIBIOTIC: Ancef 2g    COMPLICATIONS: None. ESTIMATED BLOOD LOSS: 250 mL. SPECIMENS REMOVED: None. COMPONENTS IMPLANTED:   Implant Name Type Inv. Item Serial No.  Lot No. LRB No. Used Action   SCREW BONE FEM CANC 6. [id number] - SNA  SCREW BONE FEM CANC 6. [id number] NA [facility] [id number] Left 1 Implanted   SCR BNE ACET CANC PINN 6.5X35 -- SS - SNA  SCR BNE ACET CANC PINN 6.5X35 -- SS [facility] [id number] Left 1 Implanted   LINER ACET PINN NEUT 32X48 -- ALTRX - SN/A  LINER ACET PINN NEUT 32X48 -- ALTRX N/A [facility] [id number] Left 1 Implanted   CUP ACET SECTOR GRIPTION 48MM -- TI - SN/A  CUP ACET SECTOR GRIPTION 48MM -- TI N/A [facility] [id number] Left 1 Implanted   STEM FEM TAPR SZ4 STD OFFSET -- ACTIS - SNA  STEM FEM TAPR SZ4 STD OFFSET -- ACTIS [facility] [id number] Left 1 Implanted   HEAD FEM CER 32MM +1MM NK -- DELTA - SNA  HEAD FEM CER 32MM +1MM NK -- DELTA NA [facility] [id number] Left 1 Implanted       INDICATIONS: The patient is an 62 yrs female with progressive debilitating left hip and groin pain due to severe osteoarthritis. Symptoms have progressed despite comprehensive conservative treatment and they present for left total hip replacement. Risks include bleeding, infection, damage to the LFCN, leg length descrepency, blood clots, pulmonary embolism, and death. The patient understood these risks as well as potential benefits and alternatives and elected to proceed.      DESCRIPTION OF PROCEDURE:  Anesthetic was initiated. Preoperative dose of intravenous antibiotic was given within 30 minutes of incision. One gram of tranexamic acid was also administered intravenously. 2 grams of tranexamic acid with 0.4mL of epi topically at the end of the case. The left side was confirmed during a timeout and the hip was prepped and draped in the usual sterile fashion. Skin was covered with Ioban occlusive dressing. The patient underwent straight catheterization at the end of the case. Direct anterior exposure was made to the patient's hip through the sartorius tensor interval well lateral of the ASIS to protect the LFCN. Anterior hip vasculature including the ascending branches of the lateral circumflex artery were cauterized. Retractors were taken out to observe for bleeding and there was none. A T capsulotomy was made with bovie electrocautery. The Charnley retractor was repositioned for exposure. The femoral neck was osteotomized. Femoral head was removed from the acetabulum, which was exposed and soft tissues were removed. The acetabulum was progressively  reamed  and a 48 mm trial shell was impacted with good press-fit. This was removed and a 48 mm Maurice Gription shell was impacted in the acetabulum in 40 degrees of abduction in an anatomic-type anteversion. Bone spurs were removed and 6.5 screws x2 were placed. The polyethylene liner was placed. Femur was positioned and elevated from the wound. Appropriate soft tissue releases were made including the posterior capsule and obturator internus. The medullary canal was entered with a box osteotome. The femoral canal was broached to a size 4. Calcar planed and then trialed. A 32 mm , +1 hip ball was the most appropriate for leg length and tension with offset to best match native offset. The hip was dislocated. The trial was removed and the real stem was impacted. The real hip ball was placed. The hip was reduced.      After copious irrigation, the capsule was closed with #1 Stratafix barbed sutures. I irrigated the skin, subcutaneous and deep wound. I closed the fascia of the tensor fascia cayetano with #1 stratafix barbed sutures. Skin and subcutaneous were irrigated. Soft tissues were infiltrated with local anesthetic. 2 grams of tranexamic acid with 0.4 mL of epi given topically in the wound. Dilute betadine (17mL:1000mL of saline) was used to irrigate the wound followed by a rinse with the pulse lavage with normal saline. Skin and subcutaneous were closed in a standard fashion with 2-0 vicryl and 3-0 monocryl followed by Dermabond. An aquacel dressing was applied. Arely Meza PA-C was critical throughout the case to assist with positioning, retraction, instrument manipulation, and wound closure. Please note that no intern, resident, or other hospital surgical staff was available to assist during this procedure. There were no complications. No specimen was sent. The procedure was a LEFT TOTAL HIP REPLACEMENT using a Depuy total hip construct. The patient was transferred to the recovery room in stable condition. An AP pelvis xray was ordered to be completed in the PACU.      Abiel Yi MD

## 2020-07-08 NOTE — ROUTINE PROCESS
Patient: Anuradha Hairston MRN: [id number]  SSN: [id number]   YOB: 1955  Age: 62 y.o. Sex: female     Patient is status post Procedure(s):  LEFT TOTAL HIP ARTHROPLASTY  - ANTERIOR. Surgeon(s) and Role:     * Antonia Menezes MD - Primary    Local/Dose/Irrigation:  SEE MAR                  Peripheral IV 07/08/20 Left Arm (Active)   Site Assessment Clean, dry, & intact 7/8/2020  8:15 AM   Phlebitis Assessment 0 7/8/2020  8:15 AM   Infiltration Assessment 0 7/8/2020  8:15 AM   Dressing Status Clean, dry, & intact; New 7/8/2020  8:15 AM   Dressing Type Transparent;Tape 7/8/2020  8:15 AM   Hub Color/Line Status Green 7/8/2020  8:15 AM                           Dressing/Packing:  Wound Hip Left-Dressing Type: Aquacel; Topical skin adhesive/glue (07/08/20 0956)    Splint/Cast:  ]    Other:

## 2020-07-08 NOTE — PROGRESS NOTES
Ortho Post-op Note    7/8/2020  2:26 PM    POD:  Day of Surgery  S/P:  Procedure(s):  LEFT TOTAL HIP ARTHROPLASTY  - ANTERIOR    Afebrile/VSS, A&O x 3, mild distress  Doing well without complaints of nausea  Pain well controlled  Calves soft/NTTP Bilaterally  Thigh soft. Dressing clean and dry  Moving lower extremities well. Neurocirculatory exam intact and within normal range. Lab Results   Component Value Date/Time    HGB 12.3 06/19/2020 12:28 PM    INR 0.9 06/19/2020 12:28 PM     Recent Labs     06/19/20  1228 02/27/20  0528 02/26/20  2112 01/26/20  0948   CREA 0.56 0.78 0.71 0.71   BUN 9 12 10 11     Estimated Creatinine Clearance: 68.7 mL/min (by C-G formula based on SCr of 0.56 mg/dL).     PLAN:  DVT prophylaxis: ASA 81 mg bid  WBAT with PT-mobilization  Pain Control: tylenol, toradol, norco, tramadol   Plan to D/C  Home in 1-2 days       MEME Johnston

## 2020-07-08 NOTE — PROGRESS NOTES
TRANSFER - IN REPORT:    Verbal report received from Gladis Franklin RN(name) on Jose Miguel Sports  being received from [place]) for routine post - op      Report consisted of patients Situation, Background, Assessment and   Recommendations(SBAR). Information from the following report(s) SBAR, Kardex, OR Summary, Intake/Output, MAR and Accordion was reviewed with the receiving nurse. Opportunity for questions and clarification was provided. Assessment completed upon patients arrival to unit and care assumed.

## 2020-07-08 NOTE — ANESTHESIA PREPROCEDURE EVALUATION
Relevant Problems   No relevant active problems       Anesthetic History   No history of anesthetic complications            Review of Systems / Medical History  Patient summary reviewed, nursing notes reviewed and pertinent labs reviewed    Pulmonary  Within defined limits          Asthma        Neuro/Psych   Within defined limits           Cardiovascular  Within defined limits  Hypertension              Exercise tolerance: >4 METS     GI/Hepatic/Renal  Within defined limits              Endo/Other        Arthritis     Other Findings              Physical Exam    Airway  Mallampati: II  TM Distance: > 6 cm  Neck ROM: normal range of motion   Mouth opening: Normal     Cardiovascular  Regular rate and rhythm,  S1 and S2 normal,  no murmur, click, rub, or gallop             Dental    Dentition: Upper partial plate     Pulmonary  Breath sounds clear to auscultation               Abdominal  GI exam deferred       Other Findings            Anesthetic Plan    ASA: 2  Anesthesia type: spinal          Induction: Intravenous  Anesthetic plan and risks discussed with: Patient

## 2020-07-08 NOTE — PROGRESS NOTES
Primary Nurse Tien Guevara and Alexander RN performed a dual skin assessment on this patient No impairment noted  Mainor score is 21

## 2020-07-09 ENCOUNTER — HOME HEALTH ADMISSION (OUTPATIENT)
Dept: HOME HEALTH SERVICES | Facility: HOME HEALTH | Age: 65
End: 2020-07-09
Payer: COMMERCIAL

## 2020-07-09 VITALS
SYSTOLIC BLOOD PRESSURE: 135 MMHG | HEART RATE: 93 BPM | HEIGHT: 59 IN | DIASTOLIC BLOOD PRESSURE: 58 MMHG | BODY MASS INDEX: 26.21 KG/M2 | RESPIRATION RATE: 16 BRPM | WEIGHT: 130 LBS | OXYGEN SATURATION: 99 % | TEMPERATURE: 98.4 F

## 2020-07-09 LAB
ANION GAP SERPL CALC-SCNC: 5 MMOL/L (ref 5–15)
BUN SERPL-MCNC: 6 MG/DL (ref 6–20)
BUN/CREAT SERPL: 10 (ref 12–20)
CALCIUM SERPL-MCNC: 7.2 MG/DL (ref 8.5–10.1)
CHLORIDE SERPL-SCNC: 112 MMOL/L (ref 97–108)
CO2 SERPL-SCNC: 24 MMOL/L (ref 21–32)
CREAT SERPL-MCNC: 0.58 MG/DL (ref 0.55–1.02)
GLUCOSE SERPL-MCNC: 92 MG/DL (ref 65–100)
HGB BLD-MCNC: 9.4 G/DL (ref 11.5–16)
POTASSIUM SERPL-SCNC: 3.6 MMOL/L (ref 3.5–5.1)
SODIUM SERPL-SCNC: 141 MMOL/L (ref 136–145)

## 2020-07-09 PROCEDURE — 97116 GAIT TRAINING THERAPY: CPT

## 2020-07-09 PROCEDURE — 97165 OT EVAL LOW COMPLEX 30 MIN: CPT

## 2020-07-09 PROCEDURE — 74011250636 HC RX REV CODE- 250/636: Performed by: PHYSICIAN ASSISTANT

## 2020-07-09 PROCEDURE — 36415 COLL VENOUS BLD VENIPUNCTURE: CPT

## 2020-07-09 PROCEDURE — 80048 BASIC METABOLIC PNL TOTAL CA: CPT

## 2020-07-09 PROCEDURE — 74011250636 HC RX REV CODE- 250/636: Performed by: ORTHOPAEDIC SURGERY

## 2020-07-09 PROCEDURE — 74011000250 HC RX REV CODE- 250: Performed by: ORTHOPAEDIC SURGERY

## 2020-07-09 PROCEDURE — 74011250637 HC RX REV CODE- 250/637: Performed by: ORTHOPAEDIC SURGERY

## 2020-07-09 PROCEDURE — 85018 HEMOGLOBIN: CPT

## 2020-07-09 PROCEDURE — 97535 SELF CARE MNGMENT TRAINING: CPT

## 2020-07-09 PROCEDURE — 99218 HC RM OBSERVATION: CPT

## 2020-07-09 RX ORDER — GUAIFENESIN 100 MG/5ML
81 LIQUID (ML) ORAL 2 TIMES DAILY
Qty: 60 TAB | Refills: 0 | Status: SHIPPED | OUTPATIENT
Start: 2020-07-09 | End: 2020-08-12

## 2020-07-09 RX ORDER — TRAMADOL HYDROCHLORIDE 50 MG/1
50 TABLET ORAL
Qty: 60 TAB | Refills: 0 | Status: SHIPPED | OUTPATIENT
Start: 2020-07-09 | End: 2020-07-24

## 2020-07-09 RX ORDER — HYDROCODONE BITARTRATE AND ACETAMINOPHEN 5; 325 MG/1; MG/1
1 TABLET ORAL
Qty: 50 TAB | Refills: 0 | Status: SHIPPED | OUTPATIENT
Start: 2020-07-09 | End: 2020-07-18

## 2020-07-09 RX ADMIN — FAMOTIDINE 20 MG: 20 TABLET ORAL at 08:53

## 2020-07-09 RX ADMIN — KETOROLAC TROMETHAMINE 15 MG: 30 INJECTION, SOLUTION INTRAMUSCULAR at 07:19

## 2020-07-09 RX ADMIN — MONTELUKAST SODIUM 10 MG: 10 TABLET, FILM COATED ORAL at 08:52

## 2020-07-09 RX ADMIN — ACETAMINOPHEN 650 MG: 325 TABLET ORAL at 04:21

## 2020-07-09 RX ADMIN — HYDROCHLOROTHIAZIDE 12.5 MG: 25 TABLET ORAL at 08:53

## 2020-07-09 RX ADMIN — CEFAZOLIN SODIUM 2 G: 300 INJECTION, POWDER, LYOPHILIZED, FOR SOLUTION INTRAVENOUS at 00:36

## 2020-07-09 RX ADMIN — ATORVASTATIN CALCIUM 40 MG: 40 TABLET, FILM COATED ORAL at 08:52

## 2020-07-09 RX ADMIN — TRAMADOL HYDROCHLORIDE 50 MG: 50 TABLET, FILM COATED ORAL at 09:39

## 2020-07-09 RX ADMIN — KETOROLAC TROMETHAMINE 15 MG: 30 INJECTION, SOLUTION INTRAMUSCULAR at 00:36

## 2020-07-09 RX ADMIN — ASPIRIN 81 MG: 81 TABLET, COATED ORAL at 08:52

## 2020-07-09 RX ADMIN — TRAMADOL HYDROCHLORIDE 50 MG: 50 TABLET, FILM COATED ORAL at 04:21

## 2020-07-09 RX ADMIN — ACETAMINOPHEN 650 MG: 325 TABLET ORAL at 08:52

## 2020-07-09 NOTE — ANESTHESIA POSTPROCEDURE EVALUATION
Procedure(s):  LEFT TOTAL HIP ARTHROPLASTY  - ANTERIOR. spinal    Anesthesia Post Evaluation        Patient location during evaluation: PACU  Note status: Adequate. Level of consciousness: responsive to verbal stimuli and sleepy but conscious  Pain management: satisfactory to patient  Airway patency: patent  Anesthetic complications: no  Cardiovascular status: acceptable  Respiratory status: acceptable  Hydration status: acceptable  Comments: +Post-Anesthesia Evaluation and Assessment    Patient: Khadra Bhatt MRN: [id number]  SSN: [id number]   YOB: 1955  Age: 62 y.o. Sex: female          Cardiovascular Function/Vital Signs    /58 (BP 1 Location: Right arm, BP Patient Position: Standing)   Pulse 93   Temp 36.9 °C (98.4 °F)   Resp 16   Ht 4' 11\" (1.499 m)   Wt 59 kg (130 lb)   SpO2 99%   BMI 26.26 kg/m²     Patient is status post Procedure(s):  LEFT TOTAL HIP ARTHROPLASTY  - ANTERIOR. Nausea/Vomiting: Controlled. Postoperative hydration reviewed and adequate. Pain:  Pain Scale 1: Numeric (0 - 10) (07/09/20 0421)  Pain Intensity 1: 7 (07/09/20 0421)   Managed. Neurological Status:   Neuro (WDL): Within Defined Limits (07/08/20 1145)   At baseline. Mental Status and Level of Consciousness: Arousable. Pulmonary Status:   O2 Device: Room air (07/08/20 2042)   Adequate oxygenation and airway patent. Complications related to anesthesia: None    Post-anesthesia assessment completed. No concerns. I have evaluated the patient and the patient is stable and ready to be discharged from PACU . Signed By: Tesha Fregoso MD    7/9/2020        INITIAL Post-op Vital signs:   Vitals Value Taken Time   /58 7/8/2020  1:17 PM   Temp 36.1 °C (97 °F) 7/8/2020 11:45 AM   Pulse 80 7/8/2020  1:26 PM   Resp 12 7/8/2020  1:26 PM   SpO2 96 % 7/8/2020  1:26 PM   Vitals shown include unvalidated device data.

## 2020-07-09 NOTE — PROGRESS NOTES
Bedside and Verbal shift change report given to MOUSTAPHA Elam (oncoming nurse) by Mimi Soto RN (offgoing nurse). Report included the following information SBAR, Kardex, OR Summary, Procedure Summary, Intake/Output, MAR and Recent Results.

## 2020-07-09 NOTE — PROGRESS NOTES
OCCUPATIONAL THERAPY EVALUATION/DISCHARGE  Patient: Lindsay Castelan (48 y.o. female)  Date: 7/9/2020  Primary Diagnosis: Osteoarthritis of left hip, unspecified osteoarthritis type [M16.12]  Procedure(s) (LRB):  LEFT TOTAL HIP ARTHROPLASTY  - ANTERIOR (Left) 1 Day Post-Op   Precautions: Total hip, WBAT, Fall    ASSESSMENT  Based on the objective data described below, the patient presents with decreased independence with ADLs due to pain in the L hip following a L total CURTIS. Pt is supervision level with almost all ADLs with the exception of min A for LB bathing. Pt provided with education regarding hip precautions, body mechanics with IADLs, safety with ADLs and ambulating with walker, energy conservation strategies, and use of AE. Recommend d/c home with family support. Current Level of Function (ADLs/self-care): supervision (min A for LB bathing)    Functional Outcome Measure: The patient scored 55 on the Barthel Index outcome measure which is indicative of 45% decreased independence with ADLs. Other factors to consider for discharge: family support from son and son's fiance, awareness of deficits     PLAN :  Recommend with staff: OOB TID for meals with assistance. Use walker in bathroom for toileting. Recommendation for discharge: (in order for the patient to meet his/her long term goals)  No skilled occupational therapy/ follow up rehabilitation needs identified at this time. This discharge recommendation:  Has not yet been discussed the attending provider and/or case management    IF patient discharges home will need the following DME: hip kit and provided education for how to purchase for home       SUBJECTIVE:   Patient stated Sedrick Mcburney are so many gadgets.     OBJECTIVE DATA SUMMARY:   HISTORY:   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    Cyst of left kidney 01/26/2020    Hypercholesterolemia 2001    Hypertension 2001    Non-seasonal allergic rhinitis 6-2011    dr Benjamin Rivers    Osteoporosis 2006     Past Surgical History:   Procedure Laterality Date    COLONOSCOPY N/A 11/17/2017    COLONOSCOPY performed by Keshawn Bunch MD at [facility]. [address], DIAGNOSTIC  2007    due 2017    HX GYN  1974    OVARIAN CYST REMOVED    HX ORTHOPAEDIC      R hip replacement    NATALIE BIOPSY BREAST STEREOTACTIC Right 2010    benign    NEUROLOGICAL PROCEDURE UNLISTED  2020    TRIGGER FINGER, CYST REMOVAL, TENDON RELEASE       Prior Level of Function/Environment/Context: Independent; working full time for [employer] at [employer] or extensive additional review of patient history:   Home Situation  Home Environment: Private residence  # Steps to Enter: 6  Rails to Enter: Yes  [employer] : Right  Wheelchair Ramp: No  One/Two Story Residence: One story  Living Alone: Yes  Support Systems: Child(john)  Patient Expects to be Discharged to[de-identified] Private residence  Current DME Used/Available at Home: Shower chair, Walker, rolling  Tub or Shower Type: Shower    Hand dominance: Right    EXAMINATION OF PERFORMANCE DEFICITS:  Cognitive/Behavioral Status:  Neurologic State: Alert  Orientation Level: Oriented X4;Appropriate for age  Cognition: Appropriate decision making; Appropriate for age attention/concentration; Appropriate safety awareness; Follows commands  Perception: Appears intact  Perseveration: No perseveration noted  Safety/Judgement: Awareness of environment;Good awareness of safety precautions; Insight into deficits    Skin: None noted     Edema: None noted     Hearing: Auditory  Auditory Impairment: None    Vision/Perceptual:                                     Range of Motion:    AROM: Within functional limits  PROM: Within functional limits                      Strength:    Strength:  Within functional limits                Coordination:  Coordination: Within functional limits  Fine Motor Skills-Upper: Left Intact; Right Intact    Gross Motor Skills-Upper: Left Intact; Right Intact    Tone & Sensation:    Tone: Normal  Sensation: Intact                      Balance:  Sitting: Intact  Standing: Intact; With support    Functional Mobility and Transfers for ADLs:  Bed Mobility:  Supine to Sit: Supervision  Scooting: Supervision    Transfers:  Sit to Stand: Supervision  Stand to Sit: Supervision  Bed to Chair: Supervision  Bathroom Mobility: Supervision/set up  Toilet Transfer : Supervision    ADL Assessment:  Feeding: Supervision    Oral Facial Hygiene/Grooming: Supervision    Bathing: Minimum assistance    Upper Body Dressing: Supervision    Lower Body Dressing: Supervision    Toileting: Supervision                ADL Intervention and task modifications:   Pt received lying supine in bed. Pt agreeable to getting up and getting dressed. Pt went from supine to sit with supervision and some verbal and tactile cues. Pt performed sit to stand transfer with supervision. Pt used walker to walk from EOB to bathroom. Pt completed toileting and hand-washing standing at sink with supervision. Pt provided with education on bed mobility and safety when using walker. Pt then ambulated from bathroom to chair and sat down with supervision and verbal cues. Pt completed UB dressing and LB dressing with supervision. Pt provided with education on use of adaptive equipment, energy conservation strategies, and safety when performing ADLs. Pt also provided with education on body mechanics and safety when completing IADLs at home. Pt receptive to education and pleasant throughout eval.                              Cognitive Retraining  Safety/Judgement: Awareness of environment;Good awareness of safety precautions; Insight into deficits    Occupational Therapy Evaluation Charge Determination   History Examination Decision-Making   LOW Complexity : Brief history review  LOW Complexity : 1-3 performance deficits relating to physical, cognitive , or psychosocial skils that result in activity limitations and / or participation restrictions  LOW Complexity : No comorbidities that affect functional and no verbal or physical assistance needed to complete eval tasks       Based on the above components, the patient evaluation is determined to be of the following complexity level: LOW   Pain Rating:  Minimal pain    Activity Tolerance:   Good  Please refer to the flowsheet for vital signs taken during this treatment. After treatment patient left in no apparent distress:    Sitting in chair and Call bell within reach    COMMUNICATION/EDUCATION:   The patients plan of care was discussed with: Physical therapist.     Thank you for this referral.  Sylwia Cook  Time Calculation: 42 mins    Regarding student involvement in patient care:  A student participated in this treatment session. Per CMS Medicare statements and AOTA guidelines I certify that the following was true:  1. I was present and directly observed the entire session. 2. I made all skilled judgments and clinical decisions regarding care. 3. I am the practitioner responsible for assessment, treatment, and documentation.     Kylee Mcghee OT

## 2020-07-09 NOTE — PROGRESS NOTES
Problem: Mobility Impaired (Adult and Pediatric)  Goal: *Acute Goals and Plan of Care (Insert Text)  Description: FUNCTIONAL STATUS PRIOR TO ADMISSION: Patient was independent and active without use of DME.    HOME SUPPORT PRIOR TO ADMISSION: The patient lived alone with son & son's significant other to provide assistance. Physical Therapy Goals  Initiated 7/8/2020    1. Patient will move from supine to sit and sit to supine , scoot up and down and roll side to side in bed with modified independence within 4 days. 2. Patient will perform sit to stand with modified independence within 4 days. 3. Patient will ambulate with modified independence for 150 feet with the least restrictive device within 4 days. 4. Patient will ascend/descend 4 stairs with cane and one handrail(s) with modified independence within 4 days. 5. Patient will perform post-CURTIS home exercise program per protocol with independence within 4 days. Outcome: Resolved/Met   PHYSICAL THERAPY TREATMENT/DISCHARGE  Patient: Nick Dunn (64 y.o. female)  Date: 7/9/2020  Diagnosis: Osteoarthritis of left hip, unspecified osteoarthritis type [M16.12]   <principal problem not specified>  Procedure(s) (LRB):  LEFT TOTAL HIP ARTHROPLASTY  - ANTERIOR (Left) 1 Day Post-Op  Precautions: Total hip, WBAT, Fall  Chart, physical therapy assessment, plan of care and goals were reviewed. ASSESSMENT  Patient continues with skilled PT services and has met acute care PT goals. Patient is cleared for discharge from PT standpoint. Patient  is independent with post-op CURTIS exercise protocol and has same in written, illlustrated form. Educated patient on Discharge Instructions relating to PT program progression post-discharge. She demonstrated/verbalized understanding. Barthel functional score has improved to 90/100, indicating minimal impaired ability to care for basic self-needs/dependency on others.        Other factors to consider for discharge: Motivated/A & O x 4/Supportive Family/Independent PLOF          PLAN :  Patient will be discharged from acute skilled physical therapy at this time. Rationale for discharge:  Goals achieved    Recommendation for discharge: (in order for the patient to meet his/her long term goals)  Physical therapy at least 2 days/week in the home     This discharge recommendation:  Has been made in collaboration with the attending provider and/or case management    IF patient discharges home will need the following DME: patient owns DME required for discharge       SUBJECTIVE:   Patient stated I am ready to go home.     OBJECTIVE DATA SUMMARY:   Critical Behavior:  Neurologic State: Alert  Orientation Level: Oriented X4, Appropriate for age  Cognition: Appropriate decision making, Appropriate for age attention/concentration, Appropriate safety awareness, Follows commands  Safety/Judgement: Awareness of environment, Good awareness of safety precautions, Insight into deficits  Functional Mobility Training:  Bed Mobility:     Supine to Sit: Modified independent  Sit to Supine: Modified independent  Scooting: Modified independent        Transfers:  Sit to Stand: Modified independent  Stand to Sit: Modified independent        Bed to Chair: Modified independent                    Balance:  Sitting: Intact  Standing: Intact; With support  Ambulation/Gait Training:  Distance (ft): 150 Feet (ft)  Assistive Device: Walker, rolling;Gait belt  Ambulation - Level of Assistance: Modified independent        Gait Abnormalities: Antalgic     Left Side Weight Bearing: As tolerated  Base of Support: Shift to right  Stance: Left decreased  Speed/Janie: Slow  Step Length: Right shortened  Swing Pattern: Left asymmetrical       Stairs:  Number of Stairs Trained: 4  Stairs - Level of Assistance: Supervision   Rail Use: Left (one rail and cane)    Functional Measure:  Barthel Index:    Bathin  Bladder: 10  Bowels: 10  Groomin  Dressing: 5  Feeding: 10  Mobility: 15  Stairs: 10  Toilet Use: 10  Transfer (Bed to Chair and Back): 15  Total: 90/100       The Barthel ADL Index: Guidelines  1. The index should be used as a record of what a patient does, not as a record of what a patient could do. 2. The main aim is to establish degree of independence from any help, physical or verbal, however minor and for whatever reason. 3. The need for supervision renders the patient not independent. 4. A patient's performance should be established using the best available evidence. Asking the patient, friends/relatives and nurses are the usual sources, but direct observation and common sense are also important. However direct testing is not needed. 5. Usually the patient's performance over the preceding 24-48 hours is important, but occasionally longer periods will be relevant. 6. Middle categories imply that the patient supplies over 50 per cent of the effort. 7. Use of aids to be independent is allowed. Ophelia Cortez., Barthel, D.W. (0087). Functional evaluation: the Barthel Index. [address] [facility] (14)2. Conrad Chappell bonnie LADAN Morales, Macario Alcantara., [facility]., [facility], [address] [place] [address] (1999). Measuring the change indisability after inpatient rehabilitation; comparison of the responsiveness of the Barthel Index and Functional [place] Measure. Journal of Neurology, Neurosurgery, and Psychiatry, 66(4), 459-228. NICOLE Vela, ESTUARDO Carver, & Timothy Vasquez MEmeraldA. (2004.) Assessment of post-stroke quality of life in cost-effectiveness studies: The usefulness of the Barthel Index and the EuroQoL-5D. Quality of Life Research, 13, 907-64       Therapeutic Exercises:   Patient  is independent with post-op CURTIS exercise protocol and has same in written, illlustrated form.       Pain Rating:  3/10    Activity Tolerance:   Good      After treatment patient left in no apparent distress:   Supine in bed, Call bell within reach, Side rails x 3, and nurse notified. COMMUNICATION/COLLABORATION:   The patients plan of care was discussed with: Registered nurse, Physician, and Case management.      Anthony Casas   Time Calculation: 30 mins

## 2020-07-09 NOTE — PROGRESS NOTES
Up in bathroom brushing teeth at time of visit. Standing without difficulty. Some increased pain today but overall doing well. Looking forward to working with PT>       GEN:  NAD.  AOx3   ABD:  S/NT/ND   LLE:  Dressing C/D/I , 5/5 motor, Calf nttp (Bilat), Sensation grossly intact to light touch throughout, 1+ dp/pt pulses, foot perfused    Patient Vitals for the past 24 hrs:   Temp Pulse Resp BP SpO2   07/09/20 0851 98.4 °F (36.9 °C) 96 16 124/76 99 %   07/09/20 0335 98.1 °F (36.7 °C) 91 16 106/68 100 %   07/08/20 2042 98.6 °F (37 °C) 85 16 110/63 100 %   07/08/20 1639 98.3 °F (36.8 °C) 87 16 121/66 99 %   07/08/20 1549 -- 96 -- 138/78 --   07/08/20 1540 98.1 °F (36.7 °C) 91 17 116/65 96 %   07/08/20 1450 98.1 °F (36.7 °C) 93 16 141/77 95 %   07/08/20 1345 97.8 °F (36.6 °C) 88 14 122/73 94 %   07/08/20 1325 -- 82 12 -- 97 %   07/08/20 1320 -- 81 11 -- 96 %   07/08/20 1315 -- 81 12 99/54 95 %   07/08/20 1310 -- 81 11 -- 97 %   07/08/20 1305 -- 82 15 -- 96 %   07/08/20 1300 -- 81 12 107/56 96 %   07/08/20 1255 -- 79 12 -- 96 %   07/08/20 1250 -- 81 10 -- 95 %   07/08/20 1245 -- 79 12 99/59 95 %   07/08/20 1240 -- 84 15 -- 94 %   07/08/20 1235 -- 81 13 -- 95 %   07/08/20 1230 -- 82 11 98/61 93 %   07/08/20 1225 -- 81 13 -- 96 %   07/08/20 1220 -- 87 13 -- 96 %   07/08/20 1215 -- 90 13 108/61 95 %   07/08/20 1210 -- 84 12 -- 93 %   07/08/20 1205 -- 85 12 -- 94 %   07/08/20 1200 -- 83 12 97/56 94 %   07/08/20 1155 -- 79 13 94/59 100 %   07/08/20 1150 -- 89 14 108/61 100 %   07/08/20 1145 97 °F (36.1 °C) 83 11 98/60 100 %   07/08/20 1140 -- 84 12 103/60 99 %   07/08/20 1135 -- 86 12 100/57 99 %   07/08/20 1130 -- 87 11 97/57 99 %   07/08/20 1125 -- 85 13 99/53 97 %   07/08/20 1120 -- 84 13 99/55 96 %   07/08/20 1115 -- 84 13 99/55 97 %   07/08/20 1110 -- 83 13 105/49 97 %   07/08/20 1105 -- 86 13 99/55 98 %   07/08/20 1100 -- 89 13 110/55 99 %   07/08/20 1055 -- 77 13 123/63 99 %   07/08/20 1050 -- 73 14 144/65 97 % 07/08/20 1049 -- 74 13 -- 98 %   07/08/20 1048 -- 91 13 -- 98 %   07/08/20 1047 -- 85 15 103/59 97 %   07/08/20 1046 -- 86 11 -- 97 %   07/08/20 1045 -- 85 14 (!) 88/52 97 %   07/08/20 1044 -- 87 11 (!) 81/50 98 %   07/08/20 1043 -- 87 13 -- 99 %   07/08/20 1042 -- 92 20 -- 97 %   07/08/20 1041 (!) 96 °F (35.6 °C) 94 13 (!) 82/51 96 %   07/08/20 1040 -- -- -- -- 96 %       Current Facility-Administered Medications   Medication Dose Route Frequency    atorvastatin (LIPITOR) tablet 40 mg  40 mg Oral DAILY    hydroCHLOROthiazide (HYDRODIURIL) tablet 12.5 mg  12.5 mg Oral DAILY    losartan (COZAAR) tablet 100 mg  100 mg Oral DAILY    montelukast (SINGULAIR) tablet 10 mg  10 mg Oral DAILY    0.9% sodium chloride infusion  125 mL/hr IntraVENous CONTINUOUS    sodium chloride 0.9 % bolus infusion 500 mL  500 mL IntraVENous ONCE PRN    sodium chloride (NS) flush 5-40 mL  5-40 mL IntraVENous Q8H    sodium chloride (NS) flush 5-40 mL  5-40 mL IntraVENous PRN    acetaminophen (TYLENOL) tablet 650 mg  650 mg Oral Q6H    HYDROmorphone (PF) (DILAUDID) injection 0.5 mg  0.5 mg IntraVENous Q4H PRN    naloxone (NARCAN) injection 0.4 mg  0.4 mg IntraVENous PRN    hydrOXYzine HCL (ATARAX) tablet 10 mg  10 mg Oral Q8H PRN    famotidine (PEPCID) tablet 20 mg  20 mg Oral BID    senna-docusate (PERICOLACE) 8.6-50 mg per tablet 1 Tab  1 Tab Oral BID    polyethylene glycol (MIRALAX) packet 17 g  17 g Oral DAILY    bisacodyL (DULCOLAX) suppository 10 mg  10 mg Rectal DAILY PRN    aspirin delayed-release tablet 81 mg  81 mg Oral BID    ondansetron (ZOFRAN) injection 4 mg  4 mg IntraVENous Q4H PRN    traMADoL (ULTRAM) tablet 50 mg  50 mg Oral Q6H PRN    ketorolac (TORADOL) injection 15 mg  15 mg IntraVENous Q6H    HYDROcodone-acetaminophen (NORCO) 5-325 mg per tablet 1 Tab  1 Tab Oral Q4H PRN       Lab Results   Component Value Date/Time    HGB 9.4 (L) 07/09/2020 04:27 AM    INR 0.9 06/19/2020 12:28 PM       Lab Results Component Value Date/Time    Sodium 141 07/09/2020 04:27 AM    Potassium 3.6 07/09/2020 04:27 AM    Chloride 112 (H) 07/09/2020 04:27 AM    CO2 24 07/09/2020 04:27 AM    BUN 6 07/09/2020 04:27 AM    Creatinine 0.58 07/09/2020 04:27 AM    Calcium 7.2 (L) 07/09/2020 04:27 AM            62 y.o. female s/p left direct anterior total hip arthroplasty on 7/8/2020  . Doing well. ** post op meds sent to pharmacy on file.      Precautions: None  ABX: Complete 24 hours perioperative abx  PATHWAY: Straight cath per protocol  DVT Prophylaxis: ASA 81 mg enteric coated BID  Weight Bearing: WBAT LLE   Pain Control: Toradol, Tylenol, 15 mg meloxicam to begin evening POD 1 if patient still in hospital, tramadol every 6 hours, oxy 5 mg for breakthrough pain  Disposition: Home, [facility]

## 2020-07-09 NOTE — PROGRESS NOTES
JOANA    Reason for Admission: OA Lt hip, DJD Lt hip, S/p total hip arthroscopy 7/8/2020                     RUR Score:  NA                   Plan for utilizing home health:  Pt has a preference to use [facility]. CM sent a referral via [address] [place] [address]        PCP: First and Last name: Josh Watts MD    Name of Practice:    Are you a current patient: Yes/No: Yes   Approximate date of last visit: June, 2020   Can you participate in a virtual visit with your PCP: Yes                    Current Advanced Directive/Advance Care Plan: Pt does not have an AMD. Her son, Alphonso Oswald, [phone], is NOK. Transition of Care Plan:  Pt will discharge home with HH/ PT. She will f/u with Orthopedics. Care Management Interventions  PCP Verified by CM: Yes(Chi Childress MD, last office visit was 1 month ago)  Palliative Care Criteria Met (RRAT>21 & CHF Dx)?: No  Mode of Transport at Discharge: Other (see comment)(family)  Transition of Care Consult (CM Consult): [address]: Yes  Physical Therapy Consult: Yes  Occupational Therapy Consult: Yes  Current Support Network: Lives Alone  Confirm Follow Up Transport: Family  The Patient and/or Patient Representative was Provided with a Choice of Provider and Agrees with the Discharge Plan?: Yes       The Plan for Transition of Care is related to the following treatment goals: As above    The Patient  was provided with a choice of provider and agrees   with the discharge plan. [x] Yes [] No    Freedom of choice list was provided with basic dialogue that supports the patient's individualized plan of care/goals, treatment preferences and shares the quality data associated with the providers. [x] Yes [] No       [facility] accepted to follow pt. CM will put information on AVS.    Observation notice provided in writing to patient and/or caregiver as well as verbal explanation of the policy.   Patients who are in outpatient status also receive the Observation notice.        Chad Ames RN

## 2020-07-09 NOTE — PROGRESS NOTES
Bedside and Verbal shift change report given to Sharita Jennings RN (oncoming nurse) by Nayeli Lara RN (offgoing nurse). Report included the following information SBAR, Kardex, Intake/Output, MAR and Accordion.

## 2020-07-10 ENCOUNTER — HOME CARE VISIT (OUTPATIENT)
Dept: HOME HEALTH SERVICES | Facility: HOME HEALTH | Age: 65
End: 2020-07-10
Payer: COMMERCIAL

## 2020-07-10 ENCOUNTER — PATIENT OUTREACH (OUTPATIENT)
Dept: OTHER | Age: 65
End: 2020-07-10

## 2020-07-10 ENCOUNTER — HOME CARE VISIT (OUTPATIENT)
Dept: SCHEDULING | Facility: HOME HEALTH | Age: 65
End: 2020-07-10
Payer: COMMERCIAL

## 2020-07-10 VITALS
HEART RATE: 101 BPM | WEIGHT: 130.07 LBS | SYSTOLIC BLOOD PRESSURE: 116 MMHG | HEIGHT: 59 IN | BODY MASS INDEX: 26.22 KG/M2 | OXYGEN SATURATION: 98 % | TEMPERATURE: 98.7 F | DIASTOLIC BLOOD PRESSURE: 64 MMHG | RESPIRATION RATE: 16 BRPM

## 2020-07-10 PROCEDURE — 400013 HH SOC

## 2020-07-10 PROCEDURE — G0151 HHCP-SERV OF PT,EA 15 MIN: HCPCS

## 2020-07-10 NOTE — PROGRESS NOTES
Patient eligible for [facility] Employee care management. Received notification of outpatient surgery at [facility] on 20 for AVN of left hip with CURTIS. Care Manager contacted the patient, verified  and zip code as identifiers. Provided introduction and explanation of the Nurse Care Manager role. Agreed to CM follow-up and assistance at this time. Care management discharge assessment completed. PMH:   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    Cyst of left kidney 2020    Hypercholesterolemia 2001    Hypertension     Non-seasonal allergic rhinitis     dr Wisdom Bolus Osteoporosis          61 yo patient who is 2 day(s) s/p left CURTIS. Spoke with patient who reports, she is doing ok. States pain level at 10/10 right now due to just finished with HH. States she is taking pain med as directed with fair relief. States ambulating with walker with no problems. Has family in home to assist as needed. Denies any signs of infection or other complications. Tolerating food and drink with no nausea or vomiting. Urinating without problem, states no BM but passing gas. States MD instructed on steps if no BM. Reviewed Red Flag Symptoms and discharge orders. Patient verbalized understanding. Scheduled for follow up with surgeon in 4 weeks. Surgical/Wound Condition Focused Assessment    Skin- wounds or incisions? yes  Description and location of wound-   Left hip    In the last 24 hour have you experienced;     Fever no    Low body temperature no    Chills or shaking no    Sweating no    Fast heart rate no    Fast breathing no    Dizziness/lightheadedness no    Confusion or unusual change in mental status no    Diarrhea no    Nausea no    Vomiting no    Shortness of breath or difficulty breathing no    Less urine output no    Cold, clammy, and pale skin no     Skin rash or skin color changes no  New or worsening pain? yes  If yes, pain rated 0-10: 10   Location/pain characteristics: left hip pain worse due to just finished PT   New or worsening numbness or tingling? no  If yes, location of numbness and tingling: n/a    Activity level- moving several times a day, or as recommended? yes  Activity restrictions postop:  reviewed     Bathing and showering instructions? yes  Nutrition- prescribed diet? no   Tolerating food? yes    Does patient have incentive spirometer? no  If yes, how frequently is patient using incentive spirometer? n/a     Postop Red Flags  Call you doctor now or seek immediate medical attention if:  · You pass out, lose consciousness;  · You have severe trouble breathing;  · You have sudden chest pain and shortness of breath;  · You cough up blood;  · You have severe belly pain;  · You are sick to your stomach and cannot hold down fluids. · You have trouble passing urine;  · You have pain that does not get better after taking your pain medications  · Your foot is cool or pale or changes color. · You have tingling, weakness, or numbness in your leg  · You have loose stitches, or your incision comes open. · You have signs of a blood clot such as:  · Pain in your calf, back of the knee, or groin;  · Redness and swelling in your leg or groin;  · You develop signs of an infection:  · Increased pain, swelling or redness at wound site;  · Pus draining from the wound site or on dressing;  · A fever;  · Notice a foul odor from the wound; Initial Plan of Care as discussed and agreed with patient:  Impaired Mobility                     Goal:  Demonstrate behaviors that enable safe resumption of ADLs without falls;  · States using walker for ambulation in home without difficulty  · States no barriers in home for using walker safely  · Family in home to assist as needed  · Performing HEP as directed   · Reports HH PT started 7/10/20;      Acute Pain                      Goal:  Verbalize pain control with prescribed medications to level of 5/10   · Pain level 10/10 due to just completed PT  · States taking Norco, Tramadol and Tylenol as directed with acceptable control  · Using ice as directed and elevating leg to help with pain control        Impaired Skin Integrity     Goal:  Demonstrates no signs of infection postop  · Incision covered and dressing to be removed by [facility] in one week   Keep dressing dry and clean;   Remove dressing that becomes wet, soiled, or has drainage;   Review red flags with patient and to notify the surgeon immediately  o Fever >100;  o Increased incision drainage or oozing;  o Increased redness or swelling around incision site;  o Decreased sensation and motor activity in extremities; Worsening pain;                                                                                                                 Review and discussion of initial plan of care with patient, who has provided input to plan, verbalized understanding and agrees with current goals. Potential Barriers to Self-Management or Access:   []  Decline in memory    []  Language barrier  []  Emotional   [x]  Limited mobility  []  Lack of motivation     [] Vision, hearing or cognitive impairment  []  Knowledge    [] Financial Barriers    [x]  Pain    []  Other     Discharge Instructions:  Reviewed discharge instructions with patient. Patient verbalizes understanding of discharge instructions and importance of follow-up care. Medications:   New Medications at Discharge: Laine Loco ASA  Changed Medications at Discharge: no  Discontinued Medications at Discharge: no    Current Outpatient Medications   Medication Sig    aspirin 81 mg chewable tablet Take 1 Tab by mouth two (2) times a day.  traMADoL (ULTRAM) 50 mg tablet Take 1 Tab by mouth every six (6) hours as needed for Pain for up to 15 days.  Max Daily Amount: 200 mg.    HYDROcodone-acetaminophen (NORCO) 5-325 mg per tablet Take 1 Tab by mouth every four (4) hours as needed for Pain for up to 9 days. Max Daily Amount: 6 Tabs.  montelukast (Singulair) 10 mg tablet Take 10 mg by mouth daily.  cholecalciferol (VITAMIN D3) 1,000 unit cap Take 1,000 Units by mouth daily.  atorvastatin (LIPITOR) 40 mg tablet Take 40 mg by mouth daily.  albuterol (PROVENTIL HFA, VENTOLIN HFA, PROAIR HFA) 90 mcg/actuation inhaler Take 2 puffs by inhalation every six (6) hours as needed for Wheezing.  Hydrochlorothiazide (HYDRODIURIL) 12.5 mg tablet Take 1 tablet by mouth daily. Needs to schedule ov before further refills. Indications: HYPERTENSION    irbesartan (AVAPRO) 300 mg tablet Take 1 Tab by mouth daily. No current facility-administered medications for this visit. Completed a review of medications with patient, who verbalized understanding of how and when to take medications. Barriers to Medication Adherence:  none    Follow up with orthopedic surgeon 4 weeks    Patient verbalized understanding of all information discussed. Pt has my contact information for any further questions, concerns, or needs.     Plan for next call:  5 to 7 days

## 2020-07-12 ENCOUNTER — HOME CARE VISIT (OUTPATIENT)
Dept: HOME HEALTH SERVICES | Facility: HOME HEALTH | Age: 65
End: 2020-07-12
Payer: COMMERCIAL

## 2020-07-13 ENCOUNTER — HOME CARE VISIT (OUTPATIENT)
Dept: SCHEDULING | Facility: HOME HEALTH | Age: 65
End: 2020-07-13
Payer: COMMERCIAL

## 2020-07-13 VITALS
TEMPERATURE: 98.4 F | RESPIRATION RATE: 18 BRPM | DIASTOLIC BLOOD PRESSURE: 60 MMHG | OXYGEN SATURATION: 98 % | SYSTOLIC BLOOD PRESSURE: 120 MMHG | HEART RATE: 100 BPM

## 2020-07-13 PROCEDURE — G0151 HHCP-SERV OF PT,EA 15 MIN: HCPCS

## 2020-07-14 ENCOUNTER — HOME CARE VISIT (OUTPATIENT)
Dept: SCHEDULING | Facility: HOME HEALTH | Age: 65
End: 2020-07-14
Payer: COMMERCIAL

## 2020-07-14 ENCOUNTER — HOME CARE VISIT (OUTPATIENT)
Dept: HOME HEALTH SERVICES | Facility: HOME HEALTH | Age: 65
End: 2020-07-14
Payer: COMMERCIAL

## 2020-07-14 VITALS
RESPIRATION RATE: 18 BRPM | TEMPERATURE: 98.4 F | OXYGEN SATURATION: 98 % | SYSTOLIC BLOOD PRESSURE: 115 MMHG | HEART RATE: 92 BPM | DIASTOLIC BLOOD PRESSURE: 60 MMHG

## 2020-07-14 PROCEDURE — G0151 HHCP-SERV OF PT,EA 15 MIN: HCPCS

## 2020-07-16 ENCOUNTER — HOME CARE VISIT (OUTPATIENT)
Dept: SCHEDULING | Facility: HOME HEALTH | Age: 65
End: 2020-07-16
Payer: COMMERCIAL

## 2020-07-16 ENCOUNTER — PATIENT OUTREACH (OUTPATIENT)
Dept: OTHER | Age: 65
End: 2020-07-16

## 2020-07-16 ENCOUNTER — HOME CARE VISIT (OUTPATIENT)
Dept: HOME HEALTH SERVICES | Facility: HOME HEALTH | Age: 65
End: 2020-07-16
Payer: COMMERCIAL

## 2020-07-16 VITALS
TEMPERATURE: 98.5 F | RESPIRATION RATE: 18 BRPM | OXYGEN SATURATION: 98 % | HEART RATE: 88 BPM | SYSTOLIC BLOOD PRESSURE: 120 MMHG | DIASTOLIC BLOOD PRESSURE: 60 MMHG

## 2020-07-16 PROCEDURE — G0151 HHCP-SERV OF PT,EA 15 MIN: HCPCS

## 2020-07-16 NOTE — PROGRESS NOTES
Care Manager contacted the patient by telephone in follow up. Verified  and zip code with patient as identifiers. CM spoke briefly with patient who reports she is doing well. HH PT in home now for visit. States makeing slow but definite progress. Denies any signs of infection or other complication. Reviewed Red Flag Symptoms, patient verbalized understanding. Will plan follow up in 10 to 14 days. Assessment of clinical changes and knowledge demonstrated since last call:   Ongoing Plan of Care:     Impaired Mobility                     Goal:  Demonstrate behaviors that enable safe resumption of ADLs without falls;  · 20 Participating in [place] PT and HEP, states making slow but definite progress  · States using walker for ambulation in home without difficulty  · States no barriers in home for using walker safely  · Family in home to assist as needed  · Performing HEP as directed   · Reports HH PT started 7/10/20;     Acute Pain                      Goal:  Verbalize pain control with prescribed medications to level of 5/10   · 20 Rates pain at 5-6/10, acceptable pain control  · Pain level 10/10 due to just completed PT  · States taking Norco, Tramadol and Tylenol as directed with acceptable control  · Using ice as directed and elevating leg to help with pain control           Impaired Skin Integrity     Goal:  Demonstrates no signs of infection postop  20 Incision healing well, no signs of infection  · Incision covered and dressing to be removed by [place] in one week  · Keep dressing dry and clean;  · Remove dressing that becomes wet, soiled, or has drainage;  · Review red flags with patient and to notify the surgeon immediately  ? Fever >100;  ? Increased incision drainage or oozing;  ? Increased redness or swelling around incision site;  ?  Decreased sensation and motor activity in extremities; Worsening pain;                                                                                                                Review and discussion of plan of care with patient, who has provided input to plan, verbalized understanding and agrees with current goals. Any recurrence Red Flags or continued symptoms:none     Medication Regimen Change:none  Completed a review of medications with patient, who verbalized understanding of how and when to take medications. Barriers / Adherence with medications:none    Upcoming Appointments:    Future Appointments   Date Time Provider Josafat Garnica   7/20/2020 12:00 PM [place], [address] [place]   7/23/2020 To Be Determined Jasmyne Bohr [address]       Patient asked questions appropriately and denied any additional needs at this time. Patient verbalized understanding of all information discussed. Patient has my name and contact information for any follow up needs or questions.      Plan next call: 10 to 14 days

## 2020-07-20 ENCOUNTER — HOME CARE VISIT (OUTPATIENT)
Dept: SCHEDULING | Facility: HOME HEALTH | Age: 65
End: 2020-07-20
Payer: COMMERCIAL

## 2020-07-20 ENCOUNTER — HOME CARE VISIT (OUTPATIENT)
Dept: HOME HEALTH SERVICES | Facility: HOME HEALTH | Age: 65
End: 2020-07-20
Payer: COMMERCIAL

## 2020-07-20 VITALS
OXYGEN SATURATION: 98 % | HEART RATE: 90 BPM | TEMPERATURE: 98.6 F | DIASTOLIC BLOOD PRESSURE: 60 MMHG | SYSTOLIC BLOOD PRESSURE: 120 MMHG | RESPIRATION RATE: 18 BRPM

## 2020-07-20 PROCEDURE — G0151 HHCP-SERV OF PT,EA 15 MIN: HCPCS

## 2020-07-22 ENCOUNTER — HOME CARE VISIT (OUTPATIENT)
Dept: HOME HEALTH SERVICES | Facility: HOME HEALTH | Age: 65
End: 2020-07-22
Payer: COMMERCIAL

## 2020-07-23 ENCOUNTER — HOME CARE VISIT (OUTPATIENT)
Dept: SCHEDULING | Facility: HOME HEALTH | Age: 65
End: 2020-07-23
Payer: COMMERCIAL

## 2020-07-23 ENCOUNTER — HOME CARE VISIT (OUTPATIENT)
Dept: HOME HEALTH SERVICES | Facility: HOME HEALTH | Age: 65
End: 2020-07-23
Payer: COMMERCIAL

## 2020-07-23 VITALS
RESPIRATION RATE: 18 BRPM | SYSTOLIC BLOOD PRESSURE: 120 MMHG | OXYGEN SATURATION: 98 % | TEMPERATURE: 98.5 F | HEART RATE: 70 BPM | DIASTOLIC BLOOD PRESSURE: 60 MMHG

## 2020-07-23 PROCEDURE — G0151 HHCP-SERV OF PT,EA 15 MIN: HCPCS

## 2020-07-27 ENCOUNTER — HOME CARE VISIT (OUTPATIENT)
Dept: HOME HEALTH SERVICES | Facility: HOME HEALTH | Age: 65
End: 2020-07-27
Payer: COMMERCIAL

## 2020-07-27 ENCOUNTER — HOME CARE VISIT (OUTPATIENT)
Dept: SCHEDULING | Facility: HOME HEALTH | Age: 65
End: 2020-07-27
Payer: COMMERCIAL

## 2020-07-27 VITALS
DIASTOLIC BLOOD PRESSURE: 60 MMHG | RESPIRATION RATE: 18 BRPM | OXYGEN SATURATION: 98 % | SYSTOLIC BLOOD PRESSURE: 110 MMHG | TEMPERATURE: 98.5 F | HEART RATE: 90 BPM

## 2020-07-27 PROCEDURE — G0151 HHCP-SERV OF PT,EA 15 MIN: HCPCS

## 2020-07-28 ENCOUNTER — HOME CARE VISIT (OUTPATIENT)
Dept: HOME HEALTH SERVICES | Facility: HOME HEALTH | Age: 65
End: 2020-07-28
Payer: COMMERCIAL

## 2020-07-28 ENCOUNTER — HOME CARE VISIT (OUTPATIENT)
Dept: SCHEDULING | Facility: HOME HEALTH | Age: 65
End: 2020-07-28
Payer: COMMERCIAL

## 2020-07-28 ENCOUNTER — EMPLOYEE WELLNESS (OUTPATIENT)
Dept: OTHER | Facility: CLINIC | Age: 65
End: 2020-07-28

## 2020-07-28 VITALS
DIASTOLIC BLOOD PRESSURE: 60 MMHG | HEART RATE: 80 BPM | OXYGEN SATURATION: 98 % | TEMPERATURE: 98.3 F | RESPIRATION RATE: 18 BRPM | SYSTOLIC BLOOD PRESSURE: 110 MMHG

## 2020-07-28 LAB
CHOLEST SERPL-MCNC: 127 MG/DL
GLUCOSE SERPL-MCNC: 82 MG/DL (ref 65–100)
HDLC SERPL-MCNC: 40 MG/DL
LDLC SERPL CALC-MCNC: 37.6 MG/DL (ref 0–100)
TRIGL SERPL-MCNC: 247 MG/DL (ref ?–150)

## 2020-07-28 PROCEDURE — G0151 HHCP-SERV OF PT,EA 15 MIN: HCPCS

## 2020-07-30 ENCOUNTER — HOME CARE VISIT (OUTPATIENT)
Dept: SCHEDULING | Facility: HOME HEALTH | Age: 65
End: 2020-07-30
Payer: COMMERCIAL

## 2020-07-30 ENCOUNTER — PATIENT OUTREACH (OUTPATIENT)
Dept: OTHER | Age: 65
End: 2020-07-30

## 2020-07-30 ENCOUNTER — HOME CARE VISIT (OUTPATIENT)
Dept: HOME HEALTH SERVICES | Facility: HOME HEALTH | Age: 65
End: 2020-07-30
Payer: COMMERCIAL

## 2020-07-30 VITALS
HEART RATE: 90 BPM | RESPIRATION RATE: 18 BRPM | SYSTOLIC BLOOD PRESSURE: 110 MMHG | OXYGEN SATURATION: 98 % | TEMPERATURE: 98.5 F | DIASTOLIC BLOOD PRESSURE: 60 MMHG

## 2020-07-30 PROCEDURE — G0151 HHCP-SERV OF PT,EA 15 MIN: HCPCS

## 2020-07-30 NOTE — PROGRESS NOTES
CCM follow up:  Care Manager contacted the patient by telephone in follow up. Verified  and zip code with patient as identifiers. CM spoke with patient who reports she is doing very well S/P CURTIS. But states has had a flare up of Gout in right foot. States has medication for it. Reports no pain with hip, ambulating with walker and continues HH Therapy, progressing well with goals. Denies any signs of infection or other complication. No new concerns or questions. Assessment of clinical changes and knowledge demonstrated since last call:   Ongoing Plan of Care:   Impaired Mobility                     Goal:  Demonstrate behaviors that enable safe resumption of ADLs without falls;  20 Continues in therapy and making good progress. Advancing in self care and ambulation;no falls reported  · 20 Participating in [facility] PT and HEP, states making slow but definite progress  · States using walker for ambulation in home without difficulty  · States no barriers in home for using walker safely  · Family in home to assist as needed  · Performing HEP as directed   · Reports HH PT started 7/10/20;     Acute Pain                      Goal:  Verbalize pain control with prescribed medications to level of 5/10   20 Denies any pain in hip. .rates pain at 0-1/10  · 20 Rates pain at 5-6/10, acceptable pain control  · Pain level 10/10 due to just completed PT  · States taking Norco, Tramadol and Tylenol as directed with acceptable control  · Using ice as directed and elevating leg to help with pain control           Impaired Skin Integrity     Goal:  Demonstrates no signs of infection postop  20 Reports incision healing well with no signs of infection  20 Incision healing well, no signs of infection  · Incision covered and dressing to be removed by [facility] in one week  · Keep dressing dry and clean;  · Remove dressing that becomes wet, soiled, or has drainage;  · Review red flags with patient and to notify the surgeon immediately  ? Fever >100;  ? Increased incision drainage or oozing;  ? Increased redness or swelling around incision site;  ? Decreased sensation and motor activity in extremities; Worsening pain      Review and discussion of plan of care with patient, who has provided input to plan, verbalized understanding and agrees with current goals. Any recurrence Red Flags or continued symptoms:none     Medication Regimen Change:none  Completed a review of medications with patient, who verbalized understanding of how and when to take medications. Barriers / Adherence with medications:none    Upcoming Appointments:    Future Appointments   Date Time Provider Josafat Garnica   7/30/2020  3:00 PM Georgia Torres       Patient asked questions appropriately and denied any additional needs at this time. Patient verbalized understanding of all information discussed. Patient has my name and contact information for any follow up needs or questions.      Plan next call:   2 to 3 weeks

## 2020-08-11 ENCOUNTER — HOSPITAL ENCOUNTER (OUTPATIENT)
Dept: PREADMISSION TESTING | Age: 65
Discharge: HOME | End: 2020-08-11
Payer: COMMERCIAL

## 2020-08-11 ENCOUNTER — HOSPITAL ENCOUNTER (OUTPATIENT)
Dept: GENERAL RADIOLOGY | Age: 65
Discharge: HOME | End: 2020-08-11
Attending: UROLOGY
Payer: COMMERCIAL

## 2020-08-11 VITALS
TEMPERATURE: 98.3 F | HEIGHT: 59 IN | SYSTOLIC BLOOD PRESSURE: 100 MMHG | OXYGEN SATURATION: 100 % | BODY MASS INDEX: 26.71 KG/M2 | DIASTOLIC BLOOD PRESSURE: 64 MMHG | WEIGHT: 132.5 LBS | HEART RATE: 90 BPM | RESPIRATION RATE: 18 BRPM

## 2020-08-11 LAB
ALBUMIN SERPL-MCNC: 4.2 G/DL (ref 3.5–5)
ALBUMIN/GLOB SERPL: 1.3 {RATIO} (ref 1.1–2.2)
ALP SERPL-CCNC: 129 U/L (ref 45–117)
ALT SERPL-CCNC: 15 U/L (ref 12–78)
ANION GAP SERPL CALC-SCNC: 8 MMOL/L (ref 5–15)
APPEARANCE UR: CLEAR
AST SERPL-CCNC: 13 U/L (ref 15–37)
BACTERIA URNS QL MICRO: NEGATIVE /HPF
BASOPHILS # BLD: 0.1 K/UL (ref 0–0.1)
BASOPHILS NFR BLD: 1 % (ref 0–1)
BILIRUB SERPL-MCNC: 0.5 MG/DL (ref 0.2–1)
BILIRUB UR QL: NEGATIVE
BUN SERPL-MCNC: 14 MG/DL (ref 6–20)
BUN/CREAT SERPL: 18 (ref 12–20)
CALCIUM SERPL-MCNC: 9.3 MG/DL (ref 8.5–10.1)
CHLORIDE SERPL-SCNC: 107 MMOL/L (ref 97–108)
CO2 SERPL-SCNC: 26 MMOL/L (ref 21–32)
COLOR UR: NORMAL
CREAT SERPL-MCNC: 0.77 MG/DL (ref 0.55–1.02)
DIFFERENTIAL METHOD BLD: ABNORMAL
EOSINOPHIL # BLD: 0.1 K/UL (ref 0–0.4)
EOSINOPHIL NFR BLD: 1 % (ref 0–7)
EPITH CASTS URNS QL MICRO: NORMAL /LPF
ERYTHROCYTE [DISTWIDTH] IN BLOOD BY AUTOMATED COUNT: 14.3 % (ref 11.5–14.5)
GLOBULIN SER CALC-MCNC: 3.2 G/DL (ref 2–4)
GLUCOSE SERPL-MCNC: 88 MG/DL (ref 65–100)
GLUCOSE UR STRIP.AUTO-MCNC: NEGATIVE MG/DL
HCT VFR BLD AUTO: 35.1 % (ref 35–47)
HGB BLD-MCNC: 11.3 G/DL (ref 11.5–16)
HGB UR QL STRIP: NEGATIVE
HYALINE CASTS URNS QL MICRO: NORMAL /LPF (ref 0–5)
IMM GRANULOCYTES # BLD AUTO: 0.1 K/UL (ref 0–0.04)
IMM GRANULOCYTES NFR BLD AUTO: 1 % (ref 0–0.5)
KETONES UR QL STRIP.AUTO: NEGATIVE MG/DL
LEUKOCYTE ESTERASE UR QL STRIP.AUTO: NEGATIVE
LYMPHOCYTES # BLD: 2 K/UL (ref 0.8–3.5)
LYMPHOCYTES NFR BLD: 20 % (ref 12–49)
MCH RBC QN AUTO: 31.4 PG (ref 26–34)
MCHC RBC AUTO-ENTMCNC: 32.2 G/DL (ref 30–36.5)
MCV RBC AUTO: 97.5 FL (ref 80–99)
MONOCYTES # BLD: 0.6 K/UL (ref 0–1)
MONOCYTES NFR BLD: 6 % (ref 5–13)
NEUTS SEG # BLD: 7.2 K/UL (ref 1.8–8)
NEUTS SEG NFR BLD: 71 % (ref 32–75)
NITRITE UR QL STRIP.AUTO: NEGATIVE
NRBC # BLD: 0 K/UL (ref 0–0.01)
NRBC BLD-RTO: 0 PER 100 WBC
PH UR STRIP: 6.5 [PH] (ref 5–8)
PLATELET # BLD AUTO: 289 K/UL (ref 150–400)
PMV BLD AUTO: 9.9 FL (ref 8.9–12.9)
POTASSIUM SERPL-SCNC: 3.9 MMOL/L (ref 3.5–5.1)
PROT SERPL-MCNC: 7.4 G/DL (ref 6.4–8.2)
PROT UR STRIP-MCNC: NEGATIVE MG/DL
RBC # BLD AUTO: 3.6 M/UL (ref 3.8–5.2)
RBC #/AREA URNS HPF: NORMAL /HPF (ref 0–5)
SODIUM SERPL-SCNC: 141 MMOL/L (ref 136–145)
SP GR UR REFRACTOMETRY: 1.01 (ref 1–1.03)
UA: UC IF INDICATED,UAUC: NORMAL
UROBILINOGEN UR QL STRIP.AUTO: 0.2 EU/DL (ref 0.2–1)
WBC # BLD AUTO: 10.1 K/UL (ref 3.6–11)
WBC URNS QL MICRO: NORMAL /HPF (ref 0–4)

## 2020-08-11 PROCEDURE — 86900 BLOOD TYPING SEROLOGIC ABO: CPT

## 2020-08-11 PROCEDURE — 36415 COLL VENOUS BLD VENIPUNCTURE: CPT

## 2020-08-11 PROCEDURE — 80053 COMPREHEN METABOLIC PANEL: CPT

## 2020-08-11 PROCEDURE — 86923 COMPATIBILITY TEST ELECTRIC: CPT

## 2020-08-11 PROCEDURE — 85025 COMPLETE CBC W/AUTO DIFF WBC: CPT

## 2020-08-11 PROCEDURE — 93005 ELECTROCARDIOGRAM TRACING: CPT

## 2020-08-11 PROCEDURE — 71046 X-RAY EXAM CHEST 2 VIEWS: CPT

## 2020-08-11 PROCEDURE — 81001 URINALYSIS AUTO W/SCOPE: CPT

## 2020-08-11 RX ORDER — LOSARTAN POTASSIUM 100 MG/1
100 TABLET ORAL DAILY
COMMUNITY
End: 2020-09-03 | Stop reason: ALTCHOICE

## 2020-08-11 NOTE — PERIOP NOTES
[address] [facility] [address], [address] [address]   MAIN OR                                  [phone]   MAIN PRE OP                          [phone]                                                                                AMBULATORY PRE OP          [phone]  PRE-ADMISSION TESTING    [phone]   Surgery Date:  8/25/2020       Is surgery arrival time given by surgeon? NO  If NO, [facility] staff will call you between 3 and 7pm the day before your surgery with your arrival time. (If your surgery is on a Monday, we will call you the Friday before.)    Call [phone] after 7pm Monday-Friday if you did not receive this call. INSTRUCTIONS BEFORE YOUR SURGERY   When You  Arrive Arrive at the [address] [facility] on the day of your surgery  Have your insurance card, photo ID, and any copayment (if needed)   Food   and   Drink NO food or drink after midnight the night before surgery    This means NO water, gum, mints, coffee, juice, etc.  No alcohol (beer, wine, liquor) 24 hours before and after surgery    Day before surgery eat a regular breakfast, then clear liquids only for lunch and dinner. Medications to   TAKE   Morning of Surgery MEDICATIONS TO TAKE THE MORNING OF SURGERY WITH A SIP OF WATER:    Montelukast   Atorvastatin   Albuterol inhaler if needed   Medications  To  STOP      7 days before surgery  Non-Steroidal anti-inflammatory Drugs (NSAID's): for example, Ibuprofen (Advil, Motrin), Naproxen (Aleve)   Aspirin, if taking for pain    Herbal supplements, vitamins, and fish oil   Other:  (Pain medications not listed above, including Tylenol may be taken)   Blood  Thinners  If you take  Aspirin, Plavix, Coumadin, or any blood-thinning or anti-blood clot medicine, talk to the doctor who prescribed the medications for pre-operative instructions.  Follow Dr Boyd's instructions for aspirin pre- operatively.    Bathing Clothing  Jewelry  Valuables      If you shower the morning of surgery, please do not apply anything to your skin (lotions, powders, deodorant, or makeup, especially mascara)   Follow Chlorhexidine Care Fusion body wash instructions provided to you during PAT appointment. Begin 3 days prior to surgery.  Do not shave or trim anywhere 24 hours before surgery   Wear your hair loose or down; no pony-tails, buns, or metal hair clips   Wear loose, comfortable, clean clothes   Wear glasses instead of contacts   Leave money, valuables, and jewelry, including body piercings, at home   Going Home - or Spending the Night  SAME-DAY SURGERY: You must have a responsible adult drive you home and stay with you 24 hours after surgery   ADMITS: If your doctor is keeping you in the hospital after surgery, leave personal belongings/luggage in your car until you have a hospital room number. Hospital discharge time is 12 noon  Drivers must be here before 12 noon unless you are told differently   Special Instructions Follow bowel prep as prescribed by Dr Darlene Gamino. It is now mandated that all surgical patients be tested for COVID-19 prior to surgery. Testing has to be exactly 4 days prior to surgery. Your COVID test date is 8/21/2020 between 8:00 am and 11:00 am.       COVID testing will be performed curbside at the [facility] [address] [place]. There will be signs leading you to the testing site. You will need to bring a photo ID with you to be swabbed. Patients are advised to self-quarantine at home after testing and prior to your surgery date. You will be notified if your results are positive.     What to watch for:   Coronavirus (COVID-19) affects different people in different ways   It also appears with a wide range of symptoms from mild to severe   Signs usually appear 2-14 days after exposure     If you develop any of the following, notify your doctor immediately:  o Fever  o Chills, with or without a shiver  o Muscle pain  o Headache  o Sore throat  o Dry cough  o New loss of taste or smell  o Tiredness      If you develop any of the following, call [phone]:  o Shortness of breath  o Difficulty breathing  o Chest pain  o New confusion  o Blueness of fingers and/or lips     Follow all instructions so your surgery wont be cancelled. Please, be on time. If a situation occurs and you are delayed the day of surgery, call [phone]. If your physical condition changes (like a fever, cold, flu, etc.) call your surgeon. Home medication(s) reviewed and verified via  LIST  during PAT appointment. The patient was contacted by   IN-PERSON  The patient verbalizes understanding of all instructions and  DOES NOT   need reinforcement.

## 2020-08-12 LAB
ATRIAL RATE: 75 BPM
CALCULATED P AXIS, ECG09: 54 DEGREES
CALCULATED R AXIS, ECG10: 19 DEGREES
CALCULATED T AXIS, ECG11: 45 DEGREES
DIAGNOSIS, 93000: NORMAL
P-R INTERVAL, ECG05: 136 MS
Q-T INTERVAL, ECG07: 388 MS
QRS DURATION, ECG06: 68 MS
QTC CALCULATION (BEZET), ECG08: 433 MS
VENTRICULAR RATE, ECG03: 75 BPM

## 2020-08-12 NOTE — PROGRESS NOTES
Reached out to Sia Medrano with Dr. Rob Heller office and she is ok to stop her ASA. I have called and let her know.

## 2020-08-12 NOTE — H&P
Preoperative Evaluation                     History and Physical with Surgical Risk Stratification     8/11/2020    CC: Renal cyst  Surgery: Left partial Nephrectomy     HPI:   Adela Hooker is a 62 y.o. female referred for pre-operative evaluation by Dr. John Arce for surgery on 8/25/20. Ms. Mayra Graham has a history of a renal \"cyst\" that she states over the last 90 days has gotten bigger. She underwent a [address] on 7/8/20 and her ortho surgeon and Dr. John Arce communicated for the best time to have this surgery. She is going to be 6 weeks postop on surgery day. She notes the cyst was originally found incidentally and she denies pain or urinary difficulty. She is currently still taking ASA from her hip surgery. Typically this is only a 30 days course, will reach out to ortho surgeon and ask if it is ok to stop. The patient was evaluated in the surgeon's office and it was determined that the most appropriate plan of care is to proceed with surgical intervention. Patient's PCP Yuri Meadows MD    Review of Systems     Constitutional: Negative for chills and fever  HENT: Negative for congestion and sore throat  Eyes: negative for blurred vision and double vision  Respiratory: Negative for cough, shortness of breath and wheezing  Mouth: Negative for loose, broken or chipped teeth. Cardiovascular: Negative for chest pain and palpitations  Gastrointestinal: Negative for abdominal pain, constipation, diarrhea and nausea  Genitourinary: Negative for dysuria and hematuria  Musculoskeletal: Ache in left hip  Skin: Negative for rash, open wounds. Negative for bruises easily  Neurological: Negative for dizziness, tremors and headaches  Psychiatric: Negative for depression. The patient is not nervous/anxious.     Inherent Risk of Surgery     Surgical risk:  Intermediate  Low:  EIntermediate:   High:    Patient Cardiac Risk Assessment     Revised Cardiac Risk Index (RCRI)    Rate if cardiac death, nonfatal MI, nonfatal cardiac arrest by number of risk factor- 0.4%    ANAST[place]/AHA 2007 Guidelines:   1) Surgery Emergency, Non-cardiac -> to surgery  2) If not, look at clinical predictors    Major Intermediate Minor       Blood Thinner: ASA    METS      EQUAL TO 4 Care for self Walk indoors around house Walk 2-3 blocks on level ground (2-3 mph) Light work around house (dust, dishes)     Other Risk Factors:   Screening for ETOH use:  Done and low risk  Smoking status:  Currently     Personal or FH of bleeding problems:  No  Personal or FH of blood clots:  No  Personal or FH of anesthesia problems:   No    Pulmonary Risk:  Asthma or COPD:  No  Body mass index is 26.76 kg/m². Known KAYE:  No  Albumin normal, BUN normal    Past Medical, Surgical, Social History     Allergies: No Known Allergies    Medication Documentation Review Audit     Reviewed by Andre Eaton RN (Registered Nurse) on 08/11/20 at 5    Medication Sig Documenting Provider Last Dose Status Taking? albuterol (PROVENTIL HFA, VENTOLIN HFA, PROAIR HFA) 90 mcg/actuation inhaler Take 2 puffs by inhalation every six (6) hours as needed for Wheezing. Heide Cuadra, DO  Active Yes           Med Note (Nithya Pearson   u Feb 27, 2020  5:14 AM) Used about 6 or 7 months ago per patient    aspirin 81 mg chewable tablet Take 1 Tab by mouth two (2) times a day. Jessica FONG PA-C  Active Yes   atorvastatin (LIPITOR) 40 mg tablet Take 40 mg by mouth daily. Provider, Historical  Active Yes           Med Note (Lova Tate   Fri Jun 19, 2020 12:11 PM) Patient instructed to take preop-day of surgery with sip of water per anesthesia guidelines. cholecalciferol (VITAMIN D3) 1,000 unit cap Take 1,000 Units by mouth daily. Provider, Historical  Active Yes           Med Note (Marisue Counter Aug 11, 2020  1:09 PM)     Hydrochlorothiazide (HYDRODIURIL) 12.5 mg tablet Take 1 tablet by mouth daily. Needs to schedule ov before further refills.   Indications: HYPERTENSION Clemente Sanchez NP  Active Yes           Med Note (Yamilet Popfranny   Fri Jun 19, 2020 12:13 PM) Pt instructed to hold this med morning of surgery per anesthesia protocol    indomethacin (INDOCIN) 25 mg capsule Take 25 mg by mouth three (3) times daily as needed for Other (for gout pain). Provider, Historical  Active Yes   losartan (COZAAR) 100 mg tablet Take 100 mg by mouth daily. Provider, Historical  Active Yes   montelukast (Singulair) 10 mg tablet Take 10 mg by mouth daily. Provider, Historical  Active Yes           Med Note (Brigittetod Popfranny   Fri Jun 19, 2020 12:13 PM) Patient instructed to take preop-day of surgery with sip of water per anesthesia guidelines.                Past Medical History:   Diagnosis Date    Allergic asthma     SEASONAL AND DOG ALLERGIES    Arthritis     Cyst of left kidney 01/26/2020    Hypercholesterolemia 2001    Hypertension 2001    Non-seasonal allergic rhinitis 6-2011    dr Carmelita Gillette Osteoporosis 2006     Past Surgical History:   Procedure Laterality Date    COLONOSCOPY N/A 11/17/2017    COL[facility]e [facility]cer[facility] at [facility] ENDOSCOPY    ENDOSCOPY, COLON, DIAGNOSTIC  2007    due 2017    HX HIP REPLACEMENT Bilateral 2017 & 07/08/2020    HX OVARIAN CYST REMOVAL  1974    NATALIE BIOPSY BREAST STEREOTACTIC Right 2010    benign    NEUROLOGICAL PROCEDURE UNLISTED  2020    TRIGGER FINGER, CYST REMOVAL, TENDON RELEASE     Social History     Tobacco Use    Smoking status: Current Every Day Smoker     Packs/day: 0.50     Years: 37.00     Pack years: 18.50     Types: Cigarettes    Smokeless tobacco: Never Used   Substance Use Topics    Alcohol use: Yes     Comment: SOCIALLY    Drug use: No     Family History   Problem Relation Age of Onset    Breast Cancer Maternal Aunt     Dementia Mother     Hypertension Mother     High Cholesterol Mother     Diabetes Mother     Cancer Father         COLON    Hypertension Sister     Diabetes Sister    Marah Virgen Hypertension Brother     Heart Disease Brother     Hypertension Sister     High Cholesterol Sister     Hypertension Brother     Anesth Problems Neg Hx        Objective     Vitals:    08/11/20 1307   BP: 100/64   Pulse: 90   Resp: 18   Temp: 98.3 °F (36.8 °C)   SpO2: 100%   Weight: 60.1 kg (132 lb 7.9 oz)   Height: 4' 11\" (1.499 m)       Constitutional:  Appears well,  No Acute Distress, Vitals noted  Psychiatric:   Affect normal, Alert and Oriented to person/place/time    Eyes:   Pupils equally round and reactive, EOMI, conjunctiva clear, eyelids normal  ENT:   External ears and nose normal, teeth normal, gums normal, TMs and Orophyarynx normal  Neck:   General inspection and Thyroid normal.  No abnormal cervical or supraclavicular nodes    Lungs:   Clear to auscultation, good respiratory effort  Heart: Ausculation normal.  Regular rhythm. No cardiac murmurs. No carotid bruits or palpable thrills  Chest wall normal  Musculoskeletal: Gait antalgic with cane  Extremities:   Without edema, good peripheral pulses  Skin:   Warm to palpation, without rashes, bruising, or suspicious lesions     Recent Results (from the past 72 hour(s))   CBC WITH AUTOMATED DIFF    Collection Time: 08/11/20  2:04 PM   Result Value Ref Range    WBC 10.1 3.6 - 11.0 K/uL    RBC 3.60 (L) 3.80 - 5.20 M/uL    HGB 11.3 (L) 11.5 - 16.0 g/dL    HCT 35.1 35.0 - 47.0 %    MCV 97.5 80.0 - 99.0 FL    MCH 31.4 26.0 - 34.0 PG    MCHC 32.2 30.0 - 36.5 g/dL    RDW 14.3 11.5 - 14.5 %    PLATELET 973 754 - 512 K/uL    MPV 9.9 8.9 - 12.9 FL    NRBC 0.0 0  WBC    ABSOLUTE NRBC 0.00 0.00 - 0.01 K/uL    NEUTROPHILS 71 32 - 75 %    LYMPHOCYTES 20 12 - 49 %    MONOCYTES 6 5 - 13 %    EOSINOPHILS 1 0 - 7 %    BASOPHILS 1 0 - 1 %    IMMATURE GRANULOCYTES 1 (H) 0.0 - 0.5 %    ABS. NEUTROPHILS 7.2 1.8 - 8.0 K/UL    ABS. LYMPHOCYTES 2.0 0.8 - 3.5 K/UL    ABS. MONOCYTES 0.6 0.0 - 1.0 K/UL    ABS. EOSINOPHILS 0.1 0.0 - 0.4 K/UL    ABS.  BASOPHILS 0.1 0.0 - 0.1 K/UL    ABS. IMM. GRANS. 0.1 (H) 0.00 - 0.04 K/UL    DF AUTOMATED     METABOLIC PANEL, COMPREHENSIVE    Collection Time: 08/11/20  2:04 PM   Result Value Ref Range    Sodium 141 136 - 145 mmol/L    Potassium 3.9 3.5 - 5.1 mmol/L    Chloride 107 97 - 108 mmol/L    CO2 26 21 - 32 mmol/L    Anion gap 8 5 - 15 mmol/L    Glucose 88 65 - 100 mg/dL    BUN 14 6 - 20 MG/DL    Creatinine 0.77 0.55 - 1.02 MG/DL    BUN/Creatinine ratio 18 12 - 20      GFR est AA >60 >60 ml/min/1.73m2    GFR est non-AA >60 >60 ml/min/1.73m2    Calcium 9.3 8.5 - 10.1 MG/DL    Bilirubin, total 0.5 0.2 - 1.0 MG/DL    ALT (SGPT) 15 12 - 78 U/L    AST (SGOT) 13 (L) 15 - 37 U/L    Alk.  phosphatase 129 (H) 45 - 117 U/L    Protein, total 7.4 6.4 - 8.2 g/dL    Albumin 4.2 3.5 - 5.0 g/dL    Globulin 3.2 2.0 - 4.0 g/dL    A-G Ratio 1.3 1.1 - 2.2     URINALYSIS W/ REFLEX CULTURE    Collection Time: 08/11/20  2:04 PM    Specimen: Urine   Result Value Ref Range    Color YELLOW/STRAW      Appearance CLEAR CLEAR      Specific gravity 1.008 1.003 - 1.030      pH (UA) 6.5 5.0 - 8.0      Protein Negative NEG mg/dL    Glucose Negative NEG mg/dL    Ketone Negative NEG mg/dL    Bilirubin Negative NEG      Blood Negative NEG      Urobilinogen 0.2 0.2 - 1.0 EU/dL    Nitrites Negative NEG      Leukocyte Esterase Negative NEG      WBC 0-4 0 - 4 /hpf    RBC 0-5 0 - 5 /hpf    Epithelial cells FEW FEW /lpf    Bacteria Negative NEG /hpf    UA:UC IF INDICATED CULTURE NOT INDICATED BY UA RESULT CNI      Hyaline cast 0-2 0 - 5 /lpf   TYPE + CROSSMATCH    Collection Time: 08/11/20  2:04 PM   Result Value Ref Range    Crossmatch Expiration 08/25/2020     ABO/Rh(D) Joi Border POSITIVE     Antibody screen NEG    EKG, 12 LEAD, INITIAL    Collection Time: 08/11/20  2:40 PM   Result Value Ref Range    Ventricular Rate 75 BPM    Atrial Rate 75 BPM    P-R Interval 136 ms    QRS Duration 68 ms    Q-T Interval 388 ms    QTC Calculation (Bezet) 433 ms    Calculated P Axis 54 degrees Calculated R Axis 19 degrees    Calculated T Axis 45 degrees    Diagnosis       Normal sinus rhythm  Normal ECG  When compared with ECG of 19-JUN-2020 11:30,  No significant change was found  Confirmed by Munir Soto MD., Riri Cruz ([id number]) on 8/12/2020 11:14:39 AM         Assessment and Plan     Assessment/Plan:   1) Renal mass  2) Pre-Operative Evaluation    Labs, CXR and EKG reviewed. CXR- according to tracking on image this was sent to provider on the day it was done. Will reach out to Dr. Aayush Del Rio and see if it is ok for her to stop her ASA. Preoperative Clearance  Per RCRI, the patient has a 0.4% risk of cardiac death, nonfatal MI, nonfatal cardiac arrest based on no risk factors. Per ACC/AHA guidelines, patient is low risk for a(n) intermediate risk surgery and may proceed to planned surgery with the above noted risk.     Tata Lara NP

## 2020-08-21 ENCOUNTER — HOSPITAL ENCOUNTER (OUTPATIENT)
Dept: PREADMISSION TESTING | Age: 65
Discharge: HOME | End: 2020-08-21
Payer: COMMERCIAL

## 2020-08-21 ENCOUNTER — PATIENT OUTREACH (OUTPATIENT)
Dept: OTHER | Age: 65
End: 2020-08-21

## 2020-08-21 PROCEDURE — 87635 SARS-COV-2 COVID-19 AMP PRB: CPT

## 2020-08-21 NOTE — PROGRESS NOTES
Outreach to patient on behalf of Brent Macedo, [facility]. Follow up phone call to patient, two pt identifiers verified. Discussed patient's goals:   Goals      Demonstrate behaviors that enable safe resumption of ADLs without falls;      8/21: Has stopped PT as of yesterday due to COVID precautions for next procedure for 8/25.  Demonstrates no signs of infection postop      8/21: no infection       Verbalize pain control with prescribed medications to level of 5/10      8/21: Pain is fine. No pain. Patient's primary care provider relationship reviewed with patient and modified, as applicable. Patient states she is scheduled for a procedure on 8/25. Otherwise, she is doing well. Will be able to go back to PT after she is cleared from procedure that is taking place next week. Plan for next call: Follow up after 8/26 for possible post op.

## 2020-08-21 NOTE — Clinical Note
She is doing fine. Has another procedure on 8/25. F/up after that. Will postpone to 8/26 for possible f/up.

## 2020-08-22 LAB — SARS-COV-2, COV2NT: NOT DETECTED

## 2020-08-24 ENCOUNTER — ANESTHESIA EVENT (OUTPATIENT)
Dept: SURGERY | Age: 65
DRG: 661 | End: 2020-08-24
Payer: COMMERCIAL

## 2020-08-25 ENCOUNTER — ANESTHESIA (OUTPATIENT)
Dept: SURGERY | Age: 65
DRG: 661 | End: 2020-08-25
Payer: COMMERCIAL

## 2020-08-25 ENCOUNTER — HOSPITAL ENCOUNTER (INPATIENT)
Age: 65
LOS: 2 days | Discharge: HOME | DRG: 661 | End: 2020-08-27
Attending: UROLOGY | Admitting: UROLOGY
Payer: COMMERCIAL

## 2020-08-25 DIAGNOSIS — N28.89 RENAL MASS, LEFT: Primary | ICD-10-CM

## 2020-08-25 PROCEDURE — 77030005513 HC CATH URETH FOL11 MDII -B: Performed by: UROLOGY

## 2020-08-25 PROCEDURE — 77030003666 HC NDL SPINAL BD -A: Performed by: UROLOGY

## 2020-08-25 PROCEDURE — 74011000250 HC RX REV CODE- 250: Performed by: UROLOGY

## 2020-08-25 PROCEDURE — 77030014008 HC SPNG HEMSTAT J&J -C: Performed by: UROLOGY

## 2020-08-25 PROCEDURE — 74011250636 HC RX REV CODE- 250/636: Performed by: ANESTHESIOLOGY

## 2020-08-25 PROCEDURE — 77030035277 HC OBTRTR BLDELSS DISP INTU -B: Performed by: UROLOGY

## 2020-08-25 PROCEDURE — 74011250636 HC RX REV CODE- 250/636: Performed by: NURSE ANESTHETIST, CERTIFIED REGISTERED

## 2020-08-25 PROCEDURE — 77030020703 HC SEAL CANN DISP INTU -B: Performed by: UROLOGY

## 2020-08-25 PROCEDURE — 77030002916 HC SUT ETHLN J&J -A: Performed by: UROLOGY

## 2020-08-25 PROCEDURE — 77030018390 HC SPNG HEMSTAT2 J&J -B: Performed by: UROLOGY

## 2020-08-25 PROCEDURE — 77030037032 HC INSRT SCIS CLICKLLINE DISP STOR -B: Performed by: UROLOGY

## 2020-08-25 PROCEDURE — 77030008756 HC TU IRR SUC STRY -B: Performed by: UROLOGY

## 2020-08-25 PROCEDURE — 77030040506 HC DRN WND MDII -A: Performed by: UROLOGY

## 2020-08-25 PROCEDURE — 88307 TISSUE EXAM BY PATHOLOGIST: CPT

## 2020-08-25 PROCEDURE — 77030007955 HC PCH ENDOSC SPEC J&J -B: Performed by: UROLOGY

## 2020-08-25 PROCEDURE — 74011000250 HC RX REV CODE- 250: Performed by: NURSE ANESTHETIST, CERTIFIED REGISTERED

## 2020-08-25 PROCEDURE — 65270000029 HC RM PRIVATE

## 2020-08-25 PROCEDURE — 77030040830 HC CATH URETH FOL MDII -A: Performed by: UROLOGY

## 2020-08-25 PROCEDURE — 77030033730 HC CLP LAPRO ABS LPW COVD -C: Performed by: UROLOGY

## 2020-08-25 PROCEDURE — 8E0W4CZ ROBOTIC ASSISTED PROCEDURE OF TRUNK REGION, PERCUTANEOUS ENDOSCOPIC APPROACH: ICD-10-PCS | Performed by: UROLOGY

## 2020-08-25 PROCEDURE — 77030011640 HC PAD GRND REM COVD -A: Performed by: UROLOGY

## 2020-08-25 PROCEDURE — 77030027138 HC INCENT SPIROMETER -A

## 2020-08-25 PROCEDURE — 77030002933 HC SUT MCRYL J&J -A: Performed by: UROLOGY

## 2020-08-25 PROCEDURE — 77030010935 HC CLP LIG ABSRB TELE -B: Performed by: UROLOGY

## 2020-08-25 PROCEDURE — 77030002986 HC SUT PROL J&J -A: Performed by: UROLOGY

## 2020-08-25 PROCEDURE — 77030041523 HC SEALNT FIBRN VITASEAL J&J -E: Performed by: UROLOGY

## 2020-08-25 PROCEDURE — 77030008771 HC TU NG SALEM SUMP -A: Performed by: ANESTHESIOLOGY

## 2020-08-25 PROCEDURE — 77030009848 HC PASSR SUT SET COOP -C: Performed by: UROLOGY

## 2020-08-25 PROCEDURE — 77030040922 HC BLNKT HYPOTHRM STRY -A

## 2020-08-25 PROCEDURE — 77030016151 HC PROTCTR LNS DFOG COVD -B: Performed by: UROLOGY

## 2020-08-25 PROCEDURE — 77030022704 HC SUT VLOC COVD -B: Performed by: UROLOGY

## 2020-08-25 PROCEDURE — 77030026438 HC STYL ET INTUB CARD -A: Performed by: ANESTHESIOLOGY

## 2020-08-25 PROCEDURE — 77030018836 HC SOL IRR NACL ICUM -A: Performed by: UROLOGY

## 2020-08-25 PROCEDURE — 74011250636 HC RX REV CODE- 250/636: Performed by: UROLOGY

## 2020-08-25 PROCEDURE — 77030031492 HC PRT ACC BLNT AIRSEAL CNMD -B: Performed by: UROLOGY

## 2020-08-25 PROCEDURE — 77030013532 HC SEAL FBRN TISSL RDYTUSE 4ML BAXT -C: Performed by: UROLOGY

## 2020-08-25 PROCEDURE — 74011250637 HC RX REV CODE- 250/637: Performed by: UROLOGY

## 2020-08-25 PROCEDURE — 77030040361 HC SLV COMPR DVT MDII -B

## 2020-08-25 PROCEDURE — 77030020263 HC SOL INJ SOD CL0.9% LFCR 1000ML: Performed by: UROLOGY

## 2020-08-25 PROCEDURE — 77030010507 HC ADH SKN DERMBND J&J -B: Performed by: UROLOGY

## 2020-08-25 PROCEDURE — 76210000016 HC OR PH I REC 1 TO 1.5 HR: Performed by: UROLOGY

## 2020-08-25 PROCEDURE — 77030041525 HC APPL LAP VITASEAL J&J -B: Performed by: UROLOGY

## 2020-08-25 PROCEDURE — 0TB14ZZ EXCISION OF LEFT KIDNEY, PERCUTANEOUS ENDOSCOPIC APPROACH: ICD-10-PCS | Performed by: UROLOGY

## 2020-08-25 PROCEDURE — 77030013079 HC BLNKT BAIR HGGR 3M -A: Performed by: ANESTHESIOLOGY

## 2020-08-25 PROCEDURE — 76060000038 HC ANESTHESIA 3.5 TO 4 HR: Performed by: UROLOGY

## 2020-08-25 PROCEDURE — 77030003578 HC NDL INSUF VERES AMR -B: Performed by: UROLOGY

## 2020-08-25 PROCEDURE — 76010000879 HC OR TIME 3.5 TO 4HR INTENSV - TIER 2: Performed by: UROLOGY

## 2020-08-25 PROCEDURE — 77030008684 HC TU ET CUF COVD -B: Performed by: ANESTHESIOLOGY

## 2020-08-25 RX ORDER — FENTANYL CITRATE 50 UG/ML
INJECTION, SOLUTION INTRAMUSCULAR; INTRAVENOUS AS NEEDED
Status: DISCONTINUED | OUTPATIENT
Start: 2020-08-25 | End: 2020-08-25 | Stop reason: HOSPADM

## 2020-08-25 RX ORDER — ZOLPIDEM TARTRATE 5 MG/1
5 TABLET ORAL
Status: DISCONTINUED | OUTPATIENT
Start: 2020-08-25 | End: 2020-08-27 | Stop reason: HOSPADM

## 2020-08-25 RX ORDER — MANNITOL 20 G/100ML
INJECTION, SOLUTION INTRAVENOUS
Status: DISCONTINUED | OUTPATIENT
Start: 2020-08-25 | End: 2020-08-25 | Stop reason: HOSPADM

## 2020-08-25 RX ORDER — GUAIFENESIN 100 MG/5ML
81 LIQUID (ML) ORAL DAILY
Status: ON HOLD | COMMUNITY
End: 2020-08-25

## 2020-08-25 RX ORDER — LIDOCAINE HYDROCHLORIDE 20 MG/ML
INJECTION, SOLUTION EPIDURAL; INFILTRATION; INTRACAUDAL; PERINEURAL AS NEEDED
Status: DISCONTINUED | OUTPATIENT
Start: 2020-08-25 | End: 2020-08-25 | Stop reason: HOSPADM

## 2020-08-25 RX ORDER — ATORVASTATIN CALCIUM 20 MG/1
40 TABLET, FILM COATED ORAL DAILY
Status: DISCONTINUED | OUTPATIENT
Start: 2020-08-26 | End: 2020-08-27 | Stop reason: HOSPADM

## 2020-08-25 RX ORDER — PROPOFOL 10 MG/ML
INJECTION, EMULSION INTRAVENOUS AS NEEDED
Status: DISCONTINUED | OUTPATIENT
Start: 2020-08-25 | End: 2020-08-25 | Stop reason: HOSPADM

## 2020-08-25 RX ORDER — MIDAZOLAM HYDROCHLORIDE 1 MG/ML
INJECTION, SOLUTION INTRAMUSCULAR; INTRAVENOUS AS NEEDED
Status: DISCONTINUED | OUTPATIENT
Start: 2020-08-25 | End: 2020-08-25 | Stop reason: HOSPADM

## 2020-08-25 RX ORDER — LIDOCAINE HYDROCHLORIDE 10 MG/ML
0.1 INJECTION, SOLUTION EPIDURAL; INFILTRATION; INTRACAUDAL; PERINEURAL AS NEEDED
Status: DISCONTINUED | OUTPATIENT
Start: 2020-08-25 | End: 2020-08-25 | Stop reason: HOSPADM

## 2020-08-25 RX ORDER — SODIUM CHLORIDE, SODIUM LACTATE, POTASSIUM CHLORIDE, CALCIUM CHLORIDE 600; 310; 30; 20 MG/100ML; MG/100ML; MG/100ML; MG/100ML
100 INJECTION, SOLUTION INTRAVENOUS CONTINUOUS
Status: DISCONTINUED | OUTPATIENT
Start: 2020-08-25 | End: 2020-08-25 | Stop reason: HOSPADM

## 2020-08-25 RX ORDER — MORPHINE SULFATE 2 MG/ML
2 INJECTION, SOLUTION INTRAMUSCULAR; INTRAVENOUS
Status: DISCONTINUED | OUTPATIENT
Start: 2020-08-25 | End: 2020-08-27 | Stop reason: HOSPADM

## 2020-08-25 RX ORDER — HYDROMORPHONE HYDROCHLORIDE 1 MG/ML
.25-1 INJECTION, SOLUTION INTRAMUSCULAR; INTRAVENOUS; SUBCUTANEOUS
Status: DISCONTINUED | OUTPATIENT
Start: 2020-08-25 | End: 2020-08-25 | Stop reason: HOSPADM

## 2020-08-25 RX ORDER — LIDOCAINE HYDROCHLORIDE 10 MG/ML
INJECTION INFILTRATION; PERINEURAL AS NEEDED
Status: DISCONTINUED | OUTPATIENT
Start: 2020-08-25 | End: 2020-08-25 | Stop reason: HOSPADM

## 2020-08-25 RX ORDER — HYDROCHLOROTHIAZIDE 25 MG/1
12.5 TABLET ORAL DAILY
Status: DISCONTINUED | OUTPATIENT
Start: 2020-08-26 | End: 2020-08-27 | Stop reason: HOSPADM

## 2020-08-25 RX ORDER — SODIUM CHLORIDE 0.9 % (FLUSH) 0.9 %
5-40 SYRINGE (ML) INJECTION EVERY 8 HOURS
Status: DISCONTINUED | OUTPATIENT
Start: 2020-08-25 | End: 2020-08-27 | Stop reason: HOSPADM

## 2020-08-25 RX ORDER — SODIUM CHLORIDE, SODIUM LACTATE, POTASSIUM CHLORIDE, CALCIUM CHLORIDE 600; 310; 30; 20 MG/100ML; MG/100ML; MG/100ML; MG/100ML
125 INJECTION, SOLUTION INTRAVENOUS CONTINUOUS
Status: DISCONTINUED | OUTPATIENT
Start: 2020-08-25 | End: 2020-08-25 | Stop reason: HOSPADM

## 2020-08-25 RX ORDER — ONDANSETRON 2 MG/ML
4 INJECTION INTRAMUSCULAR; INTRAVENOUS
Status: DISCONTINUED | OUTPATIENT
Start: 2020-08-25 | End: 2020-08-27 | Stop reason: HOSPADM

## 2020-08-25 RX ORDER — HYDROMORPHONE HYDROCHLORIDE 2 MG/ML
INJECTION, SOLUTION INTRAMUSCULAR; INTRAVENOUS; SUBCUTANEOUS AS NEEDED
Status: DISCONTINUED | OUTPATIENT
Start: 2020-08-25 | End: 2020-08-25 | Stop reason: HOSPADM

## 2020-08-25 RX ORDER — ALBUTEROL SULFATE 90 UG/1
2 AEROSOL, METERED RESPIRATORY (INHALATION)
Status: DISCONTINUED | OUTPATIENT
Start: 2020-08-25 | End: 2020-08-27 | Stop reason: HOSPADM

## 2020-08-25 RX ORDER — DEXTROSE, SODIUM CHLORIDE, SODIUM LACTATE, POTASSIUM CHLORIDE, AND CALCIUM CHLORIDE 5; .6; .31; .03; .02 G/100ML; G/100ML; G/100ML; G/100ML; G/100ML
50 INJECTION, SOLUTION INTRAVENOUS CONTINUOUS
Status: DISCONTINUED | OUTPATIENT
Start: 2020-08-25 | End: 2020-08-26

## 2020-08-25 RX ORDER — DEXAMETHASONE SODIUM PHOSPHATE 4 MG/ML
INJECTION, SOLUTION INTRA-ARTICULAR; INTRALESIONAL; INTRAMUSCULAR; INTRAVENOUS; SOFT TISSUE AS NEEDED
Status: DISCONTINUED | OUTPATIENT
Start: 2020-08-25 | End: 2020-08-25 | Stop reason: HOSPADM

## 2020-08-25 RX ORDER — SODIUM CHLORIDE 0.9 % (FLUSH) 0.9 %
5-40 SYRINGE (ML) INJECTION AS NEEDED
Status: DISCONTINUED | OUTPATIENT
Start: 2020-08-25 | End: 2020-08-25 | Stop reason: HOSPADM

## 2020-08-25 RX ORDER — ACETAMINOPHEN 325 MG/1
650 TABLET ORAL
Status: DISCONTINUED | OUTPATIENT
Start: 2020-08-25 | End: 2020-08-27 | Stop reason: HOSPADM

## 2020-08-25 RX ORDER — ALBUTEROL SULFATE 0.83 MG/ML
2.5 SOLUTION RESPIRATORY (INHALATION) AS NEEDED
Status: DISCONTINUED | OUTPATIENT
Start: 2020-08-25 | End: 2020-08-25 | Stop reason: HOSPADM

## 2020-08-25 RX ORDER — OXYCODONE AND ACETAMINOPHEN 5; 325 MG/1; MG/1
1-2 TABLET ORAL
Qty: 20 TAB | Refills: 0 | Status: SHIPPED | OUTPATIENT
Start: 2020-08-25 | End: 2020-08-26

## 2020-08-25 RX ORDER — SODIUM CHLORIDE 0.9 % (FLUSH) 0.9 %
5-40 SYRINGE (ML) INJECTION EVERY 8 HOURS
Status: DISCONTINUED | OUTPATIENT
Start: 2020-08-25 | End: 2020-08-25 | Stop reason: HOSPADM

## 2020-08-25 RX ORDER — DIPHENHYDRAMINE HYDROCHLORIDE 50 MG/ML
12.5 INJECTION, SOLUTION INTRAMUSCULAR; INTRAVENOUS
Status: DISCONTINUED | OUTPATIENT
Start: 2020-08-25 | End: 2020-08-27 | Stop reason: HOSPADM

## 2020-08-25 RX ORDER — DOCUSATE SODIUM 100 MG/1
100 CAPSULE, LIQUID FILLED ORAL 2 TIMES DAILY
Status: DISCONTINUED | OUTPATIENT
Start: 2020-08-25 | End: 2020-08-27 | Stop reason: HOSPADM

## 2020-08-25 RX ORDER — ROCURONIUM BROMIDE 10 MG/ML
INJECTION, SOLUTION INTRAVENOUS AS NEEDED
Status: DISCONTINUED | OUTPATIENT
Start: 2020-08-25 | End: 2020-08-25 | Stop reason: HOSPADM

## 2020-08-25 RX ORDER — ONDANSETRON 2 MG/ML
INJECTION INTRAMUSCULAR; INTRAVENOUS AS NEEDED
Status: DISCONTINUED | OUTPATIENT
Start: 2020-08-25 | End: 2020-08-25 | Stop reason: HOSPADM

## 2020-08-25 RX ORDER — SODIUM CHLORIDE 0.9 % (FLUSH) 0.9 %
5-40 SYRINGE (ML) INJECTION AS NEEDED
Status: DISCONTINUED | OUTPATIENT
Start: 2020-08-25 | End: 2020-08-27 | Stop reason: HOSPADM

## 2020-08-25 RX ORDER — LOSARTAN POTASSIUM 50 MG/1
100 TABLET ORAL DAILY
Status: DISCONTINUED | OUTPATIENT
Start: 2020-08-26 | End: 2020-08-27 | Stop reason: HOSPADM

## 2020-08-25 RX ORDER — OXYCODONE AND ACETAMINOPHEN 7.5; 325 MG/1; MG/1
1 TABLET ORAL
Status: DISCONTINUED | OUTPATIENT
Start: 2020-08-25 | End: 2020-08-27 | Stop reason: HOSPADM

## 2020-08-25 RX ORDER — BUPIVACAINE HYDROCHLORIDE 2.5 MG/ML
INJECTION, SOLUTION EPIDURAL; INFILTRATION; INTRACAUDAL AS NEEDED
Status: DISCONTINUED | OUTPATIENT
Start: 2020-08-25 | End: 2020-08-25 | Stop reason: HOSPADM

## 2020-08-25 RX ORDER — SODIUM CHLORIDE 9 MG/ML
250 INJECTION, SOLUTION INTRAVENOUS AS NEEDED
Status: DISCONTINUED | OUTPATIENT
Start: 2020-08-25 | End: 2020-08-25

## 2020-08-25 RX ORDER — DIPHENHYDRAMINE HYDROCHLORIDE 50 MG/ML
12.5 INJECTION, SOLUTION INTRAMUSCULAR; INTRAVENOUS AS NEEDED
Status: DISCONTINUED | OUTPATIENT
Start: 2020-08-25 | End: 2020-08-25 | Stop reason: HOSPADM

## 2020-08-25 RX ORDER — ONDANSETRON 2 MG/ML
4 INJECTION INTRAMUSCULAR; INTRAVENOUS AS NEEDED
Status: DISCONTINUED | OUTPATIENT
Start: 2020-08-25 | End: 2020-08-25 | Stop reason: HOSPADM

## 2020-08-25 RX ORDER — FENTANYL CITRATE 50 UG/ML
25 INJECTION, SOLUTION INTRAMUSCULAR; INTRAVENOUS
Status: DISCONTINUED | OUTPATIENT
Start: 2020-08-25 | End: 2020-08-25 | Stop reason: HOSPADM

## 2020-08-25 RX ORDER — NALOXONE HYDROCHLORIDE 0.4 MG/ML
0.4 INJECTION, SOLUTION INTRAMUSCULAR; INTRAVENOUS; SUBCUTANEOUS AS NEEDED
Status: DISCONTINUED | OUTPATIENT
Start: 2020-08-25 | End: 2020-08-27 | Stop reason: HOSPADM

## 2020-08-25 RX ORDER — MONTELUKAST SODIUM 10 MG/1
10 TABLET ORAL DAILY
Status: DISCONTINUED | OUTPATIENT
Start: 2020-08-26 | End: 2020-08-27 | Stop reason: HOSPADM

## 2020-08-25 RX ADMIN — MANNITOL: 20 INJECTION, SOLUTION INTRAVENOUS at 14:47

## 2020-08-25 RX ADMIN — DOCUSATE SODIUM 100 MG: 100 CAPSULE, LIQUID FILLED ORAL at 22:26

## 2020-08-25 RX ADMIN — FENTANYL CITRATE 100 MCG: 0.05 INJECTION, SOLUTION INTRAMUSCULAR; INTRAVENOUS at 12:54

## 2020-08-25 RX ADMIN — CEFAZOLIN SODIUM 2 G: 1 POWDER, FOR SOLUTION INTRAMUSCULAR; INTRAVENOUS at 13:23

## 2020-08-25 RX ADMIN — PROPOFOL 170 MG: 10 INJECTION, EMULSION INTRAVENOUS at 12:54

## 2020-08-25 RX ADMIN — SODIUM CHLORIDE, SODIUM LACTATE, POTASSIUM CHLORIDE, AND CALCIUM CHLORIDE 100 ML/HR: 600; 310; 30; 20 INJECTION, SOLUTION INTRAVENOUS at 12:38

## 2020-08-25 RX ADMIN — DEXAMETHASONE SODIUM PHOSPHATE 8 MG: 4 INJECTION, SOLUTION INTRAMUSCULAR; INTRAVENOUS at 15:56

## 2020-08-25 RX ADMIN — MIDAZOLAM HYDROCHLORIDE 2 MG: 2 INJECTION, SOLUTION INTRAMUSCULAR; INTRAVENOUS at 12:47

## 2020-08-25 RX ADMIN — SUGAMMADEX 200 MG: 100 INJECTION, SOLUTION INTRAVENOUS at 16:00

## 2020-08-25 RX ADMIN — ROCURONIUM BROMIDE 10 MG: 10 INJECTION INTRAVENOUS at 14:17

## 2020-08-25 RX ADMIN — SODIUM CHLORIDE, SODIUM LACTATE, POTASSIUM CHLORIDE, CALCIUM CHLORIDE, AND DEXTROSE MONOHYDRATE 125 ML/HR: 600; 310; 30; 20; 5 INJECTION, SOLUTION INTRAVENOUS at 16:43

## 2020-08-25 RX ADMIN — Medication 10 ML: at 22:32

## 2020-08-25 RX ADMIN — ROCURONIUM BROMIDE 20 MG: 10 INJECTION INTRAVENOUS at 14:57

## 2020-08-25 RX ADMIN — ROCURONIUM BROMIDE 40 MG: 10 INJECTION INTRAVENOUS at 12:54

## 2020-08-25 RX ADMIN — ONDANSETRON HYDROCHLORIDE 4 MG: 2 SOLUTION INTRAMUSCULAR; INTRAVENOUS at 15:56

## 2020-08-25 RX ADMIN — HYDROMORPHONE HYDROCHLORIDE 0.5 MG: 2 INJECTION INTRAMUSCULAR; INTRAVENOUS; SUBCUTANEOUS at 16:03

## 2020-08-25 RX ADMIN — HYDROMORPHONE HYDROCHLORIDE 0.5 MG: 1 INJECTION, SOLUTION INTRAMUSCULAR; INTRAVENOUS; SUBCUTANEOUS at 17:23

## 2020-08-25 RX ADMIN — OXYCODONE AND ACETAMINOPHEN 1 TABLET: 7.5; 325 TABLET ORAL at 20:42

## 2020-08-25 RX ADMIN — SODIUM CHLORIDE, SODIUM LACTATE, POTASSIUM CHLORIDE, AND CALCIUM CHLORIDE: 600; 310; 30; 20 INJECTION, SOLUTION INTRAVENOUS at 12:47

## 2020-08-25 RX ADMIN — HYDROMORPHONE HYDROCHLORIDE 0.5 MG: 2 INJECTION INTRAMUSCULAR; INTRAVENOUS; SUBCUTANEOUS at 13:05

## 2020-08-25 RX ADMIN — WATER 1 G: 1 INJECTION INTRAMUSCULAR; INTRAVENOUS; SUBCUTANEOUS at 22:26

## 2020-08-25 RX ADMIN — HYDROMORPHONE HYDROCHLORIDE 0.5 MG: 1 INJECTION, SOLUTION INTRAMUSCULAR; INTRAVENOUS; SUBCUTANEOUS at 16:49

## 2020-08-25 RX ADMIN — HYDROMORPHONE HYDROCHLORIDE 0.5 MG: 2 INJECTION INTRAMUSCULAR; INTRAVENOUS; SUBCUTANEOUS at 13:35

## 2020-08-25 RX ADMIN — LIDOCAINE HYDROCHLORIDE 60 MG: 20 INJECTION, SOLUTION EPIDURAL; INFILTRATION; INTRACAUDAL; PERINEURAL at 12:54

## 2020-08-25 NOTE — PROGRESS NOTES
Bedside shift change report given to Annalisa Estrada RN (oncoming nurse) by Darian Maki RN (offgoing nurse). Report included the following information SBAR, Kardex, Intake/Output, MAR and Accordion.

## 2020-08-25 NOTE — H&P
No change from pre-operative H&P. Recent left hip replacement. Doing well. Will proceed with robotic left partial nephrectomy, retro vs intraperitoneal. Risks reviewed again including bleeding, infection, urinary fistula, damage to surrounding structures. She understands increased risk of total nephrectomy based on location and that the lesion may be benign 50% of the time. She voiced understanding and agrees to proceed.

## 2020-08-25 NOTE — ANESTHESIA PREPROCEDURE EVALUATION
Anesthetic History   No history of anesthetic complications            Review of Systems / Medical History  Patient summary reviewed and pertinent labs reviewed    Pulmonary          Smoker  Asthma        Neuro/Psych   Within defined limits           Cardiovascular    Hypertension                   GI/Hepatic/Renal         Renal disease      Comments: Renal cyst Endo/Other        Arthritis     Other Findings   Comments: Renal cyst         Physical Exam    Airway  Mallampati: II  TM Distance: 4 - 6 cm  Neck ROM: normal range of motion   Mouth opening: Normal     Cardiovascular  Regular rate and rhythm,  S1 and S2 normal,  no murmur, click, rub, or gallop  Rhythm: regular  Rate: normal         Dental    Dentition: Upper partial plate     Pulmonary  Breath sounds clear to auscultation               Abdominal  GI exam deferred       Other Findings            Anesthetic Plan    ASA: 2  Anesthesia type: general          Induction: Intravenous  Anesthetic plan and risks discussed with: Patient

## 2020-08-25 NOTE — ANESTHESIA POSTPROCEDURE EVALUATION
Procedure(s):  ROBOTIC LEFT RETROPERITONEAL PARTIAL NEPHRECTOMY. general    Anesthesia Post Evaluation      Multimodal analgesia: multimodal analgesia not used between 6 hours prior to anesthesia start to PACU discharge  Patient location during evaluation: PACU  Patient participation: complete - patient participated  Level of consciousness: awake  Pain management: adequate  Airway patency: patent  Anesthetic complications: no  Cardiovascular status: acceptable, blood pressure returned to baseline and hemodynamically stable  Respiratory status: acceptable  Hydration status: acceptable  Post anesthesia nausea and vomiting:  controlled  Final Post Anesthesia Temperature Assessment:  Normothermia (36.0-37.5 degrees C)      INITIAL Post-op Vital signs:   Vitals Value Taken Time   /72 8/25/2020  5:10 PM   Temp 36.8 °C (98.2 °F) 8/25/2020  4:42 PM   Pulse 91 8/25/2020  5:15 PM   Resp 12 8/25/2020  5:15 PM   SpO2 97 % 8/25/2020  5:15 PM   Vitals shown include unvalidated device data.

## 2020-08-25 NOTE — BRIEF OP NOTE
Brief Postoperative Note    Patient: Paul Bowman  YOB: 1955  MRN: [id number]    Date of Procedure: 8/25/2020     Pre-Op Diagnosis: RENAL MASS    Post-Op Diagnosis: Same as preoperative diagnosis. Procedure(s):  ROBOTIC LEFT RETROPERITONEAL PARTIAL NEPHRECTOMY POSSIBLE RADICAL NEPHRECTOMY    Surgeon(s):  MD Ana Leal MD    Surgical Assistant: None    Anesthesia: General     Estimated Blood Loss (mL): Minimal    Complications: None    Specimens:   ID Type Source Tests Collected by Time Destination   1 : left renal mass Preservative Kidney, Left  Alia Caballero MD 8/25/2020 1600 Pathology        Implants:   Implant Name Type Inv.  Item Serial No.  Lot No. LRB No. Used Action   vistaseal   [id number] [facility] [id number] N/A 1 Implanted       Drains:   [place] Drain 08/25/20 Left Abdomen (Active)       Findings: renal mass excised    Electronically Signed by Lisa Warren MD on 8/25/2020 at 4:02 PM

## 2020-08-26 LAB
ABO + RH BLD: NORMAL
ANION GAP SERPL CALC-SCNC: 5 MMOL/L (ref 5–15)
BLD PROD TYP BPU: NORMAL
BLD PROD TYP BPU: NORMAL
BLOOD GROUP ANTIBODIES SERPL: NORMAL
BPU ID: NORMAL
BPU ID: NORMAL
BUN SERPL-MCNC: 10 MG/DL (ref 6–20)
BUN/CREAT SERPL: 10 (ref 12–20)
CALCIUM SERPL-MCNC: 8.3 MG/DL (ref 8.5–10.1)
CHLORIDE SERPL-SCNC: 108 MMOL/L (ref 97–108)
CO2 SERPL-SCNC: 26 MMOL/L (ref 21–32)
CREAT SERPL-MCNC: 0.97 MG/DL (ref 0.55–1.02)
CROSSMATCH RESULT,%XM: NORMAL
CROSSMATCH RESULT,%XM: NORMAL
ERYTHROCYTE [DISTWIDTH] IN BLOOD BY AUTOMATED COUNT: 13.6 % (ref 11.5–14.5)
GLUCOSE SERPL-MCNC: 151 MG/DL (ref 65–100)
HCT VFR BLD AUTO: 32.3 % (ref 35–47)
HGB BLD-MCNC: 10.7 G/DL (ref 11.5–16)
MCH RBC QN AUTO: 31.7 PG (ref 26–34)
MCHC RBC AUTO-ENTMCNC: 33.1 G/DL (ref 30–36.5)
MCV RBC AUTO: 95.6 FL (ref 80–99)
NRBC # BLD: 0 K/UL (ref 0–0.01)
NRBC BLD-RTO: 0 PER 100 WBC
PLATELET # BLD AUTO: 231 K/UL (ref 150–400)
PMV BLD AUTO: 9.4 FL (ref 8.9–12.9)
POTASSIUM SERPL-SCNC: 3.9 MMOL/L (ref 3.5–5.1)
RBC # BLD AUTO: 3.38 M/UL (ref 3.8–5.2)
SODIUM SERPL-SCNC: 139 MMOL/L (ref 136–145)
SPECIMEN EXP DATE BLD: NORMAL
STATUS OF UNIT,%ST: NORMAL
STATUS OF UNIT,%ST: NORMAL
UNIT DIVISION, %UDIV: 0
UNIT DIVISION, %UDIV: 0
WBC # BLD AUTO: 11.9 K/UL (ref 3.6–11)

## 2020-08-26 PROCEDURE — 74011250636 HC RX REV CODE- 250/636: Performed by: UROLOGY

## 2020-08-26 PROCEDURE — 36415 COLL VENOUS BLD VENIPUNCTURE: CPT

## 2020-08-26 PROCEDURE — 80048 BASIC METABOLIC PNL TOTAL CA: CPT

## 2020-08-26 PROCEDURE — 74011000250 HC RX REV CODE- 250: Performed by: UROLOGY

## 2020-08-26 PROCEDURE — 74011250637 HC RX REV CODE- 250/637: Performed by: UROLOGY

## 2020-08-26 PROCEDURE — 65270000029 HC RM PRIVATE

## 2020-08-26 PROCEDURE — 85027 COMPLETE CBC AUTOMATED: CPT

## 2020-08-26 PROCEDURE — 74011250636 HC RX REV CODE- 250/636: Performed by: NURSE PRACTITIONER

## 2020-08-26 RX ORDER — KETOROLAC TROMETHAMINE 30 MG/ML
15 INJECTION, SOLUTION INTRAMUSCULAR; INTRAVENOUS
Status: COMPLETED | OUTPATIENT
Start: 2020-08-26 | End: 2020-08-26

## 2020-08-26 RX ORDER — TRAMADOL HYDROCHLORIDE 50 MG/1
50 TABLET ORAL
Qty: 12 TAB | Refills: 0 | Status: SHIPPED | OUTPATIENT
Start: 2020-08-26 | End: 2020-08-29

## 2020-08-26 RX ADMIN — DOCUSATE SODIUM 100 MG: 100 CAPSULE, LIQUID FILLED ORAL at 17:17

## 2020-08-26 RX ADMIN — ACETAMINOPHEN 650 MG: 325 TABLET ORAL at 21:48

## 2020-08-26 RX ADMIN — ATORVASTATIN CALCIUM 40 MG: 20 TABLET, FILM COATED ORAL at 09:09

## 2020-08-26 RX ADMIN — OXYCODONE AND ACETAMINOPHEN 1 TABLET: 7.5; 325 TABLET ORAL at 05:20

## 2020-08-26 RX ADMIN — WATER 1 G: 1 INJECTION INTRAMUSCULAR; INTRAVENOUS; SUBCUTANEOUS at 15:32

## 2020-08-26 RX ADMIN — SODIUM CHLORIDE, SODIUM LACTATE, POTASSIUM CHLORIDE, CALCIUM CHLORIDE, AND DEXTROSE MONOHYDRATE 125 ML/HR: 600; 310; 30; 20; 5 INJECTION, SOLUTION INTRAVENOUS at 00:27

## 2020-08-26 RX ADMIN — OXYCODONE AND ACETAMINOPHEN 1 TABLET: 7.5; 325 TABLET ORAL at 15:38

## 2020-08-26 RX ADMIN — LOSARTAN POTASSIUM 100 MG: 50 TABLET, FILM COATED ORAL at 09:09

## 2020-08-26 RX ADMIN — Medication 10 ML: at 15:40

## 2020-08-26 RX ADMIN — WATER 1 G: 1 INJECTION INTRAMUSCULAR; INTRAVENOUS; SUBCUTANEOUS at 05:33

## 2020-08-26 RX ADMIN — KETOROLAC TROMETHAMINE 15 MG: 30 INJECTION, SOLUTION INTRAMUSCULAR at 09:09

## 2020-08-26 RX ADMIN — DOCUSATE SODIUM 100 MG: 100 CAPSULE, LIQUID FILLED ORAL at 09:09

## 2020-08-26 RX ADMIN — MONTELUKAST 10 MG: 10 TABLET, FILM COATED ORAL at 09:09

## 2020-08-26 RX ADMIN — Medication 10 ML: at 20:28

## 2020-08-26 RX ADMIN — HYDROCHLOROTHIAZIDE 12.5 MG: 25 TABLET ORAL at 09:09

## 2020-08-26 NOTE — PROGRESS NOTES
Urology Progress Note    Admit Date:  8/25/2020    Admit Dx: Renal mass, left [N28.89]        SUBJECTIVE:     Patient seen and examined this evening. +flatus, minimal pain. No significant ambulation. Doing well overall. OBJECTIVE:     Vitals:  Patient Vitals for the past 8 hrs:   BP Temp Pulse Resp SpO2   08/26/20 1720 102/60 98.3 °F (36.8 °C) 87 17 97 %   08/26/20 1042 112/65 98.6 °F (37 °C) 82 17 97 %   [id number] 1901 - 08/26 0700  In: 1862.5 [I.V.:1862.5]  Out: 1060 [Urine:830; Drains:130]     Last 8 hours  08/26 0701 - 08/26 1900  In: 500 [P.O.:500]  Out: 35 [Drains:35]    Drain Output (last 8hr):  [REMOVED] Noah-Rome Drain 08/25/20 Left Abdomen-Output (ml): 10 ml      Physical Exam:   Nad, aaox3  Resp: normal effort  Abd: soft, NT, ND, left flank inc c/d/i, echymosis    Labs:  CBC:   Lab Results   Component Value Date/Time    HGB 10.7 (L) 08/26/2020 05:05 AM    HCT 32.3 (L) 08/26/2020 05:05 AM     BMP:   Lab Results   Component Value Date/Time    BUN 10 08/26/2020 05:05 AM    Creatinine 0.97 08/26/2020 05:05 AM             Assessment:     Procedure(s):  ROBOTIC LEFT RETROPERITONEAL PARTIAL NEPHRECTOMY      Plan:     - STEPHEN now out, voiding  - OOB, ambulation.  Discussed with nursing  - likely d/c home tomorrow              Signed By: Addie Barrios MD - August 26, 2020

## 2020-08-26 NOTE — PROGRESS NOTES
Reason for Admission:   Renal Mass                   RUR Score:     8%           Plan for utilizing home health:   Unsure at current time. Current Advanced Directive/Advance Care Plan:  Prior, No ACP on file. Transition of Care Plan:    I met with the patient while wearing a mask. Patient states she lives alone in a 1 story home with 6 steps to enter. [facility]. DME-Has RW, uses cane. Patient is independent on all ADL's and drives. Scripts @ [facility] on [address]. PLAN:  1. Monitor patients response to treatment. 2. Patient will be transported by family home. 3. Await for any disposition needs. 4. Case Management to follow.     Care Management Interventions  PCP Verified by CM: Yes(Dr. Joaquina Ghotra)  Transition of Care Consult (CM Consult): Discharge Planning  MyChart Signup: No  Discharge Durable Medical Equipment: No  Health Maintenance Reviewed: Yes  Physical Therapy Consult: No  Occupational Therapy Consult: No  Speech Therapy Consult: No  Current Support Network: Own Home  Confirm Follow Up Transport: Self  Discharge Location  Discharge Placement: Home  [facility]

## 2020-08-26 NOTE — PROGRESS NOTES
Urology Progress Note    Subjective:     Daily Progress Note: 2020 7:43 AM    Lily Carrillo is 1 Day Post-Op and doing fair. She reports pain is moderately controlled. She has no complaints. She is tolerating clear liquids and has not ambulated. Indwelling catheter is draining well. Having some pain this morning. Tolerating sips. STEPHEN 130cc, Urine 700cc. Labs ok, VSS. Objective:     Visit Vitals  /67 (BP 1 Location: Left arm, BP Patient Position: At rest)   Pulse 97   Temp 98.5 °F (36.9 °C)   Resp 17   Ht 4' 11\" (1.499 m)   Wt 59.9 kg (132 lb)   SpO2 100%   BMI 26.66 kg/m²        Temp (24hrs), Av.1 °F (36.7 °C), Min:97.5 °F (36.4 °C), Max:98.5 °F (36.9 °C)      Intake and Output:   1901 -  0700  In: 1862.5 [I.V.:1862.5]  Out: 4195 [Urine:830; Drains:130]  No intake/output data recorded.     Physical Exam:   General appearance: alert, cooperative, no distress, appears stated age  Respiratory[de-identified] on O2, no distress  Abdomen: soft, appropriately tender    Incision: clean, dry and STEPHEN with ss drainage    Data Review:    Recent Results (from the past 24 hour(s))   METABOLIC PANEL, BASIC    Collection Time: 20  5:05 AM   Result Value Ref Range    Sodium 139 136 - 145 mmol/L    Potassium 3.9 3.5 - 5.1 mmol/L    Chloride 108 97 - 108 mmol/L    CO2 26 21 - 32 mmol/L    Anion gap 5 5 - 15 mmol/L    Glucose 151 (H) 65 - 100 mg/dL    BUN 10 6 - 20 MG/DL    Creatinine 0.97 0.55 - 1.02 MG/DL    BUN/Creatinine ratio 10 (L) 12 - 20      GFR est AA >60 >60 ml/min/1.73m2    GFR est non-AA 58 (L) >60 ml/min/1.73m2    Calcium 8.3 (L) 8.5 - 10.1 MG/DL   CBC W/O DIFF    Collection Time: 20  5:05 AM   Result Value Ref Range    WBC 11.9 (H) 3.6 - 11.0 K/uL    RBC 3.38 (L) 3.80 - 5.20 M/uL    HGB 10.7 (L) 11.5 - 16.0 g/dL    HCT 32.3 (L) 35.0 - 47.0 %    MCV 95.6 80.0 - 99.0 FL    MCH 31.7 26.0 - 34.0 PG    MCHC 33.1 30.0 - 36.5 g/dL    RDW 13.6 11.5 - 14.5 %    PLATELET 462 313 - 175 K/uL    MPV 9.4 8.9 - 12.9 FL    NRBC 0.0 0  WBC    ABSOLUTE NRBC 0.00 0.00 - 0.01 K/uL       Assessment/Plan:     Active Problems:    Renal mass, left (8/25/2020)        Status Post:  Procedure(s):  ROBOTIC LEFT RETROPERITONEAL PARTIAL NEPHRECTOMY     Plan:   DC fatima  Ambulate  Monitor STEPHEN output  Incentive Spirometer  Advance diet as tolerated  Slow IV fluids  Toradol now    Signed By: Harsha Yousif NP                         August 26, 2020

## 2020-08-26 NOTE — PROGRESS NOTES
Bedside shift change report given to Pantera Cohen RN (oncoming nurse) by Amy Pablo RN (offgoing nurse). Report included the following information SBAR, Kardex, MAR, Accordion and Recent Results.

## 2020-08-26 NOTE — PROGRESS NOTES
Bedside and Verbal shift change report given to Aman Patel (oncoming nurse) by Lety Burrows (offgoing nurse). Report included the following information SBAR, Kardex and MAR.

## 2020-08-27 VITALS
OXYGEN SATURATION: 99 % | BODY MASS INDEX: 26.61 KG/M2 | HEART RATE: 102 BPM | SYSTOLIC BLOOD PRESSURE: 127 MMHG | DIASTOLIC BLOOD PRESSURE: 62 MMHG | TEMPERATURE: 98.6 F | HEIGHT: 59 IN | WEIGHT: 132 LBS | RESPIRATION RATE: 19 BRPM

## 2020-08-27 PROCEDURE — 74011250637 HC RX REV CODE- 250/637: Performed by: UROLOGY

## 2020-08-27 RX ADMIN — LOSARTAN POTASSIUM 100 MG: 50 TABLET, FILM COATED ORAL at 08:52

## 2020-08-27 RX ADMIN — OXYCODONE AND ACETAMINOPHEN 1 TABLET: 7.5; 325 TABLET ORAL at 11:45

## 2020-08-27 RX ADMIN — ATORVASTATIN CALCIUM 40 MG: 20 TABLET, FILM COATED ORAL at 08:52

## 2020-08-27 RX ADMIN — DOCUSATE SODIUM 100 MG: 100 CAPSULE, LIQUID FILLED ORAL at 08:52

## 2020-08-27 RX ADMIN — HYDROCHLOROTHIAZIDE 12.5 MG: 25 TABLET ORAL at 08:52

## 2020-08-27 RX ADMIN — MONTELUKAST 10 MG: 10 TABLET, FILM COATED ORAL at 08:52

## 2020-08-27 NOTE — OP NOTES
[facility] 80  OPERATIVE REPORT    Name:  Sona Hardy  MR#:  [id number]  :  1955  ACCOUNT #:  [de-identified]  DATE OF SERVICE:  2020    SERVICE:  Urology. PREOPERATIVE DIAGNOSIS:  Left renal mass. POSTOPERATIVE DIAGNOSIS:  Left renal mass. PROCEDURE PERFORMED:  Robotic-assisted laparoscopic left retroperitoneal partial nephrectomy. SURGEON:  Estrella Campoverde MD    ASSISTANT:  Jimmy Pitt MD:  Present and scrubbed for port placement, bedside assist throughout the case including artery clamping and extraction. ANESTHESIA:  1. General endotracheal.  2.  0.5% Marcaine plain, 30 mL total.    COMPLICATIONS:  None. SPECIMENS REMOVED:  Left renal mass. IMPLANTS:  none    ESTIMATED BLOOD LOSS:  25 mL. TUBES AND DRAINS:  1.  A 16-Kinyarwanda Villarreal catheter. 2.  A 15-Kinyarwanda Noah-Rome drain. PATIENT CONDITION FOLLOWING PROCEDURE:  Stable. 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 Because of growth of the lesion and the 50% risk of cancer, she elected for surgery. Based on the location, she understands the risks including bleeding, urinary fistula formation, as well as possibility of a total nephrectomy. PROCEDURE IN DETAIL:  After appropriate consent was obtained and history and physical reviewed, the patient was brought back to the operating room and placed in supine position. After successful induction of general anesthesia, a Villarreal catheter was placed and she was repositioned in the lateral decubitus position, left side up. Care was taken during positioning due to a recent left hip surgery. The bed was fully flexed again, supporting the leg to ensure it remained in anatomical position and all pressure points padded.   She was secured to the bed with padding over the hip and prepped and draped in the usual sterile fashion for retroperitoneal left kidney surgery. A time-out was performed, antibiotics confirmed at that time. Using a 15-blade scalpel, a 3-cm incision was made just inferior to the tip of the 12th rib. Further dissection was carried down to the muscle using Bovie cautery. The retroperitoneal space was then entered using a Latisha clamp with further dissection achieved using finger dissection. Next, balloon dilation was performed directly below the kidney. The kidney was felt to be up against the sidewall and so this was only done once prior to placement of balloon port for inspection with the camera. No complication was noted after entry. An 8-mm port was placed more laterally under visual guidance. This allowed us to use a Kittner to dissect back the intraperitoneal fold in order to place additional two robotic arms more medially under direct visualization. Finally, a 12-mm assistant port was also placed prior to docking the robot. The pressure was checked to ensure no compression prior to proceeding. Gerota's fascia was first entered and dissection carried up the psoas muscle until the renal hilum could be appreciated. The renal artery right off of the aorta was seen and dissected out circumferentially for clamping. This was done without any issues. Next, an ultrasound was used to locate the large cystic mass which was seen just above the hilum. The kidney was then defatted around this area in order to fully appreciate this lesion. Dissection was very carefully done posteriorly due to a branching artery right at the corner of the mass as well as the ureter at the other side which was carefully dissected around as well to prevent injury. Once this was circumferentially dissected out, an ultrasound was again used to donovan out the borders prior to resection. 12.5 g of mannitol was given at that time.     The renal artery was placed on clamp and the tumor completely excised, taking care to keep a visible negative margin which was appreciated. Posterior dissection was able to be performed while lifting the tumor off of the collecting system. This was only entered in one area towards the visible left side which was later closed. Once the tumor was completely removed, it was placed aside. First, using a 3-0 V-Loc suture, the parenchyma was sutured in order to close any open vessels. The collecting system that was open was also closed during that time. Next, a 0 Vicryl suture was used to reapproximate the parenchyma the best we could. Based on the location including the location of the ureter, this was achieved in a star-like fashion using clips as the suture exited the parenchyma in order to tighten down and help with hemostasis. Again, the ureter was appreciated away from this closure, not being kinked. It should be noted there were no resections to branches of the renal artery that needed to be clamped as they were going right into the tumor. At the completion of this renorrhaphy with the clamp time at 30 minutes, the clamp was removed. Hemostasis was seen to be rather good. Some Fibrillar and VistaSeal were used to help with hemostasis which was found to be excellent thereafter. After this was inspected for over 10 minutes to ensure good hemostasis including on low pressure, the fat was placed back over the defect. The specimen was placed in a collection bag and removed through the camera port successfully. All ports were then removed under visual guidance. It was noted that there must have been a small hole within the intraperitoneal space as this needed to be suctioned out prior to removal of all ports. The two 12-mm ports were closed using 0 Vicryl suture in interrupted fashion. All ports were closed with 4-0 Monocryl suture in running fashion and Dermabond. Local anesthetic was instilled at the completion of the case.   A drain was placed through the most medial port to look for a urinary leak overnight. This was secured to the skin using a 2-0 nylon suture. All instrument, sponge, and needle counts were correct x2 at the completion of the case. The patient was taken out of general anesthesia without complication and brought to the PACU in stable condition. DISPOSITION:  The patient will be transferred to the floor for postoperative care.       MD MADI Rivera/[id number]/  D:  08/26/2020 18:53  T:  08/27/2020 2:14  JOB #:  [id number]

## 2020-08-27 NOTE — ROUTINE PROCESS
2040: Pt completed 1.5 laps in hallway. Patient did not experience any pain. Pt mentions stomach feeling tight. Activity tolerated well. Returned safely back to bed. Incisions and pressure dressings checked. 2145: Pt completed 2 laps in hallway. Pt notes a little discomfort. Tylenol given when returned to bed. Activity tolerated well. Returned safely back to bed. Incisions and pressure dressings checked. Pt states she is ready for bed and would like to get some rest. Lights dimmed.

## 2020-08-27 NOTE — DISCHARGE SUMMARY
Urology Discharge Summary    Patient: Ale Prakash MRN: [id number]  SSN: [id number]    YOB: 1955  Age: 62 y.o. Sex: female               ADMISSION:  to Ros[facility] Ivory MD by Thelma Gonzales MD  8/25/2020 ADMISSION DIAGNOSIS: Renal mass, left [N28.89]  8/27/2020 DISCHARGE DIAGNOSIS: [x]    Same  CONSULTS: None  PROCEDURES: POD# 2 Days Post-Op Procedure(s):  ROBOTIC LEFT RETROPERITONEAL PARTIAL NEPHRECTOMY    RECENT LABS: No results found for this or any previous visit (from the past 24 hour(s)). HOSPITAL COURSE: [x]    Uncomplicated. COMPLICATIONS: [x]    None identified  DISCHARGE TO:     [x]    Home  []    Rehab []    SNF  FOLLOWUP: one week  DISCHARGE MEDS:     [x]    IT IS INTENDED THAT YOU CONTINUE TO TAKE YOUR PRIOR MEDICATIONS WITHOUT CHANGES WITH THE EXCEPTION OF:  []    NONE    Current Discharge Medication List      START taking these medications    Details   traMADoL (ULTRAM) 50 mg tablet Take 1 Tab by mouth every six (6) hours as needed for Pain for up to 3 days. Max Daily Amount: 200 mg. Qty: 12 Tab, Refills: 0    Associated Diagnoses: Renal mass, left         CONTINUE these medications which have NOT CHANGED    Details   losartan (COZAAR) 100 mg tablet Take 100 mg by mouth daily. montelukast (Singulair) 10 mg tablet Take 10 mg by mouth daily. atorvastatin (LIPITOR) 40 mg tablet Take 40 mg by mouth daily. Hydrochlorothiazide (HYDRODIURIL) 12.5 mg tablet Take 1 tablet by mouth daily. Needs to schedule ov before further refills. Indications: HYPERTENSION  Qty: 30 tablet, Refills: 2    Associated Diagnoses: Essential hypertension, benign      cholecalciferol (VITAMIN D3) 1,000 unit cap Take 1,000 Units by mouth daily. albuterol (PROVENTIL HFA, VENTOLIN HFA, PROAIR HFA) 90 mcg/actuation inhaler Take 2 puffs by inhalation every six (6) hours as needed for Wheezing.   Qty: 1 Inhaler, Refills: 0         STOP taking these medications       indomethacin (INDOCIN) 25 mg capsule Comments:   Reason for Stopping:                   Shivani Bermudez NP 8/27/2020 1:05 PM

## 2020-08-27 NOTE — PROGRESS NOTES
I have reviewed discharge instructions with the patient. The patient verbalized understanding. Discharge medications reviewed with patient and appropriate educational materials and side effects teaching were provided. Physical copy of AVS signed and placed in patient's chart.

## 2020-08-27 NOTE — PROGRESS NOTES
Hospital follow-up PCP transitional care appointment has been scheduled with Dr. Lynette Kirby for Wednesday, 9/2/20 at 10:30 a.m. Pending patient discharge.   Pita John, Care Management Specialist.

## 2020-08-27 NOTE — ROUTINE PROCESS
Bedside shift change report given to Bill Olivarez RN (oncoming nurse) by Jamaica Angeles RN (offgoing nurse). Report included the following information SBAR, Kardex and MAR.

## 2020-08-27 NOTE — PROGRESS NOTES
Urology Progress Note    Subjective:     Daily Progress Note: 2020 1:02 PM    Tamika Newman is 2 Days Post-Op and doing excellent. She reports pain is well controlled. She has no complaints. She is tolerating a solid diet and ambulating without assistance. Patient is voiding without difficulty. Objective:     Visit Vitals  /62 (BP 1 Location: Right arm, BP Patient Position: Sitting;Post activity)   Pulse (!) 102   Temp 98.6 °F (37 °C)   Resp 19   Ht 4' 11\" (1.499 m)   Wt 59.9 kg (132 lb)   SpO2 99%   BMI 26.66 kg/m²        Temp (24hrs), Av.1 °F (36.7 °C), Min:97.6 °F (36.4 °C), Max:98.6 °F (37 °C)      Intake and Output:  1901 -  0700  In: 500 [P.O.:500]  Out: 720 [Urine:625; Drains:95]   0701 -  1900  In: 240 [P.O.:240]  Out: -     Physical Exam:   General appearance: alert, cooperative, no distress, appears stated age  Abdomen: soft, appropriately tender  Extremities: no edmea    Incision: dry and no drainage    Data Review:    No results found for this or any previous visit (from the past 24 hour(s)).     Assessment/Plan:     Active Problems:    Renal mass, left (2020)        Status Post:  Procedure(s):  ROBOTIC LEFT RETROPERITONEAL PARTIAL NEPHRECTOMY     Plan:    DC home  Rx sent  Has stool softener   Follow up with Dr. Tamika Aguilar as scheduled    Signed By: Toshia Stoeks NP                         2020

## 2020-08-27 NOTE — DISCHARGE INSTRUCTIONS
Dr. Aubrie Trivedi    ? Dr. Yisel Phillips or his associate will see you back in the office in 1-2 weeks. ? At that time your final pathology report will be reviewed with you. DISCHARGE MEDICATIONS    ? Upon discharge you will be given a prescription for pain control (Tramadol)  ? Most patients experience only minimal discomfort after this minimally invasive surgery. We recommend using Tylenol (acetaminophen) for pain first and reserve the narcotic pain medication as an alternative as it tends to cause constipation. ? You may resume your normal medications upon discharge from the hospital with the exception of any blood-thinning products (such as coumadin, plavix, Xeralto, aspirin, etc). You may resume blood-thinning medications in 1 week unless otherwise instructed by the prescribing physician. ACTIVITY    ? Please refrain from driving for the 1st week after your surgery. ? You may shower upon discharge from the hospital. Avoid bathtubs, hot tubs or swimming pools during the 1st 2 weeks. ? It is important to be mobile during your recovery period. Avoid sitting in one position for longer than 45 minutes to 1 hour. Walking and climbing stairs are OK. Be careful not to overdo it. ? Avoid any heavy lifting or strenuous activity for 4-6 weeks. ? Most patients ease into normal activities (including employment) after 2 weeks of recuperation. ? Most patients resume driving by the 2nd week. Please use your best judgment. CARE OF YOUR INCISION SITES    ? Application of ointments or creams to the incision sites is not recommended. ? The adhesive clear wound dressing will slough off naturally in 5 - 10 days. ? Do not pick at or apply ointments/medications to the adhesive dressing. ? Do not scrub, soak or expose incisions to prolonged moisture. DIET    ?  Please limit carbonated beverages, dairy products and any other gas producing foods (such as flour, beans, or broccoli) for the 1st week or so. Small meals are best until bowel habits return to normal.        THINGS YOU MAY ENCOUNTER AFTER SURGERY    ? Bruising around incision sites. Not at all uncommon and should not alarm you. This will resolve with time. ? Abdominal distention, constipation or bloating. Make sure you are following the dietary instructions. If you dont have a bowel movement or pass gas or are feeling uncomfortable 24 hours after surgery, try taking Milk of Magnesia as directed on the bottle. If after two doses of Milk of Magnesia, you still have not had a bowel movement, it is safe to use a Dulcolax suppository (available over the counter at your local pharmacy). ? Males may have Scrotal/Penile swelling and bruising. This is quite common and should not alarm you. It may appear immediately after surgery or may start 4 -5 days after surgery. It should resolve in about 7 - 14 days. You may try elevating your scrotum with a small towel or washcloth when lying down. ? Lower legs/ankle swelling. This is not abnormal when it occurs in both legs and should not alarm you. It should resolve in about 7 - 14 days. Elevation of your legs while sitting will help. CONTACT MD IMMEDIATELY IF YOU ARE EXPERIENCING ANY OF THE FOLLOWING SYMPTOMS:    ? Oral Temperature over 101° F.  ? Unable to urinate. ? Any pain not relieved by pain medication prescribed. ? Persistent nausea/vomiting. ? Uneven swelling of legs or ankles. ? Redness and/or large amount of swelling around your incision sites. ? Excessive bleeding or drainage from your incision sites.         [address]  [phone]

## 2020-08-28 ENCOUNTER — PATIENT OUTREACH (OUTPATIENT)
Dept: OTHER | Age: 65
End: 2020-08-28

## 2020-08-28 NOTE — ACP (ADVANCE CARE PLANNING)
Does patient have an Advance Directive:  decision makers updated   AC Verified snow care proxy and contact information.  Patient declines further information on ACP.       AC encourages patient to review with PCP

## 2020-08-28 NOTE — PROGRESS NOTES
Received transfer of CM episode CCM/ JOANA noted on 2020    Patient with inpt stay at [facility] - for Robotic Left partial nephrectomy for renal mass. L CURTIS - at [facility]. RUR score 8%- nephrectomy   Was patient contacted within 2 business days of discharge? Yes. Care Transition Nurse (CTN) contacted the patient by telephone to perform post hospital discharge assessment. Verified name and  with patient as identifiers. Provided introduction to self, and explanation of the CTN role. Challenges to be reviewed by the provider   Additional needs identified to be addressed with provider no  none    Discussed COVID-19 related testing which was Negative . Test results were negative. Patient informed of results, if available? yes        Method of communication with provider : none    Advance Care Planning:   Does patient have an Advance Directive:  decision makers updated   [facility] Verified snow care proxy and contact information.  Patient declines further information on ACP.  [facility] encourages patient to review with PCP      Inpatient Readmission Risk score: 8  Was this a readmission? No - CURTIS separate issue/  2020  Patient stated reason for the admission:  Cyst on my kidney    Patients top risk factors for readmission: medication management, multiple health system providers and 2 surgeries within 60 days. Medication management/ DVT risks/ orthostatic hypotention risk. Interventions to address risk factors: Obtained and reviewed discharge summary and/or continuity of care documents, Reviewed and followed up on pending diagnostic tests and treatments-Pathology will be reviewed at [facility] ([facility] FU visit , Education of patient/family/caregiver/guardian to support self-management-Orthostatic hypotention risks/ infection risks/ DVT-PE risks and Assessment and support for treatment adherence and medication management-Ensured she stopped indocin/gout;  warned about lower BP- she has cuff at home. Recommended ways to avoid infection including:    - separate washcloths for surgical area and rest of body/ change frequently. * Avoid spreading of bacteria/ infecting incisions. - change bed sheets pre-op and every other day when home until suture lines are closed. More frequently if sweating or wound discharge is present. - Avoid pets in the bed until suture lines closed. * Avoid infection/ inflammation.      - Hydrate well post-op - and start Colace/ Mirralax to prevent post op constipation. Avoid heavy meal pre-op (stay with easily digested foods/ high in fiber). IV hydration helps your muscles but not your gut. Discussed risks for Orthostatic Hypotension:  Syncope Prevention:    Education:    The heart is a pump that distributes blood via arteries with oxygen and nutrients to deliver to cells/ but return from the body with used blood cells does not have a pump.  Muscle contractions help the body to return blood aided by small valves in the veins that keep the blood moving forward.   If no muscle movement, blood can pool in the extremities and not return to the heart. This is more of a problem if patients have lower blood pressure for reasons such as:  dehydration; heat exposure; anemia; vein insufficiency (problem with those little valves); medications; and other health problems.  When you change positions quickly your blood pressure can drop - and your blood flow lowers.  Your brain is a big user of both oxygen and nutrients carried by the blood, so without a supply, your brain shuts down - like a computer that goes to a locked screen when you dont use it. Thats when you feel weak/ dizzy/ faint/ or pass out.  Another cause is the vaso-vagal response. This is when you faint because your body overreacts to certain triggers, such as the sight of blood or extreme emotional distress (see blood or needles). This can also happen when you strain for a bowel movement. CM encourages:   hydration; slow position changes; support hose/ LE flexing before standing; stand by in BR; Avoid hot showers. * CM notes lower BP - cautions for orthostatic precautions;     - Slow position changes/ lying to sitting to standing. Allow for blood flow changes.    - Education on heel pumps and tip toe to stimulate calf muscles and blood flow return to prevent syncope. - Watch hydration/ no swelling or pain in Lower extremities. - Encourages stand by assist to bathroom; warned about straining - vasovagal response. - Encouraged to sign up and wear medical alert at all times.  If you feel faint/ sit down quickly,  put your head between your legs. Or if possible lie down flat or elevate your legs above your heart to encourage blood to return to brain and heart. Hydrate and take deep breaths for 3-5 minutes. Then slow changes to monitor for any return of symptoms. CTN reviewed discharge instructions, medical action plan and red flags with patient who verbalized understanding. Were discharge instructions available to patient? yes. Reviewed appropriate site of care based on symptoms and resources available to patient including: PCP, Specialist, Urgent [facility] 24/7 and When to call 911. Patient given an opportunity to ask questions and does not have any further questions or concerns at this time. The patient agrees to contact the PCP office for questions related to their healthcare. Medication reconciliation was performed with patient, who verbalizes understanding of administration of home medications. Advised obtaining a 90-day supply of all daily and as-needed medications.    Referral to Pharm D needed: no     Home Health/Outpatient orders at discharge: none    Durable Medical Equipment ordered at discharge: None and Has equipment at home/  shower has build in seating    Covid Risk Education    Patient has following risk factors of: Debilitation/ HTN. Education provided regarding infection prevention, and signs and symptoms of COVID-19 and when to seek medical attention with patient who verbalized understanding. Discussed exposure protocols and quarantine From CDC: Are you at higher risk for severe illness?  and given an opportunity for questions and concerns. The patient agrees to contact the COVID-19 hotline [phone] or PCP office for questions related to COVID-19. For more information on steps you can take to protect yourself, see CDC's How to Protect Yourself     Patient/family/caregiver given information for GetWell Loop and agrees to enroll no  Patient's preferred e-mail: declines  Patient's preferred phone number: declines    Discussed follow-up appointments. If no appointment was previously scheduled, appointment scheduling offered: Apts in place. Is follow up appointment scheduled within 7 days of discharge? yes   [address] follow up appointment(s): No future appointments. Non-[facility] follow up appointment(s): URO 9/3;  PCP 9/2    Plan for follow-up call in 5-7 days based on severity of symptoms and risk factors. Plan for next call: follow up appointment-Pathology results? BP check/ ensure recovery on track  CTN provided contact information for future needs. Goals Addressed                 This Visit's Progress     Demonstrate behaviors that enable safe resumption of ADLs without falls; On track     8/21: Has stopped PT as of yesterday due to COVID precautions for next procedure for 8/25.    8/28 - Patient with inpt stay at [facility] 8/25-8/27 for Robotic Left partial nephrectomy for renal mass. L CURTIS 7/8-7/9 at [address] [place] [address]. Recovering at home.  Demonstrates no signs of infection postop   On track     8/21: no infection     Inpt stay at [facility] 8/25-8/27 for Robotic Left partial nephrectomy for renal mass.   L CURTIS 7/8-7/9 at [address] [place] [address].    8/28 - reviewed Red Flags/ Recommended: - separate washcloths for surgical area and rest of body/ change frequently. * Avoid spreading of bacteria/ infecting incisions. - change bed sheets pre-op and every other day when home until suture lines are closed. More frequently if sweating or wound discharge is present. - Avoid pets in the bed until suture lines closed. * Avoid infection/ inflammation.      - Hydrate well pre-op - and start Colace/ Mirralax to prevent post op constipation. Avoid heavy meal pre-op (stay with easily digested foods/ high in fiber). IV hydration helps your muscles but not your gut.  Syncope Prevention/ Orthostatic hypotention   On track     Patient with inpt stay at [facility] 8/25-8/27 for Robotic Left partial nephrectomy for renal mass. L CURTIS 7/8-7/9 at [facility]. Syncope Prevention:    Education:    The heart is a pump that distributes blood via arteries with oxygen and nutrients to deliver to cells/ but return from the body with used blood cells does not have a pump.  Muscle contractions help the body to return blood aided by small valves in the veins that keep the blood moving forward.   If no muscle movement, blood can pool in the extremities and not return to the heart. This is more of a problem if patients have lower blood pressure for reasons such as:  dehydration; heat exposure; anemia; vein insufficiency (problem with those little valves); medications; and other health problems.  When you change positions quickly your blood pressure can drop - and your blood flow lowers.  Your brain is a big user of both oxygen and nutrients carried by the blood, so without a supply, your brain shuts down - like a computer that goes to a locked screen when you don't use it. That's when you feel weak/ dizzy/ faint/ or pass out.  Another cause is the vaso-vagal response. This is when you faint because your body overreacts to certain triggers, such as the sight of blood or extreme emotional distress (see blood or needles).   This can also happen when you strain for a bowel movement. CM encourages:   hydration; slow position changes; support hose/ LE flexing before standing; stand by in BR; Avoid hot showers. * CM notes lower BP - cautions for orthostatic precautions;     - Slow position changes/ lying to sitting to standing. Allow for blood flow changes.    - Education on heel pumps and tip toe to stimulate calf muscles and blood flow return to prevent syncope. - Watch hydration/ no swelling or pain in Lower extremities. - Encourages stand by assist to bathroom; warned about straining - vasovagal response. - Encouraged to sign up and wear medical alert at all times.  If you feel faint/ sit down quickly,  put your head between your legs. Or if possible lie down flat or elevate your legs above your heart to encourage blood to return to brain and heart. Hydrate and take deep breaths for 3-5 minutes. Then slow changes to monitor for any return of symptoms.  Verbalize pain control with prescribed medications to level of 5/10   On track     8/21: Pain is fine. No pain. 8/28 -  Patient with inpt stay at [facility] 8/25-8/27 for Robotic Left partial nephrectomy for renal mass. L CURTIS 7/8-7/9 at [facility].   /  Dois Lester op pain as expected- taking Tramadol. JOANA FU: One week. Chart Review:    Preop:    HPI:   Lily Carrillo is a 62 y.o. female referred for pre-operative evaluation by Dr. Yisel Phillips for surgery on 8/25/20. Ms. Nava Swain has a history of a renal \"cyst\" that she states over the last 90 days has gotten bigger. She underwent a [address] on 7/8/20 and her ortho surgeon and Dr. Yisel Phillips communicated for the best time to have this surgery. She is going to be 6 weeks postop on surgery day. She notes the cyst was originally found incidentally and she denies pain or urinary difficulty.      She is currently still taking ASA from her hip surgery. Typically this is only a 30 days course, will reach out to ortho surgeon and ask if it is ok to stop.    The patient was evaluated in the surgeon's office and it was determined that the most appropriate plan of care is to proceed with surgical intervention. Patient's PCP Yuri Meadows MD    OR rept:  Date of Procedure: 8/25/2020       Diagnosis: RENAL MASS   Procedure(s):  ROBOTIC LEFT RETROPERITONEAL PARTIAL NEPHRECTOMY POSSIBLE RADICAL NEPHRECTOMY    TUBES AND DRAINS:  1.  A 16-French Villarreal catheter. 2.  A 15-French Noah-Rome drain. Surgeon(s):  MD Angelina Cox MD    Visit Vitals  /62 (BP 1 Location: Right arm, BP Patient Position: Sitting;Post activity)   Pulse (!) 102   Temp 98.6 °F (37 °C)   Resp 19   Ht 4' 11\" (1.499 m)   Wt 59.9 kg (132 lb)   SpO2 99%   BMI 26.66 kg/m²      Intake and Output:  08/25 1901 - 08/27 0700  In: 500 [P.O.:500]  Out: 720 [Urine:625; Drains:95]  08/27 0701 - 08/27 1900  In: 240 [P.O.:240]  Out: -     Plan:    DC home  Rx sent  Has stool softener   Follow up with Dr. John Arce as scheduled     START taking these medications     Details   traMADoL (ULTRAM) 50 mg tablet Take 1 Tab by mouth every six (6) hours as needed for Pain for up to 3 days. Max Daily Amount: 200 mg. Qty: 12 Tab, Refills: 0     Associated Diagnoses: Renal mass, left         STOP taking these medications         indomethacin (INDOCIN) 25 mg capsule Comments:   Reason for Stopping:                 Follow up with [address] [place] [address]. on 9/2/2020; Hospital follow up PCP appointment Wednesday, 9/2/20 @ 10:30 a.m. with Dr. Celeste Loyd. CM note 8/26  I met with the patient while wearing a mask. Patient states she lives alone in a 1 story home with 6 steps to enter. [facility] health prior with [facility]. DME-Has RW, uses cane. Patient is independent on all ADL's and drives. Scripts @ "[employer]" on [address].     Height  1.499 m  Weight  59.9 kg  BMI  26.66 kg/m²  OB Status  Postmenopausal  Smoking Status  Current Every Day  Smoker  Emergency Contacts  Name Discharge Info Relation Home Work Mobile  Henry  CAREGIVER [3]  Son [22] [phone]  [address] [facility]  CAREGIVER [3]  Other Relative [6] [phone]    Saranya Lynn or his associate will see you back in the office in 1-2 weeks. At that time your final pathology report will be reviewed with you. ACTIVITY  Please refrain from driving for the 1st week after your surgery. You may shower upon discharge from the hospital. Avoid bathtubs, hot tubs or swimming pools during the 1st 2  weeks. It is important to be mobile during your recovery period. Avoid sitting in one position for longer than 45 minutes to  1 hour. Walking and climbing stairs are OK. Be careful not to overdo it. Avoid any heavy lifting or strenuous activity for 4-6 weeks. Most patients ease into normal activities (including employment) after 2 weeks of recuperation. Most patients resume driving by the 2nd week. Please use your best judgment. CARE OF YOUR INCISION SITES  Application of ointments or creams to the incision sites is not recommended. The adhesive clear wound dressing will slough off naturally in 5 - 10 days. Do not pick at or apply ointments/medications to the adhesive dressing. Do not scrub, soak or expose incisions to prolonged moisture. DIET  Please limit carbonated beverages, dairy products and any other gas producing foods (such as flour, beans, or  broccoli) for the 1st week or so. Small meals are best until bowel habits return to normal.  THINGS YOU MAY ENCOUNTER AFTER SURGERY  Bruising around incision sites. Not at all uncommon and should not alarm you. This will resolve with time. Abdominal distention, constipation or bloating. Make sure you are following the dietary instructions. If you  dont have a bowel movement or pass gas or are feeling uncomfortable 24 hours after surgery, try taking Milk of  Magnesia as directed on the bottle.  If after two doses of Milk of Magnesia, you still have not had a bowel  movement, it is safe to use a Dulcolax suppository (available over the counter at your local pharmacy). Males may have Scrotal/Penile swelling and bruising. This is quite common and should not alarm you. It may  appear immediately after surgery or may start 4 -5 days after surgery. It should resolve in about 7 - 14 days. You  may try elevating your scrotum with a small towel or washcloth when lying down. Lower legs/ankle swelling. This is not abnormal when it occurs in both legs and should not alarm you. It should  resolve in about 7 - 14 days. Elevation of your legs while sitting will help.   CONTACT MD IMMEDIATELY IF YOU ARE EXPERIENCING ANY OF THE FOLLOWING SYMPTOMS:  Oral Temperature over 101° F. Unable to urinate. Any pain not relieved by pain medication prescribed. Persistent nausea/vomiting. Uneven swelling of legs or ankles. Redness and/or large amount of swelling around your incision sites. Excessive bleeding or drainage from your incision sites.   [address]  [phone]

## 2020-09-03 ENCOUNTER — PATIENT OUTREACH (OUTPATIENT)
Dept: OTHER | Age: 65
End: 2020-09-03

## 2020-09-03 RX ORDER — IRBESARTAN 300 MG/1
TABLET ORAL
COMMUNITY
Start: 2020-08-27 | End: 2021-07-01 | Stop reason: SDUPTHER

## 2020-09-03 NOTE — PROGRESS NOTES
CM  follow up call. Patient with inpt stay at [facility] - for Robotic Left partial nephrectomy for renal mass. L CURTIS - at [facility]. RUR score 8%- nephrectomy    Chart review:  No new documentation within CC. Contacted  patient for CM follow up services. Verified  and address for HIPAA security. * Feels overall better. - Less surgical pain from nephrectomy site/ hip pain from replacement is more of the issue. - Now using Tramadol. * Cancer -  But good news- clean margins on path report so no treatments/ no onc referral.     - CM shares that this is \"The best bad news she could hope for. \"   Early ID of cancer!    - Close monitoring with scans planned. * Resume PT in one week for CURTIS. - Sore but  / she is trying to walk everyday. - CM reminds her that she has HEP handouts at home from PT prior to renal surgery. * No fever/ no s/sx of infection. * CM asks how her mood is with all her health issues and COVID isolation.   - Normalized that many people need anti-depressants when faced with as many issues.     - Ms. Laury Claire states, \"I'll just deal with it. \"     - CM offers support if needed. * MED CHANGES:  Now using Tramadol for pain/ mostly at night when pain is worse.     -Cozaar discontinued and Avapro started for less kidney stress. * MD APTS:  20 Urology - cancelled PCP visit same date. * Patient Concerns:  FMLA- phone message out to her HR assigned worker. * CM Concerns: At risk for depression. Advanced Directive:  Patient offered information on development of Advanced Care Planning. * Patient defers this topic to another time. / Patient has AMP and is requested to bring to hospital or [facility] office for scanning. * Referred to ACP Project Educator - Deanna Inman  [phone] for further assistance. * Resources sent to patient:  5 steps to ACP; Choosing a Health Proxy; Starter Kit.     * Rodolfo harley [employer] handout. Will follow for CM services.    Next planned outreach:

## 2020-09-29 ENCOUNTER — PATIENT OUTREACH (OUTPATIENT)
Dept: OTHER | Age: 65
End: 2020-09-29

## 2020-09-29 NOTE — PROGRESS NOTES
JOANA  Wrap Up  /  CCM resuming    Covering for Jose Baez LPN    Patient with inpt stay at [facility] - for Robotic Left partial nephrectomy for renal mass. L CURTIS - at [facility]. Obs stay for leg pain 2020. RUR score 8%- nephrectomy    8:15am  Called patient for Care Management / JOANA FU. Left discreet voice mail with my contact information encourage a call back. Resolving current episode (Transitions of care complete). No further ED/UC or hospital admissions within 30 days post discharge. Patient attended follow-up appointments as directed. 1:15pm   Incoming call from patient for CM   Verified  and address for HIPAA security. * Ms. Elsy Jose reports that her surgical hip is causing more and more pain for her. * She is going to ortho today for eval and x-rays. * Healing from nephrectomy site is doing well. * She is anxious about her hip. \"I think having those surgeries too close together really did me in.\"      * I invited patient to call me back after her visit for an update - she declines. Anticipates she will be in pain and will need to rest once she is home. \"A call next week will be better. \"    * CM informed patient that I would be out of the office 10/2-10/12. Halley Nelson RN  [phone] if needs arise in my absence.       FU for post surgical hip pain

## 2020-10-07 ENCOUNTER — PATIENT OUTREACH (OUTPATIENT)
Dept: OTHER | Age: 65
End: 2020-10-07

## 2020-10-07 NOTE — PROGRESS NOTES
At [address] covering for Duncan Mcpherson RN, [facility] team; Outreach call to patient in follow up of CM services, erified  and Zip code. Introduced self a covering for Duncan Mcpherson RN, following up on her hip pain and previous renal surgery;  States she is in the doctor's office right now and can't talk. Asked for alva back later today;  Agreed; At [employer] attempt with this patient, verified  and Zip code as identifiers; However she states she is just really busy and has a lot going on. Still not a good time or day.   Ensured she has this CM number and name in case a need arises while Josh Lee is out of the office;

## 2020-10-27 ENCOUNTER — PATIENT OUTREACH (OUTPATIENT)
Dept: OTHER | Age: 65
End: 2020-10-27

## 2020-10-27 NOTE — Clinical Note
Azael Soto-  I've been covering for you with Ms. Ethan Braxton- you can review my notes- if any questions I am happy to review her updates. I think she has turned a corner and is steadily improving now.

## 2020-10-27 NOTE — PROGRESS NOTES
Covering for Mauricio Cummins LPN  [facility]  follow up call. Patient with inpt stay at [facility] - for Robotic Left partial nephrectomy for renal mass.  L CURTIS - at [facility].   Obs stay for leg pain 2020.      4:30pm Contacted  patient for CM follow up services. Verified  and address for HIPAA security. * PT twice a week. - Muscles are tight, but not as painful    * Going back to work [employer]. - Working remote from home. - CM encourages position changes/ working at [employer] counter/ standing- avoid sitting for long periods. - Do PT's HEP on breaks- encouraged her not to back slide.      - Mentioned intermittent FMLA if needed. She states MD talked about the same until PT is completed. * FU with nephrology went well. - Path did show renal carcinoma, but encapsulated with broad margins.     - Close FU, q 6 months but no Rad Onc or Chemo. * Family surprised her for her birthday.    - Alvin Cummings and presents she was not expecting.     - Lifted her spirits. * CM reminds patient that open enrollment is in process. - She plans to enroll when back to work next week. * MED CHANGES:  Script for Tramadol if needed but she has not needed it. CM shares that initial ACM is back to work/ to expect next call from Ms. Mauricio Cummins. - She will keep my number and call if needs arise before that time.

## 2020-11-11 ENCOUNTER — PATIENT OUTREACH (OUTPATIENT)
Dept: OTHER | Age: 65
End: 2020-11-11

## 2020-11-11 NOTE — PROGRESS NOTES
[facility] Follow up  Care Manager contacted the patient by telephone in follow up. Verified  and zip code with patient as identifiers. Patient with inpt stay at [facility] - for Robotic Left partial nephrectomy for renal mass.  L [facility] - at [facility].    Obs stay for leg pain 2020    1:00pm Spoke with patient  · Completed PT and returned to work 11/3/20  · States having increased muscle/tendo tightness since return, possibly due to prolonged sitting  · Plans to contact orthopedic surgeon and request additional PT  · Encouraged standing,walking and frequent position change as well chair change  · Perform HEP as directed  · Advised Intermittent FMLA to allow for continued PT,   · Patient plans to call to start if needed    No complaints regarding renal surgery  · Path did show renal carcinoma, but encapsulated with broad margins. No Chemo or XRT  · Has follow in planned in January with repeat CT scan    No other concerns or questions. Patient has completed Open Enrollment for       Assessment of clinical changes and knowledge demonstrated since last call:   Ongoing Plan of Care:   GOAL: Demonstrate behaviors that enable safe resumption of ADLs without falls;  · 20 Completed PT, back to work  · 20 May need additional PT due to increased pain, patient to call MD    GOAL:Verbalize pain control with prescribed medications to level of 5/10  · 20 Reports increased pain with return to work rates 54-5/10  · Advised frequent position change, walking, attention to chair etc  · Advised to continue HEP and call MD for additional PT     Review and discussion of plan of care with patient, who has provided input to plan, verbalized understanding and agrees with current goals.       Any recurrence Red Flags or continued symptoms:increased pain with return to work     Medication Regimen Change:none  Completed a review of medications with patient, who verbalized understanding of how and when to take medications. Barriers / Adherence with medications:none    Upcoming Appointments:  Nephrologist and CT scan 1/2021  Patient asked questions appropriately and denied any additional needs at this time. Patient verbalized understanding of all information discussed. Patient has my name and contact information for any follow up needs or questions.      Plan next call:  1 to 2 weeks

## 2020-11-23 ENCOUNTER — PATIENT OUTREACH (OUTPATIENT)
Dept: OTHER | Age: 65
End: 2020-11-23

## 2020-11-23 ENCOUNTER — TRANSCRIBE ORDER (OUTPATIENT)
Dept: SCHEDULING | Age: 65
End: 2020-11-23

## 2020-11-23 DIAGNOSIS — Z12.31 VISIT FOR SCREENING MAMMOGRAM: Primary | ICD-10-CM

## 2020-11-23 NOTE — PROGRESS NOTES
Care Manager contacted the patient by telephone in follow up. Verified  and zip code with patient as identifiers. Assessment of clinical changes and knowledge demonstrated since last call:  Spoke with patient who reports she is doing much better. Started HEP and has not needed to start Out Patient Therapy at this time. States pain is minimal and able to work without difficulty. No problems or complaints from renal surgery. No new concerns or questions. Ongoing Plan of Care:   Ongoing Plan of Care:   GOAL: Demonstrate behaviors that enable safe resumption of ADLs without falls;  · 20 Patient reports doing much better, ambulating well. Doing HEP  · 20 Completed PT, back to work  · 20 May need additional PT due to increased pain, patient to call MD     GOAL:Verbalize pain control with prescribed medications to level of 5/10  · 20 Reports pain has minimal pain, much improved from last call. · 20 Reports increased pain with return to work rates 54-5/10  · Advised frequent position change, walking, attention to chair etc  · Advised to continue HEP and call MD for additional PT       Review and discussion of plan of care with patient, who has provided input to plan, verbalized understanding and agrees with current goals. Any recurrence Red Flags or continued symptoms:none     Medication Regimen Change:none  Completed a review of medications with patient, who verbalized understanding of how and when to take medications. Barriers / Adherence with medications:none    Upcoming Appointments:  Has all follow up scheduled    Patient asked questions appropriately and denied any additional needs at this time. Patient verbalized understanding of all information discussed. Patient has my name and contact information for any follow up needs or questions.      Plan next call:   About 2 weeks

## 2020-12-08 ENCOUNTER — PATIENT OUTREACH (OUTPATIENT)
Dept: OTHER | Age: 65
End: 2020-12-08

## 2020-12-08 NOTE — PROGRESS NOTES
Resolving current episode of case management. Patient has met patient stated and/or medical goals. Patient consistenly demonstrates understanding of the medical action plan through execution of plan. Appointments with key providers are scheduled and attended. Plan of care is modified and updated to address new challenges and barriers with minimal support from the CM team(proactively uses physicians and community resources) The support system remains current and has been modified as needed. Patient continues to acquire needed resources from the current support system established. Teach back demonstrates that patient understands education for self management of chronic conditions. Patient consistenly communicates understanding of signs,symptoms and complications for all major diagnoses. Patient modifies his/her lifestyle toreduce or avoid risk factors to his/her health. CC spoke with patient who reports she is doing well. Denies pain or other complications. Reports she is able to work and manage all activities without problems. Has follow up scheduled in January 2021. No new questions or concerns. ECM will follow as needed and patient has ECM contact information for future needs.

## 2021-01-08 ENCOUNTER — HOSPITAL ENCOUNTER (OUTPATIENT)
Dept: MAMMOGRAPHY | Age: 66
Discharge: HOME | End: 2021-01-08
Attending: FAMILY MEDICINE
Payer: COMMERCIAL

## 2021-01-08 DIAGNOSIS — Z12.31 VISIT FOR SCREENING MAMMOGRAM: ICD-10-CM

## 2021-01-08 PROCEDURE — 77063 BREAST TOMOSYNTHESIS BI: CPT

## 2021-04-01 ENCOUNTER — OFFICE VISIT (OUTPATIENT)
Dept: PRIMARY CARE CLINIC | Age: 66
End: 2021-04-01
Payer: COMMERCIAL

## 2021-04-01 VITALS
HEART RATE: 86 BPM | BODY MASS INDEX: 26.61 KG/M2 | RESPIRATION RATE: 18 BRPM | DIASTOLIC BLOOD PRESSURE: 69 MMHG | OXYGEN SATURATION: 98 % | TEMPERATURE: 97.3 F | SYSTOLIC BLOOD PRESSURE: 110 MMHG | HEIGHT: 59 IN | WEIGHT: 132 LBS

## 2021-04-01 DIAGNOSIS — N28.89 RENAL MASS, LEFT: ICD-10-CM

## 2021-04-01 DIAGNOSIS — F17.200 CURRENT EVERY DAY SMOKER: ICD-10-CM

## 2021-04-01 DIAGNOSIS — E78.00 HYPERCHOLESTEROLEMIA: ICD-10-CM

## 2021-04-01 DIAGNOSIS — M85.80 OSTEOPENIA, UNSPECIFIED LOCATION: ICD-10-CM

## 2021-04-01 DIAGNOSIS — I10 ESSENTIAL HYPERTENSION, BENIGN: Primary | ICD-10-CM

## 2021-04-01 PROCEDURE — 99203 OFFICE O/P NEW LOW 30 MIN: CPT | Performed by: FAMILY MEDICINE

## 2021-04-01 NOTE — PROGRESS NOTES
Buddy Merritt is a 60 y.o. female  HIPAA verified by two patient identifiers. Health Maintenance Due   Topic    Hepatitis C Screening     Pneumococcal 0-64 years (1 of 1 - PPSV23)    DTaP/Tdap/Td series (1 - Tdap)    Shingrix Vaccine Age 49> (1 of 2)    Lipid Screen     PAP AKA CERVICAL CYTOLOGY      Chief Complaint   Patient presents with   [facility] Patient     Visit Vitals  /69   Pulse 86   Temp 97.3 °F (36.3 °C) (Temporal)   Resp 18   Ht 4' 11\" (1.499 m)   Wt 132 lb (59.9 kg)   SpO2 98%   BMI 26.66 kg/m²       Pain Scale: 0 - No pain/10  Pain Location:   1. Have you been to the ER, urgent care clinic since your last visit? Hospitalized since your last visit? No    2. Have you seen or consulted any other health care providers outside of the [address] [place] [address] since your last visit? Include any pap smears or colon screening.  No

## 2021-04-01 NOTE — PROGRESS NOTES
Subjective:     Chief Complaint   Patient presents with    New Patient        She  is a 60y.o. year old female with hx of HTN, HLD, smoker, osteopenia is here as a new patient to establish. HTN: She takes Irbesartan 300 mg and HCTZ 12.5 mg daily. BP well controlled. No side effects. HLD: Takes Lipitor 40 mg daily. No side effects. Well controlled. Non seasonal allergy: well controlled with Singulair. Gout: patient reports that she has episodes of gout attack in her big toes sometimes when she eats seafood. Take Indomethacin as needed. Left renal mass: On 8/25/2020 she had ROBOTIC LEFT RETROPERITONEAL PARTIAL NEPHRECTOMY . Reports that biopsy was positive for cancer. She has been regularly following up with urologist.     Osteopenia: She was on Fosamax treatment in the past. Recent DEXA in 2018 was normal. She is not taking any Fosamax currently. Smoker: Smokes about 1/2 PPD for many years. No plan to quit smoking at this point but she will try to quit. She denies any chest pain, soa, cough, abdominal pain, N/V. Lab Results   Component Value Date/Time    Cholesterol, total 127 07/28/2020 10:00 AM    HDL Cholesterol 40 07/28/2020 10:00 AM    LDL, calculated 37.6 07/28/2020 10:00 AM    VLDL, calculated 54 (H) 12/19/2013 09:49 AM    Triglyceride 247 (H) 07/28/2020 10:00 AM         A comprehensive review of systems was negative except for that written in the HPI.   Objective:     Vitals:    04/01/21 1142   BP: 110/69   Pulse: 86   Resp: 18   Temp: 97.3 °F (36.3 °C)   TempSrc: Temporal   SpO2: 98%   Weight: 132 lb (59.9 kg)   Height: 4' 11\" (1.499 m)       Physical Examination: General appearance - alert, well appearing, and in no distress, oriented to person, place, and time and overweight  Mental status - alert, oriented to person, place, and time, normal mood, behavior, speech, dress, motor activity, and thought processes  Ears - bilateral TM's and external ear canals normal  Nose - normal and patent, no erythema, discharge or polyps  Mouth - mucous membranes moist, pharynx normal without lesions  Neck - supple, no significant adenopathy, thyroid exam: thyroid is normal in size without nodules or tenderness  Chest - clear to auscultation, no wheezes, rales or rhonchi, symmetric air entry  Heart - normal rate, regular rhythm, normal S1, S2, no murmurs, rubs, clicks or gallops  Abdomen - soft, nontender, nondistended, no masses or organomegaly  Neurological - alert, oriented, normal speech, no focal findings or movement disorder noted  Musculoskeletal - no joint tenderness, deformity or swelling  Extremities - no pedal edema noted    No Known Allergies   Social History     Socioeconomic History    Marital status:      Spouse name: Not on file    Number of children: Not on file    Years of education: Not on file    Highest education level: Not on file   Tobacco Use    Smoking status: Current Every Day Smoker     Packs/day: 0.50     Years: 37.00     Pack years: 18.50     Types: Cigarettes    Smokeless tobacco: Never Used   Substance and Sexual Activity    Alcohol use: Yes     Comment: SOCIALLY    Drug use: No    Sexual activity: Yes     Partners: Male      Family History   Problem Relation Age of Onset    Breast Cancer Maternal Aunt     Dementia Mother     Hypertension Mother     High Cholesterol Mother     Diabetes Mother     Cancer Father         COLON    Hypertension Sister     Diabetes Sister     Hypertension Brother     Heart Disease Brother     Hypertension Sister     High Cholesterol Sister     Hypertension Brother     Anesth Problems Neg Hx       Past Surgical History:   Procedure Laterality Date    COLONOSCOPY N/A 11/17/2017    COLONOSCOPY performed by Roslyn Winslow MD at [facility]. [facility] [address] [facility]  2007    due 2017    HX HIP REPLACEMENT Bilateral 2017 & 07/08/2020    HX OVARIAN CYST REMOVAL  1974    NATALIE BIOPSY BREAST STEREOTACTIC Right 2010    benign    NEUROLOGICAL PROCEDURE UNLISTED  2020    TRIGGER FINGER, CYST REMOVAL, TENDON RELEASE      Past Medical History:   Diagnosis Date    Allergic asthma     SEASONAL AND DOG ALLERGIES    Arthritis     Cyst of left kidney 01/26/2020    Hypercholesterolemia 2001    Hypertension 2001    Non-seasonal allergic rhinitis 6-2011    dr Mary Ann Sanders   [facility] Osteoporosis 2006      Current Outpatient Medications   Medication Sig Dispense Refill    irbesartan (AVAPRO) 300 mg tablet       montelukast (Singulair) 10 mg tablet Take 10 mg by mouth daily.  cholecalciferol (VITAMIN D3) 1,000 unit cap Take 1,000 Units by mouth daily.  atorvastatin (LIPITOR) 40 mg tablet Take 40 mg by mouth daily.  albuterol (PROVENTIL HFA, VENTOLIN HFA, PROAIR HFA) 90 mcg/actuation inhaler Take 2 puffs by inhalation every six (6) hours as needed for Wheezing. 1 Inhaler 0    Hydrochlorothiazide (HYDRODIURIL) 12.5 mg tablet Take 1 tablet by mouth daily. Needs to schedule ov before further refills. Indications: HYPERTENSION 30 tablet 2        Assessment/ Plan:   Diagnoses and all orders for this visit:    1. Essential hypertension, benign      BP has been well controlled with current meds. 2. Hypercholesterolemia      Well controlled with Lipitor. 3. Renal mass, left     Closely monitored by urologist. She reports that she just had a CT abdomen and chest Xray and all came back normal.  4. Osteopenia, unspecified location  -     DEXA BONE DENSITY STUDY AXIAL; Future     5. Smoker:  Counseling provided. Medication risks/benefits/costs/interactions/alternatives discussed with patient. Advised patient to call back or return to office if symptoms worsen/change/persist. If patient cannot reach us or should anything more severe/urgent arise he/she should proceed directly to the nearest emergency department. Discussed expected course/resolution/complications of diagnosis in detail with patient.   Patient given a written after visit summary which includes her diagnoses, current medications and vitals. Patient expressed understanding with the diagnosis and plan. Follow-up and Dispositions    · Return in about 1 month (around 5/1/2021), or if symptoms worsen or fail to improve, for Cholesterol, blood pressure. Jermaine Booker

## 2021-04-08 ENCOUNTER — HOSPITAL ENCOUNTER (OUTPATIENT)
Dept: MAMMOGRAPHY | Age: 66
Discharge: HOME | End: 2021-04-08
Attending: FAMILY MEDICINE
Payer: COMMERCIAL

## 2021-04-08 DIAGNOSIS — M85.80 OSTEOPENIA, UNSPECIFIED LOCATION: ICD-10-CM

## 2021-04-08 PROCEDURE — 77080 DXA BONE DENSITY AXIAL: CPT

## 2021-05-03 ENCOUNTER — OFFICE VISIT (OUTPATIENT)
Dept: PRIMARY CARE CLINIC | Age: 66
End: 2021-05-03
Payer: COMMERCIAL

## 2021-05-03 VITALS
HEART RATE: 81 BPM | HEIGHT: 59 IN | RESPIRATION RATE: 16 BRPM | WEIGHT: 133.4 LBS | BODY MASS INDEX: 26.89 KG/M2 | TEMPERATURE: 97.5 F | OXYGEN SATURATION: 99 % | DIASTOLIC BLOOD PRESSURE: 66 MMHG | SYSTOLIC BLOOD PRESSURE: 106 MMHG

## 2021-05-03 DIAGNOSIS — D22.9 ATYPICAL MOLE: ICD-10-CM

## 2021-05-03 DIAGNOSIS — I10 ESSENTIAL HYPERTENSION, BENIGN: Primary | ICD-10-CM

## 2021-05-03 DIAGNOSIS — E78.00 HYPERCHOLESTEROLEMIA: ICD-10-CM

## 2021-05-03 PROCEDURE — 99214 OFFICE O/P EST MOD 30 MIN: CPT | Performed by: FAMILY MEDICINE

## 2021-05-03 RX ORDER — INDOMETHACIN 25 MG/1
25 CAPSULE ORAL
COMMUNITY

## 2021-05-03 NOTE — PROGRESS NOTES
Identified pt with two pt identifiers(name and ). Chief Complaint   Patient presents with    Hypertension    Cholesterol Problem      Patient has orthopaedic shoes she uses for plantar fascitist - left foot       3 most recent PHQ Screens 5/3/2021   Little interest or pleasure in doing things Not at all   Feeling down, depressed, irritable, or hopeless Not at all   Total Score PHQ 2 0        Vitals:    21 1040   BP: 106/66   Pulse: 81   Resp: 16   Temp: 97.5 °F (36.4 °C)   TempSrc: Temporal   SpO2: 99%   Weight: 133 lb 6.4 oz (60.5 kg)   Height: 4' 11\" (1.499 m)   PainSc:   5   PainLoc: Foot       Health Maintenance Due   Topic    Hepatitis C Screening     Pneumococcal 0-64 years (1 of 1 - PPSV23)    DTaP/Tdap/Td series (1 - Tdap)    Shingrix Vaccine Age 50> (1 of 2)    Lipid Screen     PAP AKA CERVICAL CYTOLOGY        1. Have you been to the ER, urgent care clinic since your last visit? Hospitalized since your last visit? No    2. Have you seen or consulted any other health care providers outside of the [address] [place] [address] since your last visit? Include any pap smears or colon screening.  No

## 2021-05-03 NOTE — PROGRESS NOTES
Subjective:     Chief Complaint   Patient presents with    Hypertension    Cholesterol Problem        She  is a 60y.o. year old female with hx of HTN, HLD, smoker, osteopenia is here as a new patient to establish.       She is concern about a black spot in her medial side of the right lower leg and neck  Not sure how long the spot in her leg. It just itches. HTN: She takes Irbesartan 300 mg and HCTZ 12.5 mg daily. BP has been low in office. She denies any HA, dizziness.      HLD: Takes Lipitor 40 mg daily. No side effects. Well controlled.      Non seasonal allergy: well controlled with Singulair.      Gout: patient reports that she has episodes of gout attack in her big toes sometimes when she eats seafood. Take Indomethacin as needed.      Left renal mass: On 8/25/2020 she had ROBOTIC LEFT RETROPERITONEAL PARTIAL NEPHRECTOMY . Reports that biopsy was positive for cancer. She has been regularly following up with urologist.      Osteopenia: She was on Fosamax treatment in the past. Recent DEXA in 2018 was normal. She is not taking any Fosamax currently.     Smoker: Smokes about 1/2 PPD for many years. No plan to quit smoking at this point but she will try to quit.      She denies any chest pain, soa, cough, abdominal pain, N/V. Lab Results   Component Value Date/Time    Cholesterol, total 127 07/28/2020 10:00 AM    HDL Cholesterol 40 07/28/2020 10:00 AM    LDL, calculated 37.6 07/28/2020 10:00 AM    VLDL, calculated 54 (H) 12/19/2013 09:49 AM    Triglyceride 247 (H) 07/28/2020 10:00 AM            Pertinent items are noted in HPI.   Objective:     Vitals:    05/03/21 1040   BP: 106/66   Pulse: 81   Resp: 16   Temp: 97.5 °F (36.4 °C)   TempSrc: Temporal   SpO2: 99%   Weight: 133 lb 6.4 oz (60.5 kg)   Height: 4' 11\" (1.499 m)       Physical Examination: General appearance - alert, well appearing, and in no distress, oriented to person, place, and time and overweight  Mental status - alert, oriented to person, place, and time, normal mood, behavior, speech, dress, motor activity, and thought processes  Chest - clear to auscultation, no wheezes, rales or rhonchi, symmetric air entry  Heart - normal rate, regular rhythm, normal S1, S2, no murmurs, rubs, clicks or gallops  Extremities - no pedal edema noted. Skin: there is a <1 cm circular , slightly raised dark colored mole in medial side of the right leg.   Skin tags noted in neck    Allergies   Allergen Reactions    Pollen Extracts Itching      Social History     Socioeconomic History    Marital status:      Spouse name: Not on file    Number of children: Not on file    Years of education: Not on file    Highest education level: Not on file   Tobacco Use    Smoking status: Current Every Day Smoker     Packs/day: 0.50     Years: 37.00     Pack years: 18.50     Types: Cigarettes    Smokeless tobacco: Never Used   Substance and Sexual Activity    Alcohol use: Yes     Comment: SOCIALLY    Drug use: No    Sexual activity: Not Currently     Partners: Male      Family History   Problem Relation Age of Onset    Breast Cancer Maternal Aunt     Dementia Mother     Hypertension Mother     High Cholesterol Mother     Diabetes Mother     Cancer Father         COLON    Hypertension Sister     Diabetes Sister     Hypertension Brother     Heart Disease Brother     Hypertension Sister     High Cholesterol Sister     Hypertension Brother     Anesth Problems Neg Hx       Past Surgical History:   Procedure Laterality Date    COLONOSCOPY N/A 11/17/2017    COLON[facility] performed by Tiffany [facility]o MD at [facility]. [facility] [address] [facility]  2007    due 2017    HX HIP REPLACEMENT Bilateral 2017 & 07/08/2020    HX HIP REPLACEMENT Bilateral     HX ORTHOPAEDIC      HX OVARIAN CYST REMOVAL  1974    NATALIE BIOPSY BREAST STEREOTACTIC Right 2010    benign    NEUROLOGICAL PROCEDURE UNLISTED  2020    TRIGGER FINGER, CYST REMOVAL, TENDON RELEASE Past Medical History:   Diagnosis Date    Allergic asthma     SEASONAL AND DOG ALLERGIES    Arthritis     Cyst of left kidney 01/26/2020    Hypercholesterolemia 2001    Hypertension 2001    Non-seasonal allergic rhinitis 6-2011    dr Nestor Chowdhury   [facility] Osteoporosis 2006    Plantar fasciitis of left foot       Current Outpatient Medications   Medication Sig Dispense Refill    indomethacin (INDOCIN) 25 mg capsule Take 25 mg by mouth three (3) times daily as needed.  irbesartan (AVAPRO) 300 mg tablet       montelukast (Singulair) 10 mg tablet Take 10 mg by mouth daily.  cholecalciferol (VITAMIN D3) 1,000 unit cap Take 1,000 Units by mouth daily.  atorvastatin (LIPITOR) 40 mg tablet Take 40 mg by mouth daily.  albuterol (PROVENTIL HFA, VENTOLIN HFA, PROAIR HFA) 90 mcg/actuation inhaler Take 2 puffs by inhalation every six (6) hours as needed for Wheezing. 1 Inhaler 0    Hydrochlorothiazide (HYDRODIURIL) 12.5 mg tablet Take 1 tablet by mouth daily. Needs to schedule ov before further refills. Indications: HYPERTENSION 30 tablet 2        Assessment/ Plan:   Diagnoses and all orders for this visit:    1. Essential hypertension, benign  -     METABOLIC PANEL, COMPREHENSIVE; Future              BP is low. Advised to stop the HCTZ and continue Irbesartan. Follow up in 8 weeks. Advised to monitor BP. 2. Hypercholesterolemia  -     LIPID PANEL; Future  -     METABOLIC PANEL, COMPREHENSIVE; Future               She has been taking Lipitor 40 mg.  3. Atypical mole  -     REFERRAL TO DERMATOLOGY           Medication risks/benefits/costs/interactions/alternatives discussed with patient. Advised patient to call back or return to office if symptoms worsen/change/persist. If patient cannot reach us or should anything more severe/urgent arise he/she should proceed directly to the nearest emergency department.   Discussed expected course/resolution/complications of diagnosis in detail with patient. Patient given a written after visit summary which includes her diagnoses, current medications and vitals. Patient expressed understanding with the diagnosis and plan. Follow-up and Dispositions    · Return in about 2 months (around 7/3/2021) for Bring BP log book. Selena Mcarthur

## 2021-07-01 ENCOUNTER — OFFICE VISIT (OUTPATIENT)
Dept: PRIMARY CARE CLINIC | Age: 66
End: 2021-07-01
Payer: COMMERCIAL

## 2021-07-01 VITALS
WEIGHT: 135 LBS | TEMPERATURE: 97.6 F | OXYGEN SATURATION: 100 % | DIASTOLIC BLOOD PRESSURE: 68 MMHG | SYSTOLIC BLOOD PRESSURE: 107 MMHG | HEIGHT: 59 IN | BODY MASS INDEX: 27.21 KG/M2 | RESPIRATION RATE: 16 BRPM | HEART RATE: 82 BPM

## 2021-07-01 DIAGNOSIS — M85.80 OSTEOPENIA, UNSPECIFIED LOCATION: ICD-10-CM

## 2021-07-01 DIAGNOSIS — I10 ESSENTIAL HYPERTENSION, BENIGN: Primary | ICD-10-CM

## 2021-07-01 DIAGNOSIS — E78.00 HYPERCHOLESTEROLEMIA: ICD-10-CM

## 2021-07-01 PROCEDURE — 99213 OFFICE O/P EST LOW 20 MIN: CPT | Performed by: FAMILY MEDICINE

## 2021-07-01 RX ORDER — IRBESARTAN 300 MG/1
300 TABLET ORAL DAILY
Qty: 90 TABLET | Refills: 0 | Status: SHIPPED | OUTPATIENT
Start: 2021-07-01 | End: 2021-10-03 | Stop reason: SDUPTHER

## 2021-07-01 RX ORDER — ATORVASTATIN CALCIUM 40 MG/1
40 TABLET, FILM COATED ORAL
Qty: 90 TABLET | Refills: 1 | Status: SHIPPED | OUTPATIENT
Start: 2021-07-01 | End: 2021-10-03 | Stop reason: SDUPTHER

## 2021-07-01 RX ORDER — GLUCOSAMINE SULFATE 1500 MG
1000 POWDER IN PACKET (EA) ORAL DAILY
Qty: 90 CAPSULE | Refills: 1 | Status: SHIPPED | OUTPATIENT
Start: 2021-07-01

## 2021-07-01 NOTE — PROGRESS NOTES
Identified pt with two pt identifiers(name and ). Chief Complaint   Patient presents with    Hypertension        3 most recent PHQ Screens 2021   Little interest or pleasure in doing things Not at all   Feeling down, depressed, irritable, or hopeless Not at all   Total Score PHQ 2 0        Vitals:    21 1010   BP: 107/68   Pulse: 82   Resp: 16   Temp: 97.6 °F (36.4 °C)   TempSrc: Temporal   SpO2: 100%   Weight: 135 lb (61.2 kg)   Height: 4' 11\" (1.499 m)   PainSc:   0 - No pain       Health Maintenance Due   Topic    Hepatitis C Screening     Pneumococcal 0-64 years (1 of 2 - PPSV23)    DTaP/Tdap/Td series (1 - Tdap)    Shingrix Vaccine Age 50> (1 of 2)    PAP AKA CERVICAL CYTOLOGY        1. Have you been to the ER, urgent care clinic since your last visit? Hospitalized since your last visit? No    2. Have you seen or consulted any other health care providers outside of the [address] [place] [address] since your last visit? Include any pap smears or colon screening.  Yes - dermatology

## 2021-07-01 NOTE — PROGRESS NOTES
Subjective:     Chief Complaint   Patient presents with    Hypertension        She  is a 60y.o. year old female with hx of HTN, HLD, smoker, osteopenia is here for follow up on blood pressure.     HTN: She takes Irbesartan 300 mg and HCTZ 12.5 mg daily. In last visit I did ask her to stop HCTZ 12.5 mg for low BP. She reports that she stopped the med for 4 days . She went to [place] where she felt HA and her BP was in 140s/80s range so she restarted the med. She denies any HA, dizziness.      HLD: Takes Lipitor 40 mg daily. No side effects. Well controlled.      Non seasonal allergy: well controlled with Singulair.      Left renal mass: On 8/25/2020 she had ROBOTIC LEFT RETROPERITONEAL PARTIAL NEPHRECTOMY . Reports that biopsy was positive for cancer. She has been regularly following up with urologist.      Osteopenia: She was on Fosamax treatment in the past. Recent DEXA in 2018 was normal. She is not taking any Fosamax currently.     Smoker: Smokes about 1/2 PPD for many years. No plan to quit smoking at this point but she will try to quit.      She denies any chest pain, soa, cough, abdominal pain, N/V. Pertinent items are noted in HPI.   Objective:     Vitals:    07/01/21 1010   BP: 107/68   Pulse: 82   Resp: 16   Temp: 97.6 °F (36.4 °C)   TempSrc: Temporal   SpO2: 100%   Weight: 135 lb (61.2 kg)   Height: 4' 11\" (1.499 m)       Physical Examination: General appearance - alert, well appearing, and in no distress, oriented to person, place, and time and overweight  Mental status - alert, oriented to person, place, and time, normal mood, behavior, speech, dress, motor activity, and thought processes  Chest - clear to auscultation, no wheezes, rales or rhonchi, symmetric air entry  Heart - normal rate, regular rhythm, normal S1, S2, no murmurs, rubs, clicks or gallops    Allergies   Allergen Reactions    Pollen Extracts Itching      Social History     Socioeconomic History    Marital status:  Spouse name: Not on file    Number of children: Not on file    Years of education: Not on file    Highest education level: Not on file   Tobacco Use    Smoking status: Current Every Day Smoker     Packs/day: 0.50     Years: 37.00     Pack years: 18.50     Types: Cigarettes    Smokeless tobacco: Never Used   Vaping Use    Vaping Use: Never used   Substance and Sexual Activity    Alcohol use: Yes     Comment: SOCIALLY    Drug use: No    Sexual activity: Not Currently     Partners: Male     Social Determinants of Health     Financial Resource Strain:     Difficulty of Paying Living Expenses:    Food Insecurity:     Worried About Running Out of Food in the Last Year:     Ran Out of Food in the Last Year:    Transportation Needs:     Lack of Transportation (Medical):      Lack of Transportation (Non-Medical):    Physical Activity:     Days of Exercise per Week:     Minutes of Exercise per Session:    Stress:     Feeling of Stress :    Social Connections:     Frequency of Communication with Friends and Family:     Frequency of Social Gatherings with Friends and Family:     Attends Quaker Services:     Active Member of Clubs or Organizations:     Attends Club or Organization Meetings:     Marital Status:       Family History   Problem Relation Age of Onset    Breast Cancer Maternal Aunt     Dementia Mother     Hypertension Mother     High Cholesterol Mother     Diabetes Mother     Cancer Father         COLON    Hypertension Sister     Diabetes Sister     Hypertension Brother     Heart Disease Brother     Hypertension Sister     High Cholesterol Sister     Hypertension Brother     Anesth Problems Neg Hx       Past Surgical History:   Procedure Laterality Date    COLONOSCOPY N/A 11/17/2017    [facility] at [address] [place] [address]    due 2017    HX HIP REPLACEMENT Bilateral 2017 & 07/08/2020    HX HIP REPLACEMENT Bilateral  HX ORTHOPAEDIC      HX OVARIAN CYST REMOVAL  1974    NATALIE BIOPSY BREAST STEREOTACTIC Right 2010    benign    NEUROLOGICAL PROCEDURE UNLISTED  2020    TRIGGER FINGER, CYST REMOVAL, TENDON RELEASE      Past Medical History:   Diagnosis Date    Allergic asthma     SEASONAL AND DOG ALLERGIES    Arthritis     Cyst of left kidney 01/26/2020    Hypercholesterolemia 2001    Hypertension 2001    Non-seasonal allergic rhinitis 6-2011    dr Dominic Guerra Osteoporosis 2006    Plantar fasciitis of left foot       Current Outpatient Medications   Medication Sig Dispense Refill    indomethacin (INDOCIN) 25 mg capsule Take 25 mg by mouth three (3) times daily as needed.  irbesartan (AVAPRO) 300 mg tablet       montelukast (Singulair) 10 mg tablet Take 10 mg by mouth daily.  cholecalciferol (VITAMIN D3) 1,000 unit cap Take 1,000 Units by mouth daily.  atorvastatin (LIPITOR) 40 mg tablet Take 40 mg by mouth daily.  albuterol (PROVENTIL HFA, VENTOLIN HFA, PROAIR HFA) 90 mcg/actuation inhaler Take 2 puffs by inhalation every six (6) hours as needed for Wheezing. 1 Inhaler 0        Assessment/ Plan:   Diagnoses and all orders for this visit:    1. Essential hypertension, benign  -   continue  irbesartan (AVAPRO) 300 mg tablet; Take 1 Tablet by mouth daily. BP is low. She will try to stop HCTZ again and monitor BP. She will continue HCTZ if BP starts spiking up. 2. Hypercholesterolemia  -     atorvastatin (LIPITOR) 40 mg tablet; Take 1 Tablet by mouth nightly. 3. Osteopenia, unspecified location  -     cholecalciferol (Vitamin D3) 25 mcg (1,000 unit) cap; Take 1 Capsule by mouth daily. Medication risks/benefits/costs/interactions/alternatives discussed with patient.   Advised patient to call back or return to office if symptoms worsen/change/persist. If patient cannot reach us or should anything more severe/urgent arise he/she should proceed directly to the nearest emergency department. Discussed expected course/resolution/complications of diagnosis in detail with patient. Patient given a written after visit summary which includes her diagnoses, current medications and vitals. Patient expressed understanding with the diagnosis and plan. Follow-up and Dispositions    · Return in about 3 months (around 10/1/2021), or if symptoms worsen or fail to improve, for Bring BP log book. Edgardo Lloyd

## 2021-08-09 DIAGNOSIS — J30.89 NON-SEASONAL ALLERGIC RHINITIS, UNSPECIFIED TRIGGER: Primary | ICD-10-CM

## 2021-08-09 RX ORDER — MONTELUKAST SODIUM 10 MG/1
10 TABLET ORAL DAILY
Qty: 90 TABLET | Refills: 0 | Status: SHIPPED | OUTPATIENT
Start: 2021-08-09 | End: 2021-11-22 | Stop reason: SDUPTHER

## 2021-08-30 DIAGNOSIS — J45.20 MILD INTERMITTENT EXTRINSIC ASTHMA WITHOUT COMPLICATION: Primary | ICD-10-CM

## 2021-08-30 RX ORDER — ALBUTEROL SULFATE 90 UG/1
2 AEROSOL, METERED RESPIRATORY (INHALATION)
Qty: 1 G | Refills: 1 | Status: SHIPPED | OUTPATIENT
Start: 2021-08-30 | End: 2022-06-17 | Stop reason: SDUPTHER

## 2021-10-01 ENCOUNTER — OFFICE VISIT (OUTPATIENT)
Dept: PRIMARY CARE CLINIC | Age: 66
End: 2021-10-01
Payer: MEDICARE

## 2021-10-01 VITALS
HEART RATE: 74 BPM | OXYGEN SATURATION: 98 % | BODY MASS INDEX: 27.62 KG/M2 | RESPIRATION RATE: 18 BRPM | WEIGHT: 137 LBS | SYSTOLIC BLOOD PRESSURE: 135 MMHG | DIASTOLIC BLOOD PRESSURE: 80 MMHG | HEIGHT: 59 IN | TEMPERATURE: 97.8 F

## 2021-10-01 DIAGNOSIS — E78.00 HYPERCHOLESTEROLEMIA: ICD-10-CM

## 2021-10-01 DIAGNOSIS — J30.9 ALLERGIC RHINITIS, UNSPECIFIED SEASONALITY, UNSPECIFIED TRIGGER: ICD-10-CM

## 2021-10-01 DIAGNOSIS — I10 ESSENTIAL HYPERTENSION, BENIGN: Primary | ICD-10-CM

## 2021-10-01 PROCEDURE — 99214 OFFICE O/P EST MOD 30 MIN: CPT | Performed by: FAMILY MEDICINE

## 2021-10-01 NOTE — PROGRESS NOTES
Subjective:     Chief Complaint   Patient presents with    Follow-up     on medications        She  is a 60y.o. year old female with hx of HTN, HLD, smoker, osteopenia is here for follow up on blood pressure.     HTN: She takes Irbesartan 300 mg. BP sometimes fluctuates.      HLD: Takes Lipitor 40 mg daily. No side effects. Well controlled.      Non seasonal allergy: Well controlled with Singulair.      Left renal mass: On 8/25/2020 she had ROBOTIC LEFT RETROPERITONEAL PARTIAL NEPHRECTOMY . Reports that biopsy was positive for cancer. She has been regularly following up with urologist.      Smoker: Smokes about 1/2 PPD for many years. No plan to quit smoking at this point but she will try to quit.      She denies any chest pain, soa, cough, abdominal pain, N/V. Pertinent items are noted in HPI.   Objective:     Vitals:    10/01/21 0952 10/01/21 0959   BP: (!) 144/81 (!) 146/78   Pulse: 74    Resp: 18    Temp: 97.8 °F (36.6 °C)    TempSrc: Temporal    SpO2: 98%    Weight: 137 lb (62.1 kg)    Height: 4' 11\" (1.499 m)        Physical Examination: General appearance - alert, well appearing, and in no distress, oriented to person, place, and time and overweight  Mental status - alert, oriented to person, place, and time, normal mood, behavior, speech, dress, motor activity, and thought processes  Chest - clear to auscultation, no wheezes, rales or rhonchi, symmetric air entry  Heart - normal rate, regular rhythm, normal S1, S2, no murmurs, rubs, clicks or gallops    Allergies   Allergen Reactions    Pollen Extracts Itching      Social History     Socioeconomic History    Marital status:      Spouse name: Not on file    Number of children: Not on file    Years of education: Not on file    Highest education level: Not on file   Tobacco Use    Smoking status: Current Every Day Smoker     Packs/day: 0.50     Years: 37.00     Pack years: 18.50     Types: Cigarettes    Smokeless tobacco: Never Used   Vaping Use    Vaping Use: Never used   Substance and Sexual Activity    Alcohol use: Yes     Comment: SOCIALLY    Drug use: No    Sexual activity: Not Currently     Partners: Male     Social Determinants of Health     Financial Resource Strain:     Difficulty of Paying Living Expenses:    Food Insecurity:     Worried About Running Out of Food in the Last Year:     [address] in the Last Year:    Transportation Needs:     Lack of Transportation (Medical):      Lack of Transportation (Non-Medical):    Physical Activity:     Days of Exercise per Week:     Minutes of Exercise per Session:    Stress:     Feeling of Stress :    Social Connections:     Frequency of Communication with Friends and Family:     Frequency of Social Gatherings with Friends and Family:     Attends Mormonism Services:     Active Member of Clubs or Organizations:     Attends Club or Organization Meetings:     Marital Status:       Family History   Problem Relation Age of Onset    Breast Cancer Maternal Aunt     Dementia Mother     Hypertension Mother     High Cholesterol Mother     Diabetes Mother     Cancer Father         COLON    Hypertension Sister     Diabetes Sister     Hypertension Brother     Heart Disease Brother     Hypertension Sister     High Cholesterol Sister     Hypertension Brother     Anesth Problems Neg Hx       Past Surgical History:   Procedure Laterality Date    COLONOSCOPY N/A 11/17/2017    COLONOSCOPY performed by Delaney Kumar MD at [facility]. [facility] [address] [facility], DIAGNOSTIC  2007    due 2017    HX HIP REPLACEMENT Bilateral 2017 & 07/08/2020    HX HIP REPLACEMENT Bilateral     HX ORTHOPAEDIC      HX OVARIAN CYST REMOVAL  1974    NATALIE BIOPSY BREAST STEREOTACTIC Right 2010    benign    NEUROLOGICAL PROCEDURE UNLISTED  2020    TRIGGER FINGER, CYST REMOVAL, TENDON RELEASE      Past Medical History:   Diagnosis Date    Allergic asthma     SEASONAL AND DOG ALLERGIES    Arthritis     Cyst of left kidney 01/26/2020    Hypercholesterolemia 2001    Hypertension 2001    [facility]    Plantar fasciitis of left foot       Current Outpatient Medications   Medication Sig Dispense Refill    albuterol (PROVENTIL HFA, VENTOLIN HFA, PROAIR HFA) 90 mcg/actuation inhaler Take 2 Puffs by inhalation every six (6) hours as needed for Wheezing or Cough. 1 g 1    irbesartan (AVAPRO) 300 mg tablet Take 1 Tablet by mouth daily. 90 Tablet 0    atorvastatin (LIPITOR) 40 mg tablet Take 1 Tablet by mouth nightly. 90 Tablet 1    cholecalciferol (Vitamin D3) 25 mcg (1,000 unit) cap Take 1 Capsule by mouth daily. 90 Capsule 1    indomethacin (INDOCIN) 25 mg capsule Take 25 mg by mouth three (3) times daily as needed.  montelukast (Singulair) 10 mg tablet Take 1 Tablet by mouth daily. (Patient not taking: Reported on 10/1/2021) 90 Tablet 0        Assessment/ Plan:   Diagnoses and all orders for this visit:    1. Essential hypertension, benign  -   Stable with   irbesartan (AVAPRO) 300 mg tablet; Take 1 Tablet by mouth daily. 2. Hypercholesterolemia  -     atorvastatin (LIPITOR) 40 mg tablet; Take 1 Tablet by mouth nightly. 3. Allergic rhinitis, unspecified seasonality, unspecified trigger      Stable. Encouraged to continue to work on quitting smoking. Medication risks/benefits/costs/interactions/alternatives discussed with patient. Advised patient to call back or return to office if symptoms worsen/change/persist. If patient cannot reach us or should anything more severe/urgent arise he/she should proceed directly to the nearest emergency department. Discussed expected course/resolution/complications of diagnosis in detail with patient. Patient given a written after visit summary which includes her diagnoses, current medications and vitals. Patient expressed understanding with the diagnosis and plan.      Follow-up and Dispositions    · Return in about 1 month (around 11/1/2021), or if symptoms worsen or fail to improve, for welcome to medicare. Aziza Rodriguez

## 2021-10-01 NOTE — PROGRESS NOTES
Chief Complaint   Patient presents with    Follow-up     on medications       Health Maintenance Due   Topic    Hepatitis C Screening     Pneumococcal 0-64 years (1 of 2 - PPSV23)    DTaP/Tdap/Td series (1 - Tdap)    Shingrix Vaccine Age 50> (1 of 2)    Cervical cancer screen     Flu Vaccine (1)        1. Have you been to the ER, urgent care clinic since your last visit? Hospitalized since your last visit? No    2. Have you seen or consulted any other health care providers outside of the [address] [place] [address] since your last visit? Include any pap smears or colon screening.  No    Visit Vitals  BP (!) 146/78 (BP 1 Location: Right arm, BP Patient Position: Sitting, BP Cuff Size: Adult)   Pulse 74   Temp 97.8 °F (36.6 °C) (Temporal)   Resp 18   Ht 4' 11\" (1.499 m)   Wt 137 lb (62.1 kg)   SpO2 98%   BMI 27.67 kg/m²

## 2021-10-03 RX ORDER — IRBESARTAN 300 MG/1
300 TABLET ORAL DAILY
Qty: 90 TABLET | Refills: 1 | Status: SHIPPED | OUTPATIENT
Start: 2021-10-03 | End: 2022-02-15 | Stop reason: SDUPTHER

## 2021-10-03 RX ORDER — ATORVASTATIN CALCIUM 40 MG/1
40 TABLET, FILM COATED ORAL
Qty: 90 TABLET | Refills: 1 | Status: SHIPPED | OUTPATIENT
Start: 2021-10-03 | End: 2022-02-15 | Stop reason: SDUPTHER

## 2021-10-20 DIAGNOSIS — J30.89 NON-SEASONAL ALLERGIC RHINITIS, UNSPECIFIED TRIGGER: ICD-10-CM

## 2021-10-20 RX ORDER — MONTELUKAST SODIUM 10 MG/1
10 TABLET ORAL DAILY
Qty: 90 TABLET | Refills: 0 | OUTPATIENT
Start: 2021-10-20

## 2021-11-06 ENCOUNTER — HOSPITAL ENCOUNTER (EMERGENCY)
Age: 66
Discharge: HOME | End: 2021-11-06
Attending: EMERGENCY MEDICINE
Payer: MEDICARE

## 2021-11-06 VITALS
OXYGEN SATURATION: 99 % | SYSTOLIC BLOOD PRESSURE: 155 MMHG | BODY MASS INDEX: 27.83 KG/M2 | DIASTOLIC BLOOD PRESSURE: 92 MMHG | HEART RATE: 86 BPM | TEMPERATURE: 97.2 F | RESPIRATION RATE: 18 BRPM | WEIGHT: 137.79 LBS

## 2021-11-06 DIAGNOSIS — I10 HYPERTENSION, UNSPECIFIED TYPE: Primary | ICD-10-CM

## 2021-11-06 LAB
ALBUMIN SERPL-MCNC: 3.9 G/DL (ref 3.5–5)
ALBUMIN/GLOB SERPL: 1.1 {RATIO} (ref 1.1–2.2)
ALP SERPL-CCNC: 122 U/L (ref 45–117)
ALT SERPL-CCNC: 17 U/L (ref 12–78)
ANION GAP SERPL CALC-SCNC: 10 MMOL/L (ref 5–15)
AST SERPL-CCNC: 15 U/L (ref 15–37)
BASOPHILS # BLD: 0 K/UL (ref 0–0.1)
BASOPHILS NFR BLD: 0 % (ref 0–1)
BILIRUB SERPL-MCNC: 0.4 MG/DL (ref 0.2–1)
BUN SERPL-MCNC: 9 MG/DL (ref 6–20)
BUN/CREAT SERPL: 12 (ref 12–20)
CALCIUM SERPL-MCNC: 9.6 MG/DL (ref 8.5–10.1)
CHLORIDE SERPL-SCNC: 104 MMOL/L (ref 97–108)
CO2 SERPL-SCNC: 28 MMOL/L (ref 21–32)
CREAT SERPL-MCNC: 0.77 MG/DL (ref 0.55–1.02)
DIFFERENTIAL METHOD BLD: NORMAL
EOSINOPHIL # BLD: 0.1 K/UL (ref 0–0.4)
EOSINOPHIL NFR BLD: 1 % (ref 0–7)
ERYTHROCYTE [DISTWIDTH] IN BLOOD BY AUTOMATED COUNT: 12.6 % (ref 11.5–14.5)
GLOBULIN SER CALC-MCNC: 3.7 G/DL (ref 2–4)
GLUCOSE SERPL-MCNC: 97 MG/DL (ref 65–100)
HCT VFR BLD AUTO: 38.8 % (ref 35–47)
HGB BLD-MCNC: 12.8 G/DL (ref 11.5–16)
IMM GRANULOCYTES # BLD AUTO: 0 K/UL (ref 0–0.04)
IMM GRANULOCYTES NFR BLD AUTO: 0 % (ref 0–0.5)
LYMPHOCYTES # BLD: 1.8 K/UL (ref 0.8–3.5)
LYMPHOCYTES NFR BLD: 20 % (ref 12–49)
MCH RBC QN AUTO: 30.8 PG (ref 26–34)
MCHC RBC AUTO-ENTMCNC: 33 G/DL (ref 30–36.5)
MCV RBC AUTO: 93.5 FL (ref 80–99)
MONOCYTES # BLD: 0.6 K/UL (ref 0–1)
MONOCYTES NFR BLD: 7 % (ref 5–13)
NEUTS SEG # BLD: 6.3 K/UL (ref 1.8–8)
NEUTS SEG NFR BLD: 72 % (ref 32–75)
NRBC # BLD: 0 K/UL (ref 0–0.01)
NRBC BLD-RTO: 0 PER 100 WBC
PLATELET # BLD AUTO: 234 K/UL (ref 150–400)
PMV BLD AUTO: 9.3 FL (ref 8.9–12.9)
POTASSIUM SERPL-SCNC: 3.8 MMOL/L (ref 3.5–5.1)
PROT SERPL-MCNC: 7.6 G/DL (ref 6.4–8.2)
RBC # BLD AUTO: 4.15 M/UL (ref 3.8–5.2)
SODIUM SERPL-SCNC: 142 MMOL/L (ref 136–145)
TROPONIN-HIGH SENSITIVITY: 8 NG/L (ref 0–51)
WBC # BLD AUTO: 8.9 K/UL (ref 3.6–11)

## 2021-11-06 PROCEDURE — 36415 COLL VENOUS BLD VENIPUNCTURE: CPT

## 2021-11-06 PROCEDURE — 80053 COMPREHEN METABOLIC PANEL: CPT

## 2021-11-06 PROCEDURE — 93005 ELECTROCARDIOGRAM TRACING: CPT

## 2021-11-06 PROCEDURE — 99283 EMERGENCY DEPT VISIT LOW MDM: CPT

## 2021-11-06 PROCEDURE — 84484 ASSAY OF TROPONIN QUANT: CPT

## 2021-11-06 PROCEDURE — 85025 COMPLETE CBC W/AUTO DIFF WBC: CPT

## 2021-11-06 NOTE — ED TRIAGE NOTES
Triage Note: Patient arrives to ER complaining of hypertension. Patient states this morning when she was getting ready she felt lightheaded so she checked her BP and it was elevated. Takes irbesartan 300 mg daily.

## 2021-11-07 NOTE — ED NOTES
D/c'd pt per orders. Pt VSS, pt given d/c instructions and verbalized understanding. Declined w/c and left ambulatory in care of daughter. IV was also d/c'd.

## 2021-11-07 NOTE — ED PROVIDER NOTES
History of hypertension, hyperlipidemia, seasonal allergies, arthritis, osteoporosis. She presents over concerns about elevated blood pressures. She states that she awoke with lightheadedness this morning. She checked her blood pressure and it was elevated. It has stayed in the 879Q and 545Q (systolic) throughout the day. She denies chest pain, dyspnea, headache. Her PCP has recently stopped her HCTZ because her blood pressures were running low. She still takes irbesartan.            Past Medical History:   Diagnosis Date    Allergic asthma     SEASONAL AND DOG ALLERGIES    Arthritis     Cyst of left kidney 01/26/2020    Hypercholesterolemia 2001    Hypertension 2001    Non-seasonal allergic rhinitis 6-2011    dr Kirstie Cole    Osteoporosis 2006    Plantar fasciitis of left foot        Past Surgical History:   Procedure Laterality Date    COLONOSCOPY N/A 11/17/2017    COL[facility] performed by [facility] at [facility] ENDOSCOPY    ENDOSCOPY, COLON, DIAGNOSTIC  2007    due 2017    HX HIP REPLACEMENT Bilateral 2017 & 07/08/2020    HX HIP REPLACEMENT Bilateral     HX ORTHOPAEDIC      HX OVARIAN CYST REMOVAL  1974    NATALIE BIOPSY BREAST STEREOTACTIC Right 2010    benign    NEUROLOGICAL PROCEDURE UNLISTED  2020    TRIGGER FINGER, CYST REMOVAL, TENDON RELEASE         Family History:   Problem Relation Age of Onset    Breast Cancer Maternal Aunt     Dementia Mother     Hypertension Mother     High Cholesterol Mother     Diabetes Mother     Cancer Father         COLON    Hypertension Sister     Diabetes Sister     Hypertension Brother     Heart Disease Brother     Hypertension Sister     High Cholesterol Sister     Hypertension Brother     Anesth Problems Neg Hx        Social History     Socioeconomic History    Marital status:      Spouse name: Not on file    Number of children: Not on file    Years of education: Not on file    Highest education level: Not on file   Occupational History    Not on file   Tobacco Use    Smoking status: Current Every Day Smoker     Packs/day: 0.50     Years: 37.00     Pack years: 18.50     Types: Cigarettes    Smokeless tobacco: Never Used   Vaping Use    Vaping Use: Never used   Substance and Sexual Activity    Alcohol use: Yes     Comment: SOCIALLY    Drug use: No    Sexual activity: Not Currently     Partners: Male   Other Topics Concern    Not on file   Social History Narrative    Not on file     Social Determinants of Health     Financial Resource Strain:     Difficulty of Paying Living Expenses: Not on file   Food Insecurity:     Worried About Running Out of Food in the Last Year: Not on file    Destiny of Food in the Last Year: Not on file   Transportation Needs:     Lack of Transportation (Medical): Not on file    Lack of Transportation (Non-Medical): Not on file   Physical Activity:     Days of Exercise per Week: Not on file    Minutes of Exercise per Session: Not on file   Stress:     Feeling of Stress : Not on file   Social Connections:     Frequency of Communication with Friends and Family: Not on file    Frequency of Social Gatherings with Friends and Family: Not on file    Attends Restorationist Services: Not on file    Active Member of [address] [place] [address] [employer] or Organizations: Not on file    Attends Club or Organization Meetings: Not on file    Marital Status: Not on file   Intimate Partner Violence:     Fear of Current or Ex-Partner: Not on file    Emotionally Abused: Not on file    Physically Abused: Not on file    Sexually Abused: Not on file   Housing Stability:     Unable to Pay for Housing in the Last Year: Not on file    Number of [place] in the Last Year: Not on file    Unstable Housing in the Last Year: Not on file         ALLERGIES: Pollen extracts    Review of Systems   All other systems reviewed and are negative.       Vitals:    11/06/21 1812 11/06/21 2026   BP: (!) 183/85 (!) 155/92   Pulse: 94 86   Resp: 18 18   Temp: 97.2 °F (36.2 °C)    SpO2: 99% 99%   Weight: 62.5 kg (137 lb 12.6 oz)             Physical Exam  Vitals and nursing note reviewed. Constitutional:       Appearance: She is well-developed. HENT:      Head: Normocephalic and atraumatic. Eyes:      Conjunctiva/sclera: Conjunctivae normal.   Neck:      Trachea: No tracheal deviation. Cardiovascular:      Rate and Rhythm: Normal rate and regular rhythm. Heart sounds: Normal heart sounds. No murmur heard. No friction rub. No gallop. Pulmonary:      Effort: Pulmonary effort is normal.      Breath sounds: Normal breath sounds. Abdominal:      Palpations: Abdomen is soft. Tenderness: There is no abdominal tenderness. Musculoskeletal:         General: No deformity. Cervical back: Neck supple. Skin:     General: Skin is warm and dry. Neurological:      Mental Status: She is alert. Comments: oriented          MDM       Procedures    EKG: Normal sinus rhythm; rate of 97; normal ST, Ran Pace MD    Progress Note:  Results, treatment, and follow up plan have been discussed with patient/daughter-in-law. Questions were answered. Tino Alicea MD    Assessment/plan: Elevated blood pressure -reassuring appearance/exam with stable vital signs. CBC, CMP, troponin, EKG okay. I have encouraged her to monitor her blood pressures over the next few days and restart her HCTZ if they remain elevated. PCP follow-up. Return precautions discussed.   Tino Alicea MD

## 2021-11-08 ENCOUNTER — PATIENT OUTREACH (OUTPATIENT)
Dept: OTHER | Age: 66
End: 2021-11-08

## 2021-11-08 LAB
ATRIAL RATE: 97 BPM
CALCULATED P AXIS, ECG09: 58 DEGREES
CALCULATED R AXIS, ECG10: 10 DEGREES
CALCULATED T AXIS, ECG11: 43 DEGREES
DIAGNOSIS, 93000: NORMAL
P-R INTERVAL, ECG05: 118 MS
Q-T INTERVAL, ECG07: 350 MS
QRS DURATION, ECG06: 68 MS
QTC CALCULATION (BEZET), ECG08: 444 MS
VENTRICULAR RATE, ECG03: 97 BPM

## 2021-11-08 NOTE — PROGRESS NOTES
Verified  and address for HIPAA security. Introduced [employer] for patient. Does not qualify for Case Management through [facility] due to non-[employer] coverage. Patient reports she has retired and is no longer covered under [facility] insurance, has Medicare and supplement plan. Patient reports she is monitoring BP and has follow up scheduled with PCP on 11/15/21. Reports readings range from 138/84 to 153/95. Has restarted HCTZ as directed by ER physician. Reviewed Red Flag Symptoms and when to return to ER. Patient verbalized understanding.

## 2021-11-15 ENCOUNTER — OFFICE VISIT (OUTPATIENT)
Dept: PRIMARY CARE CLINIC | Age: 66
End: 2021-11-15
Payer: MEDICARE

## 2021-11-15 VITALS
RESPIRATION RATE: 16 BRPM | HEIGHT: 59 IN | WEIGHT: 134 LBS | OXYGEN SATURATION: 100 % | DIASTOLIC BLOOD PRESSURE: 64 MMHG | TEMPERATURE: 98 F | BODY MASS INDEX: 27.01 KG/M2 | SYSTOLIC BLOOD PRESSURE: 102 MMHG | HEART RATE: 96 BPM

## 2021-11-15 DIAGNOSIS — Z71.89 ADVANCE DIRECTIVE DISCUSSED WITH PATIENT: ICD-10-CM

## 2021-11-15 DIAGNOSIS — I10 ESSENTIAL HYPERTENSION, BENIGN: ICD-10-CM

## 2021-11-15 DIAGNOSIS — Z13.31 SCREENING FOR DEPRESSION: ICD-10-CM

## 2021-11-15 DIAGNOSIS — R73.09 ELEVATED RANDOM BLOOD GLUCOSE LEVEL: ICD-10-CM

## 2021-11-15 DIAGNOSIS — Z00.00 WELCOME TO MEDICARE PREVENTIVE VISIT: Primary | ICD-10-CM

## 2021-11-15 DIAGNOSIS — F17.200 CURRENT EVERY DAY SMOKER: ICD-10-CM

## 2021-11-15 LAB
EST. AVERAGE GLUCOSE BLD GHB EST-MCNC: 117 MG/DL
HBA1C MFR BLD: 5.7 % (ref 4–5.6)

## 2021-11-15 PROCEDURE — G8427 DOCREV CUR MEDS BY ELIG CLIN: HCPCS | Performed by: FAMILY MEDICINE

## 2021-11-15 PROCEDURE — 3017F COLORECTAL CA SCREEN DOC REV: CPT | Performed by: FAMILY MEDICINE

## 2021-11-15 PROCEDURE — 99212 OFFICE O/P EST SF 10 MIN: CPT | Performed by: FAMILY MEDICINE

## 2021-11-15 PROCEDURE — G8536 NO DOC ELDER MAL SCRN: HCPCS | Performed by: FAMILY MEDICINE

## 2021-11-15 PROCEDURE — G8752 SYS BP LESS 140: HCPCS | Performed by: FAMILY MEDICINE

## 2021-11-15 PROCEDURE — G8419 CALC BMI OUT NRM PARAM NOF/U: HCPCS | Performed by: FAMILY MEDICINE

## 2021-11-15 PROCEDURE — G9899 SCRN MAM PERF RSLTS DOC: HCPCS | Performed by: FAMILY MEDICINE

## 2021-11-15 PROCEDURE — G8510 SCR DEP NEG, NO PLAN REQD: HCPCS | Performed by: FAMILY MEDICINE

## 2021-11-15 PROCEDURE — 1090F PRES/ABSN URINE INCON ASSESS: CPT | Performed by: FAMILY MEDICINE

## 2021-11-15 PROCEDURE — 1101F PT FALLS ASSESS-DOCD LE1/YR: CPT | Performed by: FAMILY MEDICINE

## 2021-11-15 PROCEDURE — G0402 INITIAL PREVENTIVE EXAM: HCPCS | Performed by: FAMILY MEDICINE

## 2021-11-15 PROCEDURE — G8754 DIAS BP LESS 90: HCPCS | Performed by: FAMILY MEDICINE

## 2021-11-15 RX ORDER — HYDROCHLOROTHIAZIDE 12.5 MG/1
12.5 CAPSULE ORAL DAILY
COMMUNITY
End: 2021-11-15 | Stop reason: SDUPTHER

## 2021-11-15 RX ORDER — HYDROCHLOROTHIAZIDE 12.5 MG/1
12.5 CAPSULE ORAL DAILY
Qty: 90 CAPSULE | Refills: 0 | Status: SHIPPED | OUTPATIENT
Start: 2021-11-15 | End: 2022-02-09

## 2021-11-15 RX ORDER — ZOSTER VACCINE RECOMBINANT, ADJUVANTED 50 MCG/0.5
KIT INTRAMUSCULAR
Qty: 0.5 ML | Refills: 1 | Status: SHIPPED | OUTPATIENT
Start: 2021-11-15

## 2021-11-15 NOTE — PROGRESS NOTES
This is a \"Welcome to [employer]"  Initial Preventive Physical Examination (IPPE) providing Personalized Prevention Plan Services (Performed in the first 12 months of enrollment)    I have reviewed the patient's medical history in detail and updated the computerized patient record. She  is a 73y.o. year old female with hx of HTN, HLD, smoker, osteopenia is here for follow up. She reports that her BP was very high on 11/6/2021 so [facility] she was restarted on HCTZ 12.5 mg. She has been taking  Irbesartan 300 mg. BP has been well controlled. ER records reviewed.      HLD: Takes Lipitor 40 mg daily. No side effects. Well controlled.      Non seasonal allergy: Well controlled with Singulair.      Denies any chest pain, soa, cough, abdominal pain. Lab Results   Component Value Date/Time    Hemoglobin A1c 5.8 (H) 06/19/2020 12:28 PM           Assessment/Plan   Education and counseling provided:  Are appropriate based on today's review and evaluation  End-of-Life planning (with patient's consent)  Pneumococcal Vaccine  Colorectal cancer screening tests  Bone mass measurement (DEXA)  Screening for glaucoma    1. Welcome to Medicare preventive visit  2. Essential hypertension, benign  -  Well controled with current meds. MICROALBUMIN, UR, RAND W/ MICROALB/CREAT RATIO; Future  -     hydroCHLOROthiazide (MICROZIDE) 12.5 mg capsule; Take 1 Capsule by mouth daily. , Normal, Disp-90 Capsule, R-0  3. Screening for depression-  Denies any depression. 4. Elevated random blood glucose level  -     HEMOGLOBIN A1C WITH EAG; Future  5. Advance directive discussed with patient  -     REFERRAL TO ACP CLINICAL SPECIALIST  6. Current every day smoker       Counseling provided.      Depression Risk Screen     3 most recent PHQ Screens 11/15/2021   Little interest or pleasure in doing things Not at all   Feeling down, depressed, irritable, or hopeless Not at all   Total Score PHQ 2 0       Alcohol Risk Screen    Do you average more than 1 drink per night or more than 7 drinks a week:  No    On any one occasion in the past three months have you have had more than 3 drinks containing alcohol:  No          Functional Ability and Level of Safety    Diet: No special diet      Hearing: Hearing is good. Vision Screening:  Vision is good. wears glasses. No exam data present      Activities of Daily Living: The home contains: grab bars and rugs  Patient does total self care      Ambulation: with no difficulty      Exercise level: moderately active     Fall Risk Screen:  Fall Risk Assessment, last 12 mths 11/15/2021   Able to walk? Yes   Fall in past 12 months? 0   Do you feel unsteady? 0   Are you worried about falling 0      Abuse Screen:  Patient is not abused       Screening EKG   EKG order placed: No Not required. She just had  an EKG done on 11/6/2021. End of Life Planning   Advanced care planning directives were discussed with the patient . ACP referral provided.       Health Maintenance Due     Health Maintenance Due   Topic Date Due    Hepatitis C Screening  Never done    DTaP/Tdap/Td series (1 - Tdap) Never done    Shingrix Vaccine Age 50> (1 of 2) Never done    Cervical cancer screen  08/09/2016       Patient Care Team   Patient Care Team:  Vanessa De La Rosa MD as PCP - General (Family Medicine)  Vanessa De La Rosa MD as PCP - [address] [place] [address] Provider  Alayna Jackson MD (Urology)    History     Past Medical History:   Diagnosis Date    Allergic asthma     SEASONAL AND DOG ALLERGIES    Arthritis     Cyst of left kidney 01/26/2020    Hypercholesterolemia 2001    Hypertension 2001    Non-seasonal allergic rhinitis 6-2011    dr Michael Nayak Osteoporosis 2006    Plantar fasciitis of left foot       Past Surgical History:   Procedure Laterality Date    COLONOSCOPY N/A 11/17/2017    COLONOSCOPY performed by Darrell Favre, MD at [address], DIAGNOSTIC  2007    due 2017    HX HIP REPLACEMENT Bilateral 2017 & 07/08/2020    HX HIP REPLACEMENT Bilateral     HX ORTHOPAEDIC      HX OVARIAN CYST REMOVAL  1974    NATALIE BIOPSY BREAST STEREOTACTIC Right 2010    benign    NEUROLOGICAL PROCEDURE UNLISTED  2020    TRIGGER FINGER, CYST REMOVAL, TENDON RELEASE     Current Outpatient Medications   Medication Sig Dispense Refill    hydroCHLOROthiazide (MICROZIDE) 12.5 mg capsule Take 12.5 mg by mouth daily.  varicella-zoster recombinant, PF, (Shingrix, PF,) 50 mcg/0.5 mL susr injection 0.5mL by IntraMUSCular route once now and then repeat in 2-6 months 0.5 mL 1    irbesartan (AVAPRO) 300 mg tablet Take 1 Tablet by mouth daily. 90 Tablet 1    atorvastatin (LIPITOR) 40 mg tablet Take 1 Tablet by mouth nightly. 90 Tablet 1    albuterol (PROVENTIL HFA, VENTOLIN HFA, PROAIR HFA) 90 mcg/actuation inhaler Take 2 Puffs by inhalation every six (6) hours as needed for Wheezing or Cough. 1 g 1    montelukast (Singulair) 10 mg tablet Take 1 Tablet by mouth daily. 90 Tablet 0    cholecalciferol (Vitamin D3) 25 mcg (1,000 unit) cap Take 1 Capsule by mouth daily. 90 Capsule 1    indomethacin (INDOCIN) 25 mg capsule Take 25 mg by mouth three (3) times daily as needed.        Allergies   Allergen Reactions    Pollen Extracts Itching       Family History   Problem Relation Age of Onset    Breast Cancer Maternal Aunt     Dementia Mother     Hypertension Mother     High Cholesterol Mother     Diabetes Mother     Cancer Father         COLON    Hypertension Sister     Diabetes Sister     Hypertension Brother     Heart Disease Brother     Hypertension Sister     High Cholesterol Sister     Hypertension Brother     Anesth Problems Neg Hx      Social History     Tobacco Use    Smoking status: Current Every Day Smoker     Packs/day: 0.50     Years: 37.00     Pack years: 18.50     Types: Cigarettes    Smokeless tobacco: Never Used   Substance Use Topics    Alcohol use: Yes     Comment: SOCIALLY Follow-up and Dispositions    · Return in about 3 months (around 2/15/2022) for Bring BP log book. Becky Cummins MD

## 2021-11-15 NOTE — PROGRESS NOTES
NN Medicare Wellness Visit      Jameson Casas is a 73 y.o. female and presents for Annual Medicare Wellness Visit. Vitals:    11/15/21 1147   BP: 102/64   Pulse: 96   Resp: 16   Temp: 98 °F (36.7 °C)   TempSrc: Temporal   SpO2: 100%   Weight: 134 lb (60.8 kg)   Height: 4' 11\" (1.499 m)   PainSc:   0 - No pain        Depression Screen:   3 most recent PHQ Screens 11/15/2021   Little interest or pleasure in doing things Not at all   Feeling down, depressed, irritable, or hopeless Not at all   Total Score PHQ 2 0       Fall Risk Assessment:    Fall Risk Assessment, last 12 mths 11/15/2021   Able to walk? Yes   Fall in past 12 months? 0   Do you feel unsteady? 0   Are you worried about falling 0       Abuse Screen:   Abuse Screening Questionnaire 11/15/2021   Do you ever feel afraid of your partner? N   Are you in a relationship with someone who physically or mentally threatens you? N   Is it safe for you to go home? Y       Health Maintenance Due   Topic    Hepatitis C Screening     DTaP/Tdap/Td series (1 - Tdap)    Shingrix Vaccine Age 49> (1 of 2)    Cervical cancer screen     Medicare Yearly Exam        1. Have you been to the ER, urgent care clinic since your last visit? Hospitalized since your last visit? YES - ED - BP high     2. Have you seen or consulted any other health care providers outside of the [address] [place] [address] since your last visit? Include any pap smears or colon screening.  No

## 2021-11-16 NOTE — PROGRESS NOTES
Sent via my chart. Ms. Tran ,    Blood sugar level has improved from last time. Keep up the good work!     Dr. Nahomy Rod

## 2021-11-17 ENCOUNTER — TRANSCRIBE ORDER (OUTPATIENT)
Dept: SCHEDULING | Age: 66
End: 2021-11-17

## 2021-11-17 ENCOUNTER — PATIENT OUTREACH (OUTPATIENT)
Dept: CASE MANAGEMENT | Age: 66
End: 2021-11-17

## 2021-11-17 DIAGNOSIS — Z12.31 VISIT FOR SCREENING MAMMOGRAM: Primary | ICD-10-CM

## 2021-11-17 NOTE — ACP (ADVANCE CARE PLANNING)
Advance Care Planning   Ambulatory ACP Specialist Patient Outreach    Date:  11/17/2021    ACP Specialist:  Julissa Khan LPN    Outreach call to patient in follow-up to ACP Specialist referral from:    [x] PCP  [] Provider   [] Ambulatory Care Management [] Other     For:                  [x] Advance Directive Assistance              [] Complete Portable DNR order              [] Complete POST/MOST              [] Code Status Discussion             [] Discuss Goals of Care             [] Early ACP Decision-Making              [] Other (Specify)    Date Referral Received: 11/16/21    Today's Outreach:  [x] First   [] Second  [] Third       Third outreach made by: [] Phone  [] Email / mail    [] MyChart     Intervention:  [x] Spoke with Patient   [] Left VM requesting return call      Outcome: Spoke with pt who wishes to move forward with having a conversation about Advance Care Planning. Appt with ACP specialist is scheduled for 12/2/21 at 11am. ACP documents have been e-mailed to pt for review prior to appt. Next Step:   [x] ACP scheduled conversation  [] Outreach again in one week               [x] Email / Mail ACP Info Sheets  [] Email / Mail Advance Directive   [] Closing referral.  Routing closure to referring provider/staff and to ACP Specialist . [] Closure letter mailed to patient with invitation to contact ACP Specialist if / when ready.   Thank you for this referral.

## 2021-11-22 DIAGNOSIS — J30.89 NON-SEASONAL ALLERGIC RHINITIS, UNSPECIFIED TRIGGER: ICD-10-CM

## 2021-11-22 RX ORDER — MONTELUKAST SODIUM 10 MG/1
10 TABLET ORAL DAILY
Qty: 90 TABLET | Refills: 1 | Status: SHIPPED | OUTPATIENT
Start: 2021-11-22 | End: 2022-02-15 | Stop reason: SDUPTHER

## 2021-12-01 ENCOUNTER — PATIENT OUTREACH (OUTPATIENT)
Dept: CASE MANAGEMENT | Age: 66
End: 2021-12-01

## 2021-12-01 NOTE — ACP (ADVANCE CARE PLANNING)
Appointment with ACP specialist has been rescheduled to 12/3/21 at [address] [place] [address].

## 2021-12-03 ENCOUNTER — DOCUMENTATION ONLY (OUTPATIENT)
Dept: CASE MANAGEMENT | Age: 66
End: 2021-12-03

## 2021-12-03 NOTE — ACP (ADVANCE CARE PLANNING)
Advance Care Planning     Advance Care Planning Clinical Specialist  Conversation Note      Date of ACP Conversation: 12/03/21    Conversation Conducted with:  Patient with Decision Making Capacity    ACP Clinical Specialist: cMkayla Saab RN    Health Care Decision Maker:    Current Designated Health Care Decision Maker:     Primary Decision Maker: Sherron Núñez - [phone]    Secondary Decision Maker: Chip Abdi - Son - [phone]    Supplemental (Other) Decision Maker: Radha [place] - Sister - [phone]    Care Preferences    Hospitalization: \"If your health worsens and it becomes clear that your chance of recovery is unlikely, what would your preference be regarding hospitalization? \"    Choice:  []  The patient wants hospitalization  [x]  The patient prefers comfort-focused treatment without hospitalization. Ventilation: \"If you were in your present state of health and suddenly became very ill and were unable to breathe on your own, what would your preference be about the use of a ventilator (breathing machine) if it were available to you? \"      If patient would desire the use of a ventilator (breathing machine), Pt stated:  \"I would like to defer this decision to my [address]. I do not want to be on a ventilator for an extended period of time. \"    \"If your health worsens and it becomes clear that your chance of recovery is unlikely, what would your preference be about the use of a ventilator (breathing machine) if it were available to you? \"     Would the patient desire the use of a ventilator (breathing machine)? Pt stated:  \"I would like to defer this decision to my [address]. I do not want to be on a ventilator for an extended period of time. \"          Resuscitation  \"CPR works best to restart the heart when there is a sudden event, like a heart attack, in someone who is otherwise healthy.  Unfortunately, CPR does not typically restart the heart for people who have serious health conditions or who are very sick. \"    \"In the event your heart stopped as a result of an underlying serious health condition, would you want attempts to be made to restart your heart (answer \"yes\" for attempt to resuscitate) or would you prefer a natural death  YES  [x] Yes  [] No   Educated Patient / Decision Maker regarding differences between Advance Directives and portable DNR orders. Length of ACP Conversation in minutes:  39    Conversation Outcomes:  [x] ACP discussion completed  [] Existing advance directive reviewed with patient; no changes to patient's previously recorded wishes   [x] New Advance Directive completed   [] Portable Do Not Resuscitate prepared for Provider review and signature  [] POLST/POST/MOLST/MOST prepared for Provider review and signature      Follow-up plan:    [x] Schedule follow-up conversation to continue planning  [] Referred individual to Provider for additional questions/concerns   [x] Advised patient/agent/surrogate to review completed ACP document and update if needed with changes in condition, patient preferences or care setting     [x] This note routed to one or more involved healthcare providers    RN reviewed above questions with pt and her answers are indicated. An AMD was completed and sent to pt via email through [employer]. Pt stated that she will review the document with her health care agents and then will sign and return. She stated that it may take 2 weeks as her son is going on vacation. RN advised pt that she will call back on Dec. 17 to check on status.  Lizbeth Gregorio RN

## 2021-12-17 ENCOUNTER — DOCUMENTATION ONLY (OUTPATIENT)
Dept: CASE MANAGEMENT | Age: 66
End: 2021-12-17

## 2021-12-17 NOTE — ACP (ADVANCE CARE PLANNING)
Advance Care Planning   Ambulatory ACP Specialist Patient Outreach    Date:  12/17/2021    ACP Specialist:  Ciaran Thomason RN    Outreach call to patient in follow-up to ACP Specialist referral from:    [x] PCP  [] Provider   [] Ambulatory Care Management [] Other     For:                  [x] Advance Directive Assistance              [] Complete Portable DNR order              [] Complete POST/MOST              [] Code Status Discussion             [] Discuss Goals of Care             [] Early ACP Decision-Making              [] Other (Specify)    Date Referral Received: 11/16/21    Today's Outreach:  [] First   [] Second  [x] Third       Third outreach made by: [x] Phone  [] Email / mail    [] [employer]     Intervention:  [x] Spoke with Patient   [] Left VM requesting return call      Outcome: RN called pt for follow up to ACP conversation on 12/3/21. On that date, An AMD was completed per pt's instructions and it was emailed to pt through [employer]. She requested a follow up call on 12/17/21 to provide time to discuss the AMD with her sons. During today's call, pt stated that her sons are not in agreement with her completing an AMD at this time. RN advised her that this referral will be closed and she may request a new referral from her physician when her sons and she are ready to proceed. Next Step:   [] ACP scheduled conversation  [] Outreach again in one week               [] Email / Mail ACP Info Sheets  [] Email / Mail Advance Directive   [] Closing referral.  Routing closure to referring provider/staff and to ACP Specialist . [x] Closure letter emailed to patient with invitation to request a new referral from her physician when ready.   Thank you for this referral.

## 2022-02-02 ENCOUNTER — HOSPITAL ENCOUNTER (OUTPATIENT)
Dept: MAMMOGRAPHY | Age: 67
Discharge: HOME | End: 2022-02-02
Attending: FAMILY MEDICINE
Payer: MEDICARE

## 2022-02-02 DIAGNOSIS — Z12.31 VISIT FOR SCREENING MAMMOGRAM: ICD-10-CM

## 2022-02-02 PROCEDURE — 77063 BREAST TOMOSYNTHESIS BI: CPT

## 2022-02-09 DIAGNOSIS — I10 ESSENTIAL HYPERTENSION, BENIGN: ICD-10-CM

## 2022-02-09 RX ORDER — HYDROCHLOROTHIAZIDE 12.5 MG/1
CAPSULE ORAL
Qty: 90 CAPSULE | Refills: 0 | Status: SHIPPED | OUTPATIENT
Start: 2022-02-09 | End: 2022-02-15 | Stop reason: SDUPTHER

## 2022-02-15 ENCOUNTER — OFFICE VISIT (OUTPATIENT)
Dept: PRIMARY CARE CLINIC | Age: 67
End: 2022-02-15
Payer: MEDICARE

## 2022-02-15 VITALS
HEIGHT: 59 IN | RESPIRATION RATE: 16 BRPM | SYSTOLIC BLOOD PRESSURE: 118 MMHG | DIASTOLIC BLOOD PRESSURE: 72 MMHG | TEMPERATURE: 97.8 F | HEART RATE: 85 BPM | BODY MASS INDEX: 27.62 KG/M2 | WEIGHT: 137 LBS | OXYGEN SATURATION: 100 %

## 2022-02-15 DIAGNOSIS — E78.00 HYPERCHOLESTEROLEMIA: ICD-10-CM

## 2022-02-15 DIAGNOSIS — J30.89 NON-SEASONAL ALLERGIC RHINITIS, UNSPECIFIED TRIGGER: ICD-10-CM

## 2022-02-15 DIAGNOSIS — I10 ESSENTIAL HYPERTENSION, BENIGN: ICD-10-CM

## 2022-02-15 PROCEDURE — 99213 OFFICE O/P EST LOW 20 MIN: CPT | Performed by: FAMILY MEDICINE

## 2022-02-15 PROCEDURE — G8752 SYS BP LESS 140: HCPCS | Performed by: FAMILY MEDICINE

## 2022-02-15 PROCEDURE — G9899 SCRN MAM PERF RSLTS DOC: HCPCS | Performed by: FAMILY MEDICINE

## 2022-02-15 PROCEDURE — G8536 NO DOC ELDER MAL SCRN: HCPCS | Performed by: FAMILY MEDICINE

## 2022-02-15 PROCEDURE — G8419 CALC BMI OUT NRM PARAM NOF/U: HCPCS | Performed by: FAMILY MEDICINE

## 2022-02-15 PROCEDURE — G8754 DIAS BP LESS 90: HCPCS | Performed by: FAMILY MEDICINE

## 2022-02-15 PROCEDURE — 1101F PT FALLS ASSESS-DOCD LE1/YR: CPT | Performed by: FAMILY MEDICINE

## 2022-02-15 PROCEDURE — G8510 SCR DEP NEG, NO PLAN REQD: HCPCS | Performed by: FAMILY MEDICINE

## 2022-02-15 PROCEDURE — G8427 DOCREV CUR MEDS BY ELIG CLIN: HCPCS | Performed by: FAMILY MEDICINE

## 2022-02-15 PROCEDURE — 3017F COLORECTAL CA SCREEN DOC REV: CPT | Performed by: FAMILY MEDICINE

## 2022-02-15 PROCEDURE — 1090F PRES/ABSN URINE INCON ASSESS: CPT | Performed by: FAMILY MEDICINE

## 2022-02-15 RX ORDER — MONTELUKAST SODIUM 10 MG/1
10 TABLET ORAL DAILY
Qty: 90 TABLET | Refills: 0 | Status: SHIPPED | OUTPATIENT
Start: 2022-02-15 | End: 2022-06-17 | Stop reason: SDUPTHER

## 2022-02-15 RX ORDER — HYDROCHLOROTHIAZIDE 12.5 MG/1
12.5 CAPSULE ORAL DAILY
Qty: 90 CAPSULE | Refills: 0 | Status: SHIPPED | OUTPATIENT
Start: 2022-02-15 | End: 2022-06-17 | Stop reason: SDUPTHER

## 2022-02-15 RX ORDER — IRBESARTAN 300 MG/1
300 TABLET ORAL DAILY
Qty: 90 TABLET | Refills: 0 | Status: SHIPPED | OUTPATIENT
Start: 2022-02-15 | End: 2022-06-17 | Stop reason: SDUPTHER

## 2022-02-15 RX ORDER — ATORVASTATIN CALCIUM 40 MG/1
40 TABLET, FILM COATED ORAL
Qty: 90 TABLET | Refills: 0 | Status: SHIPPED | OUTPATIENT
Start: 2022-02-15 | End: 2022-05-16 | Stop reason: SDUPTHER

## 2022-02-15 NOTE — PROGRESS NOTES
Identified pt with two pt identifiers(name and ). Chief Complaint   Patient presents with    Hypertension        3 most recent PHQ Screens 2/15/2022   Little interest or pleasure in doing things Not at all   Feeling down, depressed, irritable, or hopeless Not at all   Total Score PHQ 2 0        Vitals:    02/15/22 1029   BP: 118/72   Pulse: 85   Resp: 16   Temp: 97.8 °F (36.6 °C)   TempSrc: Temporal   SpO2: 100%   Weight: 137 lb (62.1 kg)   Height: 4' 11\" (1.499 m)       Health Maintenance Due   Topic    DTaP/Tdap/Td series (1 - Tdap)    Cervical cancer screen     Shingrix Vaccine Age 50> (2 of 2)       1. Have you been to the ER, urgent care clinic since your last visit? Hospitalized since your last visit? No    2. Have you seen or consulted any other health care providers outside of the [address] [place] [address] since your last visit? Include any pap smears or colon screening.  No

## 2022-02-15 NOTE — PROGRESS NOTES
Subjective:     Chief Complaint   Patient presents with    Hypertension        She  is a 73y.o. year old female with hx of HTN, HLD, smoker, osteopenia is here for follow up as well as here to make sure she has enough refill of med. HTN: BP has been well controled with HCTZ 12.5 mg and   Irbesartan 300 mg.      HLD: Takes Lipitor 40 mg daily. No side effects. Well controlled.      Non seasonal allergy: Well controlled with Singulair.      Denies any chest pain, soa, cough, abdominal pain. Lab Results   Component Value Date/Time    Cholesterol, total 162 05/03/2021 11:28 AM    HDL Cholesterol 58 05/03/2021 11:28 AM    LDL, calculated 47.6 05/03/2021 11:28 AM    VLDL, calculated 56.4 05/03/2021 11:28 AM    Triglyceride 282 (H) 05/03/2021 11:28 AM    CHOL/HDL Ratio 2.8 05/03/2021 11:28 AM            Pertinent items are noted in HPI.   Objective:     Vitals:    02/15/22 1029   BP: 118/72   Pulse: 85   Resp: 16   Temp: 97.8 °F (36.6 °C)   TempSrc: Temporal   SpO2: 100%   Weight: 137 lb (62.1 kg)   Height: 4' 11\" (1.499 m)       Physical Examination: General appearance - alert, well appearing, and in no distress, oriented to person, place, and time and overweight  Mental status - alert, oriented to person, place, and time, normal mood, behavior, speech, dress, motor activity, and thought processes  Chest - clear to auscultation, no wheezes, rales or rhonchi, symmetric air entry  Heart - normal rate, regular rhythm, normal S1, S2, no murmurs, rubs, clicks or gallops    Allergies   Allergen Reactions    Pollen Extracts Itching      Social History     Socioeconomic History    Marital status:    Tobacco Use    Smoking status: Current Every Day Smoker     Packs/day: 0.50     Years: 37.00     Pack years: 18.50     Types: Cigarettes    Smokeless tobacco: Never Used   Vaping Use    Vaping Use: Never used   Substance and Sexual Activity    Alcohol use: Yes     Comment: SOCIALLY    Drug use: No    Sexual activity: Not Currently     Partners: Male      Family History   Problem Relation Age of Onset    Breast Cancer Maternal Aunt     Dementia Mother     Hypertension Mother     High Cholesterol Mother     Diabetes Mother     Cancer Father         COLON    Hypertension Sister     Diabetes Sister     Hypertension Brother     Heart Disease Brother     Hypertension Sister     High Cholesterol Sister     Hypertension Brother     Anesth Problems Neg Hx       Past Surgical History:   Procedure Laterality Date    COLONOSCOPY N/A 11/17/2017    COLONOSCOPY performed by Charmayne Breslow, MD at [facility]. [address], DIAGNOSTIC  2007    due 2017    HX HIP REPLACEMENT Bilateral 2017 & 07/08/2020    HX HIP REPLACEMENT Bilateral     HX ORTHOPAEDIC      HX OVARIAN CYST REMOVAL  1974    NATALIE BIOPSY BREAST STEREOTACTIC Right 2010    benign    NEUROLOGICAL PROCEDURE UNLISTED  2020    TRIGGER FINGER, CYST REMOVAL, TENDON RELEASE      Past Medical History:   Diagnosis Date    Allergic asthma     SEASONAL AND DOG ALLERGIES    Arthritis     Cyst of left kidney 01/26/2020    Hypercholesterolemia 2001    Hypertension 2001    Non-seasonal allergic rhinitis 6-2011    dr Bebeto Bains   [facility]    Plantar fasciitis of left foot       Current Outpatient Medications   Medication Sig Dispense Refill    hydroCHLOROthiazide (MICROZIDE) 12.5 mg capsule TAKE 1 CAPSULE BY MOUTH EVERY DAY 90 Capsule 0    montelukast (Singulair) 10 mg tablet Take 1 Tablet by mouth daily. 90 Tablet 1    varicella-zoster recombinant, PF, (Shingrix, PF,) 50 mcg/0.5 mL susr injection 0.5mL by IntraMUSCular route once now and then repeat in 2-6 months 0.5 mL 1    irbesartan (AVAPRO) 300 mg tablet Take 1 Tablet by mouth daily. 90 Tablet 1    atorvastatin (LIPITOR) 40 mg tablet Take 1 Tablet by mouth nightly.  90 Tablet 1    albuterol (PROVENTIL HFA, VENTOLIN HFA, PROAIR HFA) 90 mcg/actuation inhaler Take 2 Puffs by inhalation every six (6) hours as needed for Wheezing or Cough. 1 g 1    cholecalciferol (Vitamin D3) 25 mcg (1,000 unit) cap Take 1 Capsule by mouth daily. 90 Capsule 1    indomethacin (INDOCIN) 25 mg capsule Take 25 mg by mouth three (3) times daily as needed. Assessment/ Plan:   Diagnoses and all orders for this visit:    1. Essential hypertension, benign  -    Well controlled with current med. -     irbesartan (AVAPRO) 300 mg tablet; Take 1 Tablet by mouth daily. -     hydroCHLOROthiazide (MICROZIDE) 12.5 mg capsule; Take 1 Capsule by mouth daily. 2. Non-seasonal allergic rhinitis, unspecified trigger  -     montelukast (Singulair) 10 mg tablet; Take 1 Tablet by mouth daily. 3. Hypercholesterolemia  -     atorvastatin (LIPITOR) 40 mg tablet; Take 1 Tablet by mouth nightly. Encouraged to quit smoking. Medication risks/benefits/costs/interactions/alternatives discussed with patient. Advised patient to call back or return to office if symptoms worsen/change/persist. If patient cannot reach us or should anything more severe/urgent arise he/she should proceed directly to the nearest emergency department. Discussed expected course/resolution/complications of diagnosis in detail with patient. Patient given a written after visit summary which includes her diagnoses, current medications and vitals. Patient expressed understanding with the diagnosis and plan. Follow-up and Dispositions    · Return in about 3 months (around 5/15/2022), or if symptoms worsen or fail to improve, for Hypertension, cholesterol.

## 2022-03-19 PROBLEM — M16.11 PRIMARY OSTEOARTHRITIS OF RIGHT HIP: Status: ACTIVE | Noted: 2017-03-15

## 2022-03-19 PROBLEM — N28.89 RENAL MASS, LEFT: Status: ACTIVE | Noted: 2020-08-25

## 2022-03-19 PROBLEM — M79.606 LEG PAIN: Status: ACTIVE | Noted: 2020-02-27

## 2022-03-19 PROBLEM — F17.200 CURRENT EVERY DAY SMOKER: Status: ACTIVE | Noted: 2021-04-01

## 2022-03-20 PROBLEM — M85.80 OSTEOPENIA: Status: ACTIVE | Noted: 2021-04-01

## 2022-03-20 PROBLEM — M16.12 OSTEOARTHRITIS OF LEFT HIP: Status: ACTIVE | Noted: 2020-07-08

## 2022-05-16 DIAGNOSIS — E78.00 HYPERCHOLESTEROLEMIA: ICD-10-CM

## 2022-05-17 RX ORDER — ATORVASTATIN CALCIUM 40 MG/1
40 TABLET, FILM COATED ORAL
Qty: 90 TABLET | Refills: 0 | Status: SHIPPED | OUTPATIENT
Start: 2022-05-17 | End: 2022-06-17 | Stop reason: SDUPTHER

## 2022-06-09 ENCOUNTER — OFFICE VISIT (OUTPATIENT)
Dept: ORTHOPEDIC SURGERY | Age: 67
End: 2022-06-09
Payer: MEDICARE

## 2022-06-09 VITALS — WEIGHT: 137 LBS | BODY MASS INDEX: 27.62 KG/M2 | HEIGHT: 59 IN

## 2022-06-09 DIAGNOSIS — Z96.643 STATUS POST BILATERAL HIP REPLACEMENTS: Primary | ICD-10-CM

## 2022-06-09 PROCEDURE — 1101F PT FALLS ASSESS-DOCD LE1/YR: CPT | Performed by: ORTHOPAEDIC SURGERY

## 2022-06-09 PROCEDURE — G9899 SCRN MAM PERF RSLTS DOC: HCPCS | Performed by: ORTHOPAEDIC SURGERY

## 2022-06-09 PROCEDURE — 99214 OFFICE O/P EST MOD 30 MIN: CPT | Performed by: ORTHOPAEDIC SURGERY

## 2022-06-09 PROCEDURE — G8432 DEP SCR NOT DOC, RNG: HCPCS | Performed by: ORTHOPAEDIC SURGERY

## 2022-06-09 PROCEDURE — 1123F ACP DISCUSS/DSCN MKR DOCD: CPT | Performed by: ORTHOPAEDIC SURGERY

## 2022-06-09 PROCEDURE — G8427 DOCREV CUR MEDS BY ELIG CLIN: HCPCS | Performed by: ORTHOPAEDIC SURGERY

## 2022-06-09 PROCEDURE — 1090F PRES/ABSN URINE INCON ASSESS: CPT | Performed by: ORTHOPAEDIC SURGERY

## 2022-06-09 PROCEDURE — 3017F COLORECTAL CA SCREEN DOC REV: CPT | Performed by: ORTHOPAEDIC SURGERY

## 2022-06-09 PROCEDURE — G8756 NO BP MEASURE DOC: HCPCS | Performed by: ORTHOPAEDIC SURGERY

## 2022-06-09 PROCEDURE — G8536 NO DOC ELDER MAL SCRN: HCPCS | Performed by: ORTHOPAEDIC SURGERY

## 2022-06-09 PROCEDURE — G8419 CALC BMI OUT NRM PARAM NOF/U: HCPCS | Performed by: ORTHOPAEDIC SURGERY

## 2022-06-09 RX ORDER — METHYLPREDNISOLONE 4 MG/1
TABLET ORAL
Qty: 1 DOSE PACK | Refills: 0 | Status: SHIPPED | OUTPATIENT
Start: 2022-06-09 | End: 2022-07-13 | Stop reason: ALTCHOICE

## 2022-06-09 NOTE — PROGRESS NOTES
Gabo Hassan (: 1955) is a 73 y.o. female patient, here for evaluation of the following chief complaint(s):  Surgical Follow-up (bilateral hip follow up)       ASSESSMENT/PLAN:  Below is the assessment and plan developed based on review of pertinent history, physical exam, labs, studies, and medications. 43-year-old female comes in today for follow-up. She is 2 years status post staged bilateral hip replacements. She says she has occasional groin pain on the left side. She does not walk with a limp and does all activities with no problem. Her exam is benign. Her x-rays are benign. Think she may have some psoas impingement. I am going to call in a Medrol Dosepak. Follow-up in 2 years      1. Status post bilateral hip replacements  -     XR PELV 1 OR 2 V; Future      Encounter Diagnosis   Name Primary?  Status post bilateral hip replacements Yes        No follow-ups on file. SUBJECTIVE/OBJECTIVE:  Gabo Hassan (: 1955) is a 73 y.o. female who presents today for the following:  Chief Complaint   Patient presents with    Surgical Follow-up     bilateral hip follow up       43-year-old female comes in today for follow-up. She is 2 years status post staged bilateral hip replacements. She says she has occasional groin pain on the left side. She does not walk with a limp and does all activities with no problem. IMAGING:  XR Results (most recent):  Results from Appointment encounter on 22    XR PELV 1 OR 2 V    Narrative  Radiographs reviewed including 2 views of the left hip. Status post hip arthroplasty. No evidence of aseptic loosening. Leg lengths and offset are appropriate. Allergies   Allergen Reactions    Pollen Extracts Itching       Current Outpatient Medications   Medication Sig    atorvastatin (LIPITOR) 40 mg tablet Take 1 Tablet by mouth nightly for 90 days.  irbesartan (AVAPRO) 300 mg tablet Take 1 Tablet by mouth daily.     montelukast (Singulair) 10 mg tablet Take 1 Tablet by mouth daily.  hydroCHLOROthiazide (MICROZIDE) 12.5 mg capsule Take 1 Capsule by mouth daily.  varicella-zoster recombinant, PF, (Shingrix, PF,) 50 mcg/0.5 mL susr injection 0.5mL by IntraMUSCular route once now and then repeat in 2-6 months    albuterol (PROVENTIL HFA, VENTOLIN HFA, PROAIR HFA) 90 mcg/actuation inhaler Take 2 Puffs by inhalation every six (6) hours as needed for Wheezing or Cough.  cholecalciferol (Vitamin D3) 25 mcg (1,000 unit) cap Take 1 Capsule by mouth daily.  indomethacin (INDOCIN) 25 mg capsule Take 25 mg by mouth three (3) times daily as needed. No current facility-administered medications for this visit.        Past Medical History:   Diagnosis Date    Allergic asthma     SEASONAL AND DOG ALLERGIES    Arthritis     Cyst of left kidney 01/26/2020    Hypercholesterolemia 2001    Hypertension 2001    Non-seasonal allergic rhinitis 6-2011    dr Elam Cough Osteoporosis 2006    Plantar fasciitis of left foot         Past Surgical History:   Procedure Laterality Date    COLONOSCOPY N/A 11/17/2017    [facility]COPY    ENDOSCOPY, COLON, DIAGNOSTIC  2007    due 2017    HX HIP REPLACEMENT Bilateral 2017 & 07/08/2020    HX HIP REPLACEMENT Bilateral     HX ORTHOPAEDIC      HX OVARIAN CYST REMOVAL  1974    NATALIE BIOPSY BREAST STEREOTACTIC Right 2010    benign    NEUROLOGICAL PROCEDURE UNLISTED  2020    TRIGGER FINGER, CYST REMOVAL, TENDON RELEASE       Family History   Problem Relation Age of Onset    Breast Cancer Maternal Aunt     Dementia Mother     Hypertension Mother     High Cholesterol Mother     Diabetes Mother     Cancer Father         COLON    Hypertension Sister     Diabetes Sister     Hypertension Brother     Heart Disease Brother     Hypertension Sister     High Cholesterol Sister     Hypertension Brother     Anesth Problems Neg Hx         Social History Tobacco Use    Smoking status: Current Every Day Smoker     Packs/day: 0.50     Years: 37.00     Pack years: 18.50     Types: Cigarettes    Smokeless tobacco: Never Used   Substance Use Topics    Alcohol use: Yes     Comment: SOCIALLY        All systems reviewed x 12 and were negative with the exception of None      No flowsheet data found. Vitals:  Ht 4' 11\" (1.499 m)   Wt 137 lb (62.1 kg)   BMI 27.67 kg/m²    Body mass index is 27.67 kg/m². Physical Exam    General: NAD    Cardiac: Extremities well perfused. Respiratory: Nonlabored breathing. LLE: Incision clean dry and intact with no erythema. Minimal numbness over the LFCN. Normal gait and station. Negative stinchfield. No pain with gentle internal and external rotation. .  Motor strength is grossly intact. Skin warm well perfused. Capillary refill <2 sec. An electronic signature was used to authenticate this note.   -- Faith Crouch MD

## 2022-06-17 ENCOUNTER — OFFICE VISIT (OUTPATIENT)
Dept: PRIMARY CARE CLINIC | Age: 67
End: 2022-06-17
Payer: MEDICARE

## 2022-06-17 VITALS
HEIGHT: 59 IN | TEMPERATURE: 97.5 F | BODY MASS INDEX: 26.81 KG/M2 | WEIGHT: 133 LBS | OXYGEN SATURATION: 100 % | HEART RATE: 86 BPM | SYSTOLIC BLOOD PRESSURE: 109 MMHG | DIASTOLIC BLOOD PRESSURE: 70 MMHG | RESPIRATION RATE: 16 BRPM

## 2022-06-17 DIAGNOSIS — R73.03 PREDIABETES: Primary | ICD-10-CM

## 2022-06-17 DIAGNOSIS — E78.00 HYPERCHOLESTEROLEMIA: ICD-10-CM

## 2022-06-17 DIAGNOSIS — J45.20 MILD INTERMITTENT EXTRINSIC ASTHMA WITHOUT COMPLICATION: ICD-10-CM

## 2022-06-17 DIAGNOSIS — J30.89 NON-SEASONAL ALLERGIC RHINITIS, UNSPECIFIED TRIGGER: ICD-10-CM

## 2022-06-17 DIAGNOSIS — I10 ESSENTIAL HYPERTENSION, BENIGN: ICD-10-CM

## 2022-06-17 PROCEDURE — 1090F PRES/ABSN URINE INCON ASSESS: CPT | Performed by: FAMILY MEDICINE

## 2022-06-17 PROCEDURE — 1123F ACP DISCUSS/DSCN MKR DOCD: CPT | Performed by: FAMILY MEDICINE

## 2022-06-17 PROCEDURE — G8427 DOCREV CUR MEDS BY ELIG CLIN: HCPCS | Performed by: FAMILY MEDICINE

## 2022-06-17 PROCEDURE — G9899 SCRN MAM PERF RSLTS DOC: HCPCS | Performed by: FAMILY MEDICINE

## 2022-06-17 PROCEDURE — 3017F COLORECTAL CA SCREEN DOC REV: CPT | Performed by: FAMILY MEDICINE

## 2022-06-17 PROCEDURE — G8752 SYS BP LESS 140: HCPCS | Performed by: FAMILY MEDICINE

## 2022-06-17 PROCEDURE — G8417 CALC BMI ABV UP PARAM F/U: HCPCS | Performed by: FAMILY MEDICINE

## 2022-06-17 PROCEDURE — G8536 NO DOC ELDER MAL SCRN: HCPCS | Performed by: FAMILY MEDICINE

## 2022-06-17 PROCEDURE — G8754 DIAS BP LESS 90: HCPCS | Performed by: FAMILY MEDICINE

## 2022-06-17 PROCEDURE — 99214 OFFICE O/P EST MOD 30 MIN: CPT | Performed by: FAMILY MEDICINE

## 2022-06-17 PROCEDURE — 1101F PT FALLS ASSESS-DOCD LE1/YR: CPT | Performed by: FAMILY MEDICINE

## 2022-06-17 PROCEDURE — G8510 SCR DEP NEG, NO PLAN REQD: HCPCS | Performed by: FAMILY MEDICINE

## 2022-06-17 RX ORDER — IRBESARTAN 300 MG/1
300 TABLET ORAL DAILY
Qty: 90 TABLET | Refills: 3 | Status: SHIPPED | OUTPATIENT
Start: 2022-06-17

## 2022-06-17 RX ORDER — HYDROCHLOROTHIAZIDE 12.5 MG/1
12.5 CAPSULE ORAL DAILY
Qty: 90 CAPSULE | Refills: 3 | Status: SHIPPED | OUTPATIENT
Start: 2022-06-17

## 2022-06-17 RX ORDER — ATORVASTATIN CALCIUM 40 MG/1
40 TABLET, FILM COATED ORAL
Qty: 90 TABLET | Refills: 3 | Status: SHIPPED | OUTPATIENT
Start: 2022-06-17

## 2022-06-17 RX ORDER — MONTELUKAST SODIUM 10 MG/1
10 TABLET ORAL DAILY
Qty: 90 TABLET | Refills: 3 | Status: SHIPPED | OUTPATIENT
Start: 2022-06-17

## 2022-06-17 RX ORDER — ALBUTEROL SULFATE 90 UG/1
2 AEROSOL, METERED RESPIRATORY (INHALATION)
Qty: 1 G | Refills: 11 | Status: SHIPPED | OUTPATIENT
Start: 2022-06-17

## 2022-06-17 NOTE — PROGRESS NOTES
HPI     Chief Complaint   Patient presents with    Hypertension     She is a 73 y.o. female who presents for med refills. Needs refills of her BP medications and labs today. Doing well on current regimen. No side effects. Review of Systems   Constitutional: Negative for chills and fever. HENT: Negative for sore throat. Respiratory: Negative for cough. Reviewed PmHx, RxHx, FmHx, SocHx, AllgHx and updated and dated in the chart. Physical Exam:  Visit Vitals  /70 (BP 1 Location: Right arm, BP Patient Position: Sitting, BP Cuff Size: Adult)   Pulse 86   Temp 97.5 °F (36.4 °C) (Temporal)   Resp 16   Ht 4' 11\" (1.499 m)   Wt 133 lb (60.3 kg)   SpO2 100%   BMI 26.86 kg/m²     Physical Exam  Vitals and nursing note reviewed. Constitutional:       General: She is not in acute distress. Appearance: Normal appearance. She is not ill-appearing. Cardiovascular:      Rate and Rhythm: Normal rate and regular rhythm. Heart sounds: No murmur heard. Pulmonary:      Effort: Pulmonary effort is normal. No respiratory distress. Breath sounds: Normal breath sounds. Neurological:      Mental Status: She is alert. Psychiatric:         Mood and Affect: Mood normal.         Behavior: Behavior normal.       No results found for this or any previous visit (from the past 12 hour(s)). Assessment / Plan     Diagnoses and all orders for this visit:    1. Prediabetes  -     HEMOGLOBIN A1C WITH EAG; Future    2. Mild intermittent extrinsic asthma without complication  -     albuterol (PROVENTIL HFA, VENTOLIN HFA, PROAIR HFA) 90 mcg/actuation inhaler; Take 2 Puffs by inhalation every six (6) hours as needed for Wheezing or Cough. 3. Hypercholesterolemia  -     atorvastatin (LIPITOR) 40 mg tablet; Take 1 Tablet by mouth nightly. -     LIPID PANEL; Future    4. Essential hypertension, benign  -     hydroCHLOROthiazide (MICROZIDE) 12.5 mg capsule; Take 1 Capsule by mouth daily.   - irbesartan (AVAPRO) 300 mg tablet; Take 1 Tablet by mouth daily.  -     METABOLIC PANEL, BASIC; Future    5. Non-seasonal allergic rhinitis, unspecified trigger  -     montelukast (Singulair) 10 mg tablet; Take 1 Tablet by mouth daily. I have discussed the diagnosis with the patient and the intended plan as seen in the above orders. The patient has received an after-visit summary and questions were answered concerning future plans. I have discussed medication side effects and warnings with the patient as well.     Najma Morales, DO

## 2022-06-17 NOTE — PROGRESS NOTES
Identified pt with two pt identifiers(name and ). Chief Complaint   Patient presents with    Hypertension        3 most recent PHQ Screens 2/15/2022   Little interest or pleasure in doing things Not at all   Feeling down, depressed, irritable, or hopeless Not at all   Total Score PHQ 2 0        Vitals:    22 1030   BP: 109/70   Pulse: 86   Resp: 16   Temp: 97.5 °F (36.4 °C)   TempSrc: Temporal   SpO2: 100%   Weight: 133 lb (60.3 kg)   Height: 4' 11\" (1.499 m)       Health Maintenance Due   Topic Date Due    DTaP/Tdap/Td series (1 - Tdap) Never done    Cervical cancer screen  2016    Lipid Screen  2022        1. Have you been to the ER, urgent care clinic since your last visit? Hospitalized since your last visit? No    2. Have you seen or consulted any other health care providers outside of the [address] [place] [address] since your last visit? Include any pap smears or colon screening. No    3. For patients aged 39-70: Has the patient had a colonoscopy / FIT/ Cologuard? No      If the patient is female:    4. For patients aged 41-77: Has the patient had a mammogram within the past 2 years? Yes - no Care Gap present      5. For patients aged 21-65: Has the patient had a pap smear? Yes - Care Gap present.  Rooming MA/LPN to request most recent results

## 2022-06-17 NOTE — PATIENT INSTRUCTIONS
Dr. Milton REBOLLEDO   Address: [address] [place] [address] [address], [place], [address]  Phone: [phone]

## 2022-07-13 ENCOUNTER — OFFICE VISIT (OUTPATIENT)
Dept: ORTHOPEDIC SURGERY | Age: 67
End: 2022-07-13
Payer: MEDICARE

## 2022-07-13 VITALS — WEIGHT: 133 LBS | HEIGHT: 59 IN | BODY MASS INDEX: 26.81 KG/M2

## 2022-07-13 DIAGNOSIS — L60.0 INGROWN TOENAIL OF LEFT FOOT WITH INFECTION: Primary | ICD-10-CM

## 2022-07-13 DIAGNOSIS — M79.675 GREAT TOE PAIN, LEFT: ICD-10-CM

## 2022-07-13 PROCEDURE — G8417 CALC BMI ABV UP PARAM F/U: HCPCS | Performed by: ORTHOPAEDIC SURGERY

## 2022-07-13 PROCEDURE — G8427 DOCREV CUR MEDS BY ELIG CLIN: HCPCS | Performed by: ORTHOPAEDIC SURGERY

## 2022-07-13 PROCEDURE — G8432 DEP SCR NOT DOC, RNG: HCPCS | Performed by: ORTHOPAEDIC SURGERY

## 2022-07-13 PROCEDURE — G8536 NO DOC ELDER MAL SCRN: HCPCS | Performed by: ORTHOPAEDIC SURGERY

## 2022-07-13 PROCEDURE — 99213 OFFICE O/P EST LOW 20 MIN: CPT | Performed by: ORTHOPAEDIC SURGERY

## 2022-07-13 PROCEDURE — 1090F PRES/ABSN URINE INCON ASSESS: CPT | Performed by: ORTHOPAEDIC SURGERY

## 2022-07-13 PROCEDURE — 3017F COLORECTAL CA SCREEN DOC REV: CPT | Performed by: ORTHOPAEDIC SURGERY

## 2022-07-13 PROCEDURE — G9899 SCRN MAM PERF RSLTS DOC: HCPCS | Performed by: ORTHOPAEDIC SURGERY

## 2022-07-13 PROCEDURE — G8756 NO BP MEASURE DOC: HCPCS | Performed by: ORTHOPAEDIC SURGERY

## 2022-07-13 PROCEDURE — 1123F ACP DISCUSS/DSCN MKR DOCD: CPT | Performed by: ORTHOPAEDIC SURGERY

## 2022-07-13 PROCEDURE — 1101F PT FALLS ASSESS-DOCD LE1/YR: CPT | Performed by: ORTHOPAEDIC SURGERY

## 2022-07-13 RX ORDER — CEPHALEXIN 500 MG/1
500 CAPSULE ORAL 4 TIMES DAILY
Qty: 12 CAPSULE | Refills: 3 | Status: SHIPPED | OUTPATIENT
Start: 2022-07-13 | End: 2022-08-11

## 2022-07-13 NOTE — LETTER
7/14/2022    Patient: Raine Rockwell   YOB: 1955   Date of Visit: 7/13/2022     Rina Echeverria [facility] [address]  [facility]    Dear Coral Ibarra DO,      Thank you for referring Ms. Solange Vang to [facility] for evaluation. My notes for this consultation are attached. If you have questions, please do not hesitate to call me. I look forward to following your patient along with you.       Sincerely,    Mary Lou Colón MD

## 2022-07-13 NOTE — PROGRESS NOTES
Andrew aDvison (: 1955) is a 73 y.o. female, patient,here for evaluation of the following   Chief Complaint   Patient presents with    Toe Pain     left big toe pain        ASSESSMENT/PLAN:  Below is the assessment and plan developed based on review of pertinent history, physical exam, labs, studies, and medications. 1. Ingrown toenail of left foot with infection  2. Great toe pain, left    Patient informed of findings, she does have ingrown toenail at the medial nail fold area of great toe left foot. She has mild cellulitis I recommended antibiotic treatment which is prescribed and sent to pharmacy today. She will continue to avoid pressure on the area, continue soaking the foot to allow this to continue to heal, I did put under the nail at the edge medial side Xeroform to keep the pressure off the skin and that will stay there for about 2 days and then she can take it down and continue with appropriate shoe wear. If any worsening problems, she can return to see me. Next time she returns we will also get left great toe x-rays 3 views if she continues to have the ingrown toenail problem. Return if symptoms worsen or fail to improve. Allergies   Allergen Reactions    Pollen Extracts Itching       Current Outpatient Medications   Medication Sig    cephALEXin (KEFLEX) 500 mg capsule Take 1 Capsule by mouth four (4) times daily. Indications: an infection of the skin and the tissue below the skin    albuterol (PROVENTIL HFA, VENTOLIN HFA, PROAIR HFA) 90 mcg/actuation inhaler Take 2 Puffs by inhalation every six (6) hours as needed for Wheezing or Cough.  atorvastatin (LIPITOR) 40 mg tablet Take 1 Tablet by mouth nightly.  hydroCHLOROthiazide (MICROZIDE) 12.5 mg capsule Take 1 Capsule by mouth daily.  irbesartan (AVAPRO) 300 mg tablet Take 1 Tablet by mouth daily.  montelukast (Singulair) 10 mg tablet Take 1 Tablet by mouth daily.     varicella-zoster recombinant, PF, (Shingrix, PF,) 50 mcg/0.5 mL susr injection 0.5mL by IntraMUSCular route once now and then repeat in 2-6 months    cholecalciferol (Vitamin D3) 25 mcg (1,000 unit) cap Take 1 Capsule by mouth daily.  indomethacin (INDOCIN) 25 mg capsule Take 25 mg by mouth three (3) times daily as needed. No current facility-administered medications for this visit.        Past Medical History:   Diagnosis Date    Allergic asthma     SEASONAL AND DOG ALLERGIES    Arthritis     Cyst of left kidney 01/26/2020    Hypercholesterolemia 2001    Hypertension 2001    Non-seasonal allergic rhinitis 6-2011    dr Lala Matters    Osteoporosis 2006    Plantar fasciitis of left foot        Past Surgical History:   Procedure Laterality Date    COLONOSCOPY N/A 11/17/2017    [facility] ENDOSCOPY    ENDOSCOPY, COLON, DIAGNOSTIC  2007    due 2017    HX HIP REPLACEMENT Bilateral 2017 & 07/08/2020    HX HIP REPLACEMENT Bilateral     HX ORTHOPAEDIC      HX OVARIAN CYST REMOVAL  1974    NATALIE BIOPSY BREAST STEREOTACTIC Right 2010    benign    NEUROLOGICAL PROCEDURE UNLISTED  2020    TRIGGER FINGER, CYST REMOVAL, TENDON RELEASE       Family History   Problem Relation Age of Onset    Breast Cancer Maternal Aunt     Dementia Mother     Hypertension Mother     High Cholesterol Mother     Diabetes Mother     Cancer Father         COLON    Hypertension Sister     Diabetes Sister     Hypertension Brother     Heart Disease Brother     Hypertension Sister     High Cholesterol Sister     Hypertension Brother     Anesth Problems Neg Hx        Social History     Socioeconomic History    Marital status:      Spouse name: Not on file    Number of children: Not on file    Years of education: Not on file    Highest education level: Not on file   Occupational History    Not on file   Tobacco Use    Smoking status: Current Every Day Smoker     Packs/day: 0.50     Years: 37.00     Pack years: 18.50 Types: Cigarettes    Smokeless tobacco: Never Used   Vaping Use    Vaping Use: Never used   Substance and Sexual Activity    Alcohol use: Yes     Comment: SOCIALLY    Drug use: No    Sexual activity: Not Currently     Partners: Male   Other Topics Concern    Not on file   Social History Narrative    Not on file     Social Determinants of Health     Financial Resource Strain:     Difficulty of Paying Living Expenses: Not on file   Food Insecurity:     Worried About Running Out of Food in the Last Year: Not on file    Destiny of Food in the Last Year: Not on file   Transportation Needs:     Lack of Transportation (Medical): Not on file    Lack of Transportation (Non-Medical): Not on file   Physical Activity: Sufficiently Active    Days of Exercise per Week: 4 days    Minutes of Exercise per Session: 150+ min   Stress:     Feeling of Stress : Not on file   Social Connections:     Frequency of Communication with Friends and Family: Not on file    Frequency of Social Gatherings with Friends and Family: Not on file    Attends Gnosticism Services: Not on file    Active Member of Clubs or Organizations: Not on file    Attends Club or Organization Meetings: Not on file    Marital Status: Not on file   Intimate Partner Violence: Not At Risk    Fear of Current or Ex-Partner: No    Emotionally Abused: No    Physically Abused: No    Sexually Abused: No   Housing Stability:     Unable to Pay for Housing in the Last Year: Not on file    Number of [place] in the Last Year: Not on file    Unstable Housing in the Last Year: Not on file           Vitals:  Ht 4' 11\" (1.499 m)   Wt 133 lb (60.3 kg)   BMI 26.86 kg/m²    Body mass index is 26.86 kg/m².     ROS     Positive for: Musculoskeletal    Last edited by James Saldaña on 2022 10:27 AM. (History)              SUBJECTIVE/OBJECTIVE:  Niharika Redd (: 1955)   New patient presents today with complaint of left great toe pain and states its painful to touch. This was worse prior to the pedicure staff cutting the nail much shorter so it would not hurt too much and cause significant pain. She had little bit of redness and that has improved some. However if she continues to have pain with pressure to the medial part of the great toe and nail. Denies fevers or chills. Physical Exam  Pleasant well-nourished female , alert and oriented to person, time and place, no acute distress. Normal gait, normal weightbearing stance. Left lower extremity/ankle: There is full active and passive range of motion intact, nontender, no swelling, normal exam.    Left foot: There is tenderness to palpation medial nail fold, there is mild erythema but no drainage, the edge of the nail is not impinging into the skin at this time. Rest of foot nontender. Contralateral foot and ankle exam, nontender, no swelling ligaments grossly stable. Normal weightbearing stance. Neurovascular exam intact for light touch sensation, cap refill, dorsalis pedis pulse palpable, flexion/extension strength 5/5. Skin intact without open wounds, lesions or ulcers, no skin discolorations, normal warmth to skin. Imaging:    No x-rays obtained today. An electronic signature was used to authenticate this note.   -- Luci Holder MD

## 2022-08-11 ENCOUNTER — OFFICE VISIT (OUTPATIENT)
Dept: URGENT CARE | Age: 67
End: 2022-08-11
Payer: MEDICARE

## 2022-08-11 VITALS
HEIGHT: 59 IN | RESPIRATION RATE: 16 BRPM | SYSTOLIC BLOOD PRESSURE: 136 MMHG | HEART RATE: 98 BPM | TEMPERATURE: 98.4 F | DIASTOLIC BLOOD PRESSURE: 79 MMHG | WEIGHT: 129 LBS | OXYGEN SATURATION: 99 % | BODY MASS INDEX: 26 KG/M2

## 2022-08-11 DIAGNOSIS — R10.13 DYSPEPSIA: ICD-10-CM

## 2022-08-11 DIAGNOSIS — R13.10 ODYNOPHAGIA: Primary | ICD-10-CM

## 2022-08-11 PROCEDURE — 1090F PRES/ABSN URINE INCON ASSESS: CPT | Performed by: NURSE PRACTITIONER

## 2022-08-11 PROCEDURE — 3017F COLORECTAL CA SCREEN DOC REV: CPT | Performed by: NURSE PRACTITIONER

## 2022-08-11 PROCEDURE — G9899 SCRN MAM PERF RSLTS DOC: HCPCS | Performed by: NURSE PRACTITIONER

## 2022-08-11 PROCEDURE — 1123F ACP DISCUSS/DSCN MKR DOCD: CPT | Performed by: NURSE PRACTITIONER

## 2022-08-11 PROCEDURE — 1101F PT FALLS ASSESS-DOCD LE1/YR: CPT | Performed by: NURSE PRACTITIONER

## 2022-08-11 PROCEDURE — G8752 SYS BP LESS 140: HCPCS | Performed by: NURSE PRACTITIONER

## 2022-08-11 PROCEDURE — G8754 DIAS BP LESS 90: HCPCS | Performed by: NURSE PRACTITIONER

## 2022-08-11 PROCEDURE — G8427 DOCREV CUR MEDS BY ELIG CLIN: HCPCS | Performed by: NURSE PRACTITIONER

## 2022-08-11 PROCEDURE — 99204 OFFICE O/P NEW MOD 45 MIN: CPT | Performed by: NURSE PRACTITIONER

## 2022-08-11 PROCEDURE — G8432 DEP SCR NOT DOC, RNG: HCPCS | Performed by: NURSE PRACTITIONER

## 2022-08-11 PROCEDURE — 93000 ELECTROCARDIOGRAM COMPLETE: CPT | Performed by: NURSE PRACTITIONER

## 2022-08-11 PROCEDURE — G8536 NO DOC ELDER MAL SCRN: HCPCS | Performed by: NURSE PRACTITIONER

## 2022-08-11 PROCEDURE — G8417 CALC BMI ABV UP PARAM F/U: HCPCS | Performed by: NURSE PRACTITIONER

## 2022-08-11 RX ORDER — FAMOTIDINE 20 MG/1
20 TABLET, FILM COATED ORAL 2 TIMES DAILY
Qty: 60 TABLET | Refills: 0 | Status: SHIPPED | OUTPATIENT
Start: 2022-08-11 | End: 2022-09-10

## 2022-08-11 NOTE — PROGRESS NOTES
Here for right chest pain  Onset 4 days ago without injury  Sharp non radiating  Prime exacerbation is when she drinks fluids especially water  It is never associated with dizziness, exertion, movement or arm pain  She feels perfectly well otherwise  She tried pepto OTC without much relief  There have been no cough, SOB, nausea, vomiting or diarrhea.          Past Medical History:   Diagnosis Date    Allergic asthma     SEASONAL AND DOG ALLERGIES    Arthritis     Cyst of left kidney 01/26/2020    Hypercholesterolemia 2001    Hypertension 2001    Non-seasonal allergic rhinitis 6-2011    dr Kvng Mathew    Osteoporosis 2006    Plantar fasciitis of left foot         Past Surgical History:   Procedure Laterality Date    COLONOSCOPY N/A 11/17/2017    COLONOSCOPY performed by Dirk Ken MD at [facility] ENDOSCOPY    ENDOSCOPY, COLON, DIAGNOSTIC  2007    due 2017    HX HIP REPLACEMENT Bilateral 2017 & 07/08/2020    HX HIP REPLACEMENT Bilateral     HX ORTHOPAEDIC      HX OVARIAN CYST REMOVAL  1974    NATALIE BIOPSY BREAST STEREOTACTIC Right 2010    benign    NEUROLOGICAL PROCEDURE UNLISTED  2020    TRIGGER FINGER, CYST REMOVAL, TENDON RELEASE         Family History   Problem Relation Age of Onset    Breast Cancer Maternal Aunt     Dementia Mother     Hypertension Mother     High Cholesterol Mother     Diabetes Mother     Cancer Father         COLON    Hypertension Sister     Diabetes Sister     Hypertension Brother     Heart Disease Brother     Hypertension Sister     High Cholesterol Sister     Hypertension Brother     Anesth Problems Neg Hx         Social History     Socioeconomic History    Marital status:      Spouse name: Not on file    Number of children: Not on file    Years of education: Not on file    Highest education level: Not on file   Occupational History    Not on file   Tobacco Use    Smoking status: Every Day     Packs/day: 0.50     Years: 37.00     Pack years: 18.50     Types: Cigarettes    Smokeless tobacco: Never   Vaping Use    Vaping Use: Never used   Substance and Sexual Activity    Alcohol use: Yes     Comment: SOCIALLY    Drug use: No    Sexual activity: Not Currently     Partners: Male   Other Topics Concern    Not on file   Social History Narrative    Not on file     Social Determinants of Health     Financial Resource Strain: Not on file   Food Insecurity: Not on file   Transportation Needs: Not on file   Physical Activity: Sufficiently Active    Days of Exercise per Week: 4 days    Minutes of Exercise per Session: 150+ min   Stress: Not on file   Social Connections: Not on file   Intimate Partner Violence: Not At Risk    Fear of Current or Ex-Partner: No    Emotionally Abused: No    Physically Abused: No    Sexually Abused: No   Housing Stability: Not on file                ALLERGIES: Pollen extracts    Review of Systems   Neurological:  Negative for dizziness and light-headedness. All other systems reviewed and are negative. Vitals:    08/11/22 1046   BP: 136/79   Pulse: 98   Resp: 16   Temp: 98.4 °F (36.9 °C)   SpO2: 99%   Weight: 129 lb (58.5 kg)   Height: 4' 11\" (1.499 m)       Physical Exam  Vitals reviewed. Constitutional:       Appearance: Normal appearance. She is not ill-appearing or diaphoretic. HENT:      Head: Normocephalic and atraumatic. Nose: No congestion or rhinorrhea. Mouth/Throat:      Mouth: Mucous membranes are moist.   Eyes:      Extraocular Movements: Extraocular movements intact. Cardiovascular:      Rate and Rhythm: Normal rate and regular rhythm. Heart sounds: No murmur heard. No friction rub. No gallop. Pulmonary:      Effort: Pulmonary effort is normal.      Breath sounds: Normal breath sounds. Abdominal:      General: Abdomen is flat. Palpations: Abdomen is soft. Tenderness: There is no abdominal tenderness. There is no guarding. Musculoskeletal:      Cervical back: Normal range of motion and neck supple. No tenderness. Lymphadenopathy:      Cervical: No cervical adenopathy. Skin:     Capillary Refill: Capillary refill takes less than 2 seconds. Coloration: Skin is not pale. Findings: No rash. Neurological:      Mental Status: She is alert and oriented to person, place, and time. Psychiatric:         Mood and Affect: Mood normal.         Behavior: Behavior normal.         Thought Content: Thought content normal.       MDM     Differential Diagnosis; Clinical Impression; Plan:       CLINICAL IMPRESSION:  (R13.10) Odynophagia  (primary encounter diagnosis)  (R10.13) Dyspepsia    Orders Placed This Encounter      famotidine (PEPCID) 20 mg tablet          Sig: Take 1 Tablet by mouth two (2) times a day for 30 days. Dispense:  60 Tablet          Refill:  0      Plan:  Right sided chest pain with swallowing liquids primarily  Low suspicion of this being cardiac etiology. Additionally she has a normal EKG today without any evidence of ischemia  Lungs are clear and this does not seem pulmonary related. To start pepcid for odynophagia  Ddx GERD, peptic ulcer      We have reviewed concerning signs/symptoms, normal vs abnormal progression of medical condition and when to seek immediate medical attention. Schedule with PCP or Urgent Care immediately for worsening or new symptoms. Adivised PCP follow up within 2 weeks; she is to call their office today. You should see your PCP for updates on your routine health maintenance.              Procedures

## 2022-08-11 NOTE — PATIENT INSTRUCTIONS
Please follow up with PCP within 2 weeks  Go to the emergency department for any new, worsening or changes

## 2022-08-15 ENCOUNTER — HOSPITAL ENCOUNTER (OUTPATIENT)
Dept: GENERAL RADIOLOGY | Age: 67
Discharge: HOME | End: 2022-08-15
Attending: FAMILY MEDICINE
Payer: MEDICARE

## 2022-08-15 ENCOUNTER — OFFICE VISIT (OUTPATIENT)
Dept: PRIMARY CARE CLINIC | Age: 67
End: 2022-08-15
Payer: MEDICARE

## 2022-08-15 VITALS
WEIGHT: 131.6 LBS | DIASTOLIC BLOOD PRESSURE: 72 MMHG | HEART RATE: 80 BPM | SYSTOLIC BLOOD PRESSURE: 122 MMHG | BODY MASS INDEX: 26.53 KG/M2 | RESPIRATION RATE: 18 BRPM | HEIGHT: 59 IN | TEMPERATURE: 97.5 F | OXYGEN SATURATION: 99 %

## 2022-08-15 DIAGNOSIS — R07.9 CHEST PAIN, UNSPECIFIED TYPE: Primary | ICD-10-CM

## 2022-08-15 DIAGNOSIS — R07.9 CHEST PAIN, UNSPECIFIED TYPE: ICD-10-CM

## 2022-08-15 DIAGNOSIS — K21.9 GASTROESOPHAGEAL REFLUX DISEASE, UNSPECIFIED WHETHER ESOPHAGITIS PRESENT: ICD-10-CM

## 2022-08-15 PROCEDURE — G8427 DOCREV CUR MEDS BY ELIG CLIN: HCPCS | Performed by: FAMILY MEDICINE

## 2022-08-15 PROCEDURE — 1090F PRES/ABSN URINE INCON ASSESS: CPT | Performed by: FAMILY MEDICINE

## 2022-08-15 PROCEDURE — G8752 SYS BP LESS 140: HCPCS | Performed by: FAMILY MEDICINE

## 2022-08-15 PROCEDURE — 1101F PT FALLS ASSESS-DOCD LE1/YR: CPT | Performed by: FAMILY MEDICINE

## 2022-08-15 PROCEDURE — G8754 DIAS BP LESS 90: HCPCS | Performed by: FAMILY MEDICINE

## 2022-08-15 PROCEDURE — G8536 NO DOC ELDER MAL SCRN: HCPCS | Performed by: FAMILY MEDICINE

## 2022-08-15 PROCEDURE — G9899 SCRN MAM PERF RSLTS DOC: HCPCS | Performed by: FAMILY MEDICINE

## 2022-08-15 PROCEDURE — 99213 OFFICE O/P EST LOW 20 MIN: CPT | Performed by: FAMILY MEDICINE

## 2022-08-15 PROCEDURE — G8510 SCR DEP NEG, NO PLAN REQD: HCPCS | Performed by: FAMILY MEDICINE

## 2022-08-15 PROCEDURE — 3017F COLORECTAL CA SCREEN DOC REV: CPT | Performed by: FAMILY MEDICINE

## 2022-08-15 PROCEDURE — 71046 X-RAY EXAM CHEST 2 VIEWS: CPT

## 2022-08-15 PROCEDURE — G8417 CALC BMI ABV UP PARAM F/U: HCPCS | Performed by: FAMILY MEDICINE

## 2022-08-15 PROCEDURE — 1123F ACP DISCUSS/DSCN MKR DOCD: CPT | Performed by: FAMILY MEDICINE

## 2022-08-15 NOTE — PROGRESS NOTES
HPI     Chief Complaint   Patient presents with    Chest Pain     Was seen at urgent care - pain with swallowing and breathing hard - did have EKG done . Possible acid reflux  - Wants xray ,      She is a 73 y.o. female who presents for chest pain. Seen in urgent care 8/11 for chest pain that started 8/7. Pain was worse with swallowing/ drinking fluids. Was told EKG was okay and heart looked good. Suspected reflux and was started on Pepcid. Was told to follow up with PCP for CXR as their imaging was down. She is a current smoker. Has not seen a heart doctor. States Pepcid has helped a lot and has only occurred a few times since starting the medication. Review of Systems   Respiratory:  Negative for cough and shortness of breath. Cardiovascular:  Positive for chest pain. Negative for palpitations. Gastrointestinal:  Negative for abdominal pain and blood in stool. Reviewed PmHx, RxHx, FmHx, SocHx, AllgHx and updated and dated in the chart. Physical Exam:  Visit Vitals  /72 (BP 1 Location: Right arm, BP Patient Position: Sitting, BP Cuff Size: Adult)   Pulse 80   Temp 97.5 °F (36.4 °C) (Temporal)   Resp 18   Ht 4' 11\" (1.499 m)   Wt 131 lb 9.6 oz (59.7 kg)   SpO2 99%   BMI 26.58 kg/m²     Physical Exam  Vitals and nursing note reviewed. Constitutional:       General: She is not in acute distress. Appearance: Normal appearance. She is not ill-appearing. Cardiovascular:      Rate and Rhythm: Normal rate and regular rhythm. Heart sounds: No murmur heard. Pulmonary:      Effort: Pulmonary effort is normal. No respiratory distress. Breath sounds: Normal breath sounds. Neurological:      General: No focal deficit present. Mental Status: She is alert. Psychiatric:         Mood and Affect: Mood normal.         Behavior: Behavior normal.     No results found for this or any previous visit (from the past 12 hour(s)).        Assessment / Plan     Diagnoses and all orders for this visit:    1. Chest pain, unspecified type  -     XR CHEST PA LAT; Future    2. Gastroesophageal reflux disease, unspecified whether esophagitis present     Will get CXR. We did discuss referral to Cards but patient states they do not see need since EKG's are always okay. Discussed that EKG can not guarantee she does not have heart disease or heart issues and she does have risk factors. States she would be open to referral to Cards if her pain continues to persist/ does not improve and will call us back if she desires to do this. I have discussed the diagnosis with the patient and the intended plan as seen in the above orders. The patient has received an after-visit summary and questions were answered concerning future plans. I have discussed medication side effects and warnings with the patient as well.     Martha Rodas, DO

## 2022-08-15 NOTE — PROGRESS NOTES
Identified pt with two pt identifiers(name and ). Chief Complaint   Patient presents with    Chest Pain     Was seen at urgent care - pain with swallowing and breathing hard - did have EKG done . Possible acid reflux  - Wants xray ,         3 most recent PHQ Screens 8/15/2022   Little interest or pleasure in doing things Not at all   Feeling down, depressed, irritable, or hopeless Not at all   Total Score PHQ 2 0        Vitals:    08/15/22 0949   BP: 122/72   Pulse: 80   Resp: 18   Temp: 97.5 °F (36.4 °C)   TempSrc: Temporal   SpO2: 99%   Weight: 131 lb 9.6 oz (59.7 kg)   Height: 4' 11\" (1.499 m)       Health Maintenance Due   Topic Date Due    DTaP/Tdap/Td series (1 - Tdap) Never done    COVID-19 Vaccine (4 - Booster for Moderna series) 2022        1. Have you been to the ER, urgent care clinic since your last visit? Hospitalized since your last visit? Yes - urgent care     2. Have you seen or consulted any other health care providers outside of the [address] [place] [address] since your last visit? Include any pap smears or colon screening. No    3. For patients aged 39-70: Has the patient had a colonoscopy / FIT/ Cologuard? Yes - no Care Gap present      If the patient is female:    4. For patients aged 41-77: Has the patient had a mammogram within the past 2 years? Yes - no Care Gap present      5. For patients aged 21-65: Has the patient had a pap smear?  Yes - no Care Gap present

## 2022-09-06 ENCOUNTER — OFFICE VISIT (OUTPATIENT)
Dept: SURGERY | Age: 67
End: 2022-09-06
Payer: MEDICARE

## 2022-09-06 VITALS
WEIGHT: 130.2 LBS | BODY MASS INDEX: 26.25 KG/M2 | TEMPERATURE: 98.2 F | HEIGHT: 59 IN | DIASTOLIC BLOOD PRESSURE: 75 MMHG | SYSTOLIC BLOOD PRESSURE: 115 MMHG

## 2022-09-06 DIAGNOSIS — L72.3 SEBACEOUS CYST: Primary | ICD-10-CM

## 2022-09-06 PROCEDURE — 1123F ACP DISCUSS/DSCN MKR DOCD: CPT | Performed by: SURGERY

## 2022-09-06 PROCEDURE — 1090F PRES/ABSN URINE INCON ASSESS: CPT | Performed by: SURGERY

## 2022-09-06 PROCEDURE — G8752 SYS BP LESS 140: HCPCS | Performed by: SURGERY

## 2022-09-06 PROCEDURE — G8754 DIAS BP LESS 90: HCPCS | Performed by: SURGERY

## 2022-09-06 PROCEDURE — G8510 SCR DEP NEG, NO PLAN REQD: HCPCS | Performed by: SURGERY

## 2022-09-06 PROCEDURE — G8427 DOCREV CUR MEDS BY ELIG CLIN: HCPCS | Performed by: SURGERY

## 2022-09-06 PROCEDURE — 99212 OFFICE O/P EST SF 10 MIN: CPT | Performed by: SURGERY

## 2022-09-06 PROCEDURE — G8417 CALC BMI ABV UP PARAM F/U: HCPCS | Performed by: SURGERY

## 2022-09-06 PROCEDURE — 1101F PT FALLS ASSESS-DOCD LE1/YR: CPT | Performed by: SURGERY

## 2022-09-06 PROCEDURE — 3017F COLORECTAL CA SCREEN DOC REV: CPT | Performed by: SURGERY

## 2022-09-06 PROCEDURE — G8536 NO DOC ELDER MAL SCRN: HCPCS | Performed by: SURGERY

## 2022-09-06 PROCEDURE — G9899 SCRN MAM PERF RSLTS DOC: HCPCS | Performed by: SURGERY

## 2022-09-06 NOTE — PROGRESS NOTES
Amarilis Castro is a 73 y.o. female who is referred by Dr. Colette Doss for further evaluation of a subcutaneous mass on her right buttock. Ms. Josep Garcias tells me that she has had a subcutaneous mass on her right buttock for some time now. The mass has become progressively larger and more bothersome to her. No associated drainage or bleeding. She has otherwise been in her usual state of health.      Past Medical History:   Diagnosis Date    Allergic asthma     SEASONAL AND DOG ALLERGIES    Arthritis     Cyst of left kidney 01/26/2020    Hypercholesterolemia 2001    Hypertension 2001    Non-seasonal allergic rhinitis 6-2011    dr [address]    Osteoporosis 2006    Plantar fasciitis of left foot     Sebaceous cyst 9/6/2022     Past Surgical History:   Procedure Laterality Date    COLONOSCOPY N/A 11/17/2017    COLONOSCOPY performed by Esther Rivera MD at [facility] ENDOSCOPY    ENDOSCOPY, COLON, DIAGNOSTIC  2007    due 2017    HX HIP REPLACEMENT Bilateral 2017 & 07/08/2020    HX HIP REPLACEMENT Bilateral     HX ORTHOPAEDIC      HX OVARIAN CYST REMOVAL  1974    NATALIE BIOPSY BREAST STEREOTACTIC Right 2010    benign    NEUROLOGICAL PROCEDURE UNLISTED  2020    TRIGGER FINGER, CYST REMOVAL, TENDON RELEASE     Family History   Problem Relation Age of Onset    Breast Cancer Maternal Aunt     Dementia Mother     Hypertension Mother     High Cholesterol Mother     Diabetes Mother     Cancer Father         COLON    Hypertension Sister     Diabetes Sister     Hypertension Brother     Heart Disease Brother     Hypertension Sister     High Cholesterol Sister     Hypertension Brother     Anesth Problems Neg Hx      Social History     Socioeconomic History    Marital status:    Tobacco Use    Smoking status: Every Day     Packs/day: 0.50     Years: 37.00     Pack years: 18.50     Types: Cigarettes    Smokeless tobacco: Never   Vaping Use    Vaping Use: Never used   Substance and Sexual Activity    Alcohol use: Yes     Comment: SOCIALLY Drug use: No    Sexual activity: Not Currently     Partners: Male     Social Determinants of Health     Physical Activity: Sufficiently Active    Days of Exercise per Week: 4 days    Minutes of Exercise per Session: 150+ min     Review of systems negative except as noted. Review of Systems   Constitutional:  Negative for chills and fever. Musculoskeletal:         Minimal discomfort at site of subcutaneous mass. Physical Exam  Vitals reviewed. Exam conducted with a chaperone present. Constitutional:       General: She is not in acute distress. Appearance: Normal appearance. She is normal weight. HENT:      Head: Normocephalic and atraumatic. Eyes:      General: No scleral icterus. Cardiovascular:      Rate and Rhythm: Normal rate and regular rhythm. Pulmonary:      Effort: Pulmonary effort is normal.      Breath sounds: Normal breath sounds. Abdominal:      General: There is no distension. Palpations: Abdomen is soft. Tenderness: There is no abdominal tenderness. Musculoskeletal:         General: Normal range of motion. Cervical back: Neck supple. Lymphadenopathy:      Cervical: No cervical adenopathy. Skin:            Comments: To the right of the intergluteal cleft, there is an approximately 1.5 cm x 1 cm, well circumscribed, freely movable subcutaneous mass. No active infection. Clinically, this is c/w a sebaceous cyst.   Neurological:      General: No focal deficit present. Mental Status: She is alert. ASSESSMENT and PLAN  Ms. Steph Mahan is a 72 yo female with a sebaceous cyst on her right buttock. In view of the findings on H and P, Ms. Steph Mahan should benefit from excision of the sebaceous cyst as it is bothersome to her. Discussed procedure with Ms. Steph Mahan including risks of bleeding, infection, need for further surgery, recurrence. She understands and wishes to proceed.  Ms. Steph Mahan will schedule excision of the sebaceous cyst in the office at her convenience.       CC: Amee Hannah DO

## 2022-09-06 NOTE — PROGRESS NOTES
1. Have you been to the ER, urgent care clinic since your last visit? Hospitalized since your last visit? No    2. Have you seen or consulted any other health care providers outside of the [address] [place] [address] since your last visit? Include any pap smears or colon screening.  No

## 2022-11-07 ENCOUNTER — OFFICE VISIT (OUTPATIENT)
Dept: PRIMARY CARE CLINIC | Age: 67
End: 2022-11-07
Payer: MEDICARE

## 2022-11-07 VITALS
WEIGHT: 134 LBS | OXYGEN SATURATION: 98 % | TEMPERATURE: 98.4 F | SYSTOLIC BLOOD PRESSURE: 123 MMHG | RESPIRATION RATE: 18 BRPM | HEART RATE: 78 BPM | HEIGHT: 59 IN | BODY MASS INDEX: 27.01 KG/M2 | DIASTOLIC BLOOD PRESSURE: 75 MMHG

## 2022-11-07 DIAGNOSIS — R11.0 NAUSEA: ICD-10-CM

## 2022-11-07 DIAGNOSIS — R10.10 UPPER ABDOMINAL PAIN: Primary | ICD-10-CM

## 2022-11-07 PROCEDURE — G8432 DEP SCR NOT DOC, RNG: HCPCS | Performed by: FAMILY MEDICINE

## 2022-11-07 PROCEDURE — 99214 OFFICE O/P EST MOD 30 MIN: CPT | Performed by: FAMILY MEDICINE

## 2022-11-07 PROCEDURE — G8752 SYS BP LESS 140: HCPCS | Performed by: FAMILY MEDICINE

## 2022-11-07 PROCEDURE — G8754 DIAS BP LESS 90: HCPCS | Performed by: FAMILY MEDICINE

## 2022-11-07 PROCEDURE — G9899 SCRN MAM PERF RSLTS DOC: HCPCS | Performed by: FAMILY MEDICINE

## 2022-11-07 PROCEDURE — 1101F PT FALLS ASSESS-DOCD LE1/YR: CPT | Performed by: FAMILY MEDICINE

## 2022-11-07 PROCEDURE — G8427 DOCREV CUR MEDS BY ELIG CLIN: HCPCS | Performed by: FAMILY MEDICINE

## 2022-11-07 PROCEDURE — 3074F SYST BP LT 130 MM HG: CPT | Performed by: FAMILY MEDICINE

## 2022-11-07 PROCEDURE — 3017F COLORECTAL CA SCREEN DOC REV: CPT | Performed by: FAMILY MEDICINE

## 2022-11-07 PROCEDURE — 3078F DIAST BP <80 MM HG: CPT | Performed by: FAMILY MEDICINE

## 2022-11-07 PROCEDURE — 1123F ACP DISCUSS/DSCN MKR DOCD: CPT | Performed by: FAMILY MEDICINE

## 2022-11-07 PROCEDURE — 1090F PRES/ABSN URINE INCON ASSESS: CPT | Performed by: FAMILY MEDICINE

## 2022-11-07 PROCEDURE — G8536 NO DOC ELDER MAL SCRN: HCPCS | Performed by: FAMILY MEDICINE

## 2022-11-07 PROCEDURE — G8417 CALC BMI ABV UP PARAM F/U: HCPCS | Performed by: FAMILY MEDICINE

## 2022-11-07 RX ORDER — PANTOPRAZOLE SODIUM 20 MG/1
20 TABLET, DELAYED RELEASE ORAL DAILY
Qty: 30 TABLET | Refills: 2 | Status: SHIPPED | OUTPATIENT
Start: 2022-11-07

## 2022-11-07 NOTE — PROGRESS NOTES
Identified pt with two pt identifiers(name and ). Reviewed record in preparation for visit and have obtained necessary documentation. All patient medications has been reviewed. Chief Complaint   Patient presents with    Follow-up     Patient stated she has abdominal pain onset 1 month ago  she would like to ct  scan of the abdominal area        3 most recent PHQ Screens 2022   Little interest or pleasure in doing things Not at all   Feeling down, depressed, irritable, or hopeless Not at all   Total Score PHQ 2 0     Abuse Screening Questionnaire 11/15/2021   Do you ever feel afraid of your partner? N   Are you in a relationship with someone who physically or mentally threatens you? N   Is it safe for you to go home? Y       Health Maintenance Due   Topic    DTaP/Tdap/Td series (1 - Tdap)    COVID-19 Vaccine (4 - Booster for Moderna series)    Medicare Yearly Exam      Health Maintenance Review: Patient reminded of \"due or due soon\" health maintenance. I have asked the patient to contact his/her primary care provider (PCP) for follow-up on his/her health maintenance. Vitals:    22 1027   BP: 123/75   Pulse: 78   Resp: 18   Temp: 98.4 °F (36.9 °C)   TempSrc: Oral   SpO2: 98%   Weight: 134 lb (60.8 kg)   Height: 4' 11\" (1.499 m)       Wt Readings from Last 3 Encounters:   22 134 lb (60.8 kg)   22 130 lb 3.2 oz (59.1 kg)   08/15/22 131 lb 9.6 oz (59.7 kg)     Temp Readings from Last 3 Encounters:   22 98.4 °F (36.9 °C) (Oral)   22 98.2 °F (36.8 °C) (Oral)   08/15/22 97.5 °F (36.4 °C) (Temporal)     BP Readings from Last 3 Encounters:   22 123/75   22 115/75   08/15/22 122/72     Pulse Readings from Last 3 Encounters:   22 78   08/15/22 80   22 98       1. \"Have you been to the ER, urgent care clinic since your last visit? Hospitalized since your last visit? \" No    2.  \"Have you seen or consulted any other health care providers outside of the [facility] since your last visit? \" No     3. For patients aged 39-70: Has the patient had a colonoscopy / FIT/ Cologuard? No      If the patient is female:    4. For patients aged 41-77: Has the patient had a mammogram within the past 2 years? Yes - no Care Gap present      5. For patients aged 21-65: Has the patient had a pap smear?  Yes - no Care Gap present

## 2022-11-07 NOTE — PROGRESS NOTES
HPI     Chief Complaint   Patient presents with    Follow-up     Patient stated she has abdominal pain onset 1 month ago  she would like to ct  scan of the abdominal area      She is a 78 y.o. female who presents for abdominal pain. States she has been having abdominal pain x 1 month. Off and on. States it is epigastric in nature. She denies burning sensation. Denies radiation. Cannot reproduce pain by pushing on the area. Occurs mainly in the morning. States she still has gallbladder. Normal BMs, no blood in stool. No dysuria/ hematuria. Sometimes feels nauseated but no vomiting. Not worse after eating. Occasional ETOH use on the weekends (2-3 drinks a weekend). Has not tried anything for the pain. She does not use NSAIDs regularly. States she has been under a lot of stress due to family issues. She does get CT's every 9 month for hx of kidney cancer. Last was 9/14/22 with Dr. Duglas Mckeon at [facility] but states this was not occurring at the time. She has a follow up for a CT scan in December next. Review of Systems   Constitutional:  Negative for chills and fever. Gastrointestinal:  Positive for abdominal pain and nausea. Negative for blood in stool, constipation, diarrhea and vomiting. Reviewed PmHx, RxHx, FmHx, SocHx, AllgHx and updated and dated in the chart. Physical Exam:  Visit Vitals  /75 (BP 1 Location: Left upper arm, BP Patient Position: Sitting, BP Cuff Size: Adult)   Pulse 78   Temp 98.4 °F (36.9 °C) (Oral)   Resp 18   Ht 4' 11\" (1.499 m)   Wt 134 lb (60.8 kg)   SpO2 98%   BMI 27.06 kg/m²     Physical Exam  Vitals and nursing note reviewed. Constitutional:       General: She is not in acute distress. Appearance: Normal appearance. She is not ill-appearing. Cardiovascular:      Rate and Rhythm: Normal rate and regular rhythm. Heart sounds: No murmur heard. Pulmonary:      Effort: Pulmonary effort is normal. No respiratory distress.       Breath sounds: Normal breath sounds. Abdominal:      General: Bowel sounds are normal. There is no distension. Palpations: Abdomen is soft. Tenderness: There is no abdominal tenderness. There is no guarding or rebound. Neurological:      General: No focal deficit present. Mental Status: She is alert. Psychiatric:         Mood and Affect: Mood normal.         Behavior: Behavior normal.     No results found for this or any previous visit (from the past 12 hour(s)). Assessment / Plan     Diagnoses and all orders for this visit:    1. Upper abdominal pain  -     CBC WITH AUTOMATED DIFF; Future  -     METABOLIC PANEL, COMPREHENSIVE; Future  -     LIPASE; Future  -     URINALYSIS W/ REFLEX CULTURE; Future  -     US ABD LTD; Future  -     pantoprazole (PROTONIX) 20 mg tablet; Take 1 Tablet by mouth daily. 2. Nausea  -     US ABD LTD; Future     Reccomend to avoid spicy/ acidic foods. Start PPI. Check abd US and labs. Discussed ER precautions. If persists may need further imaging/ referral to ER. Will try to get CT scan from Dr. Khushi Quinones office. Release signed. I have discussed the diagnosis with the patient and the intended plan as seen in the above orders. The patient has received an after-visit summary and questions were answered concerning future plans. I have discussed medication side effects and warnings with the patient as well.     Nate Estrada, DO

## 2022-11-08 ENCOUNTER — TELEPHONE (OUTPATIENT)
Dept: PRIMARY CARE CLINIC | Age: 67
End: 2022-11-08

## 2022-11-08 NOTE — TELEPHONE ENCOUNTER
Clinical staff reached out for a fax number to another of the patient's providers. Fax: [phone]    No other comments or questions at this time.

## 2022-11-14 ENCOUNTER — HOSPITAL ENCOUNTER (OUTPATIENT)
Dept: ULTRASOUND IMAGING | Age: 67
Discharge: HOME | End: 2022-11-14
Attending: FAMILY MEDICINE
Payer: MEDICARE

## 2022-11-14 DIAGNOSIS — R10.10 UPPER ABDOMINAL PAIN: ICD-10-CM

## 2022-11-14 DIAGNOSIS — R11.0 NAUSEA: ICD-10-CM

## 2022-11-14 PROCEDURE — 76705 ECHO EXAM OF ABDOMEN: CPT

## 2022-11-14 RX ORDER — ATORVASTATIN CALCIUM 40 MG/1
40 TABLET, FILM COATED ORAL
COMMUNITY

## 2022-11-22 ENCOUNTER — HOSPITAL ENCOUNTER (OUTPATIENT)
Age: 67
Setting detail: OUTPATIENT SURGERY
Discharge: HOME | End: 2022-11-22
Attending: INTERNAL MEDICINE | Admitting: INTERNAL MEDICINE
Payer: MEDICARE

## 2022-11-22 ENCOUNTER — ANESTHESIA EVENT (OUTPATIENT)
Dept: ENDOSCOPY | Age: 67
End: 2022-11-22
Payer: MEDICARE

## 2022-11-22 ENCOUNTER — ANESTHESIA (OUTPATIENT)
Dept: ENDOSCOPY | Age: 67
End: 2022-11-22
Payer: MEDICARE

## 2022-11-22 VITALS
RESPIRATION RATE: 21 BRPM | HEART RATE: 102 BPM | SYSTOLIC BLOOD PRESSURE: 138 MMHG | HEIGHT: 59 IN | TEMPERATURE: 97.8 F | DIASTOLIC BLOOD PRESSURE: 76 MMHG | BODY MASS INDEX: 26.61 KG/M2 | OXYGEN SATURATION: 99 % | WEIGHT: 132 LBS

## 2022-11-22 PROCEDURE — 88305 TISSUE EXAM BY PATHOLOGIST: CPT

## 2022-11-22 PROCEDURE — 74011000250 HC RX REV CODE- 250: Performed by: NURSE ANESTHETIST, CERTIFIED REGISTERED

## 2022-11-22 PROCEDURE — 74011250636 HC RX REV CODE- 250/636: Performed by: NURSE ANESTHETIST, CERTIFIED REGISTERED

## 2022-11-22 PROCEDURE — 2709999900 HC NON-CHARGEABLE SUPPLY: Performed by: INTERNAL MEDICINE

## 2022-11-22 PROCEDURE — 76060000031 HC ANESTHESIA FIRST 0.5 HR: Performed by: INTERNAL MEDICINE

## 2022-11-22 PROCEDURE — 76040000019: Performed by: INTERNAL MEDICINE

## 2022-11-22 PROCEDURE — 77030029384 HC SNR POLYP CAPTVR II BSC -B: Performed by: INTERNAL MEDICINE

## 2022-11-22 RX ORDER — PROPOFOL 10 MG/ML
INJECTION, EMULSION INTRAVENOUS AS NEEDED
Status: DISCONTINUED | OUTPATIENT
Start: 2022-11-22 | End: 2022-11-22 | Stop reason: HOSPADM

## 2022-11-22 RX ORDER — MIDAZOLAM HYDROCHLORIDE 1 MG/ML
.25-5 INJECTION, SOLUTION INTRAMUSCULAR; INTRAVENOUS
Status: DISCONTINUED | OUTPATIENT
Start: 2022-11-22 | End: 2022-11-22 | Stop reason: HOSPADM

## 2022-11-22 RX ORDER — EPINEPHRINE 0.1 MG/ML
1 INJECTION INTRACARDIAC; INTRAVENOUS
Status: DISCONTINUED | OUTPATIENT
Start: 2022-11-22 | End: 2022-11-22 | Stop reason: HOSPADM

## 2022-11-22 RX ORDER — SODIUM CHLORIDE 0.9 % (FLUSH) 0.9 %
5-40 SYRINGE (ML) INJECTION AS NEEDED
Status: DISCONTINUED | OUTPATIENT
Start: 2022-11-22 | End: 2022-11-22 | Stop reason: HOSPADM

## 2022-11-22 RX ORDER — SODIUM CHLORIDE 9 MG/ML
150 INJECTION, SOLUTION INTRAVENOUS CONTINUOUS
Status: DISCONTINUED | OUTPATIENT
Start: 2022-11-22 | End: 2022-11-22 | Stop reason: HOSPADM

## 2022-11-22 RX ORDER — SODIUM CHLORIDE 0.9 % (FLUSH) 0.9 %
5-40 SYRINGE (ML) INJECTION EVERY 8 HOURS
Status: DISCONTINUED | OUTPATIENT
Start: 2022-11-22 | End: 2022-11-22 | Stop reason: HOSPADM

## 2022-11-22 RX ORDER — DEXTROMETHORPHAN/PSEUDOEPHED 2.5-7.5/.8
1.2 DROPS ORAL
Status: DISCONTINUED | OUTPATIENT
Start: 2022-11-22 | End: 2022-11-22 | Stop reason: HOSPADM

## 2022-11-22 RX ORDER — SODIUM CHLORIDE 9 MG/ML
INJECTION, SOLUTION INTRAVENOUS
Status: DISCONTINUED | OUTPATIENT
Start: 2022-11-22 | End: 2022-11-22 | Stop reason: HOSPADM

## 2022-11-22 RX ORDER — FENTANYL CITRATE 50 UG/ML
200 INJECTION, SOLUTION INTRAMUSCULAR; INTRAVENOUS
Status: DISCONTINUED | OUTPATIENT
Start: 2022-11-22 | End: 2022-11-22 | Stop reason: HOSPADM

## 2022-11-22 RX ORDER — LIDOCAINE HYDROCHLORIDE 20 MG/ML
INJECTION, SOLUTION EPIDURAL; INFILTRATION; INTRACAUDAL; PERINEURAL AS NEEDED
Status: DISCONTINUED | OUTPATIENT
Start: 2022-11-22 | End: 2022-11-22 | Stop reason: HOSPADM

## 2022-11-22 RX ORDER — ATROPINE SULFATE 0.1 MG/ML
0.5 INJECTION INTRAVENOUS
Status: DISCONTINUED | OUTPATIENT
Start: 2022-11-22 | End: 2022-11-22 | Stop reason: HOSPADM

## 2022-11-22 RX ADMIN — LIDOCAINE HYDROCHLORIDE 40 MG: 20 INJECTION, SOLUTION EPIDURAL; INFILTRATION; INTRACAUDAL; PERINEURAL at 09:10

## 2022-11-22 RX ADMIN — PROPOFOL 50 MG: 10 INJECTION, EMULSION INTRAVENOUS at 09:20

## 2022-11-22 RX ADMIN — PROPOFOL 50 MG: 10 INJECTION, EMULSION INTRAVENOUS at 09:09

## 2022-11-22 RX ADMIN — PROPOFOL 50 MG: 10 INJECTION, EMULSION INTRAVENOUS at 09:14

## 2022-11-22 RX ADMIN — PROPOFOL 50 MG: 10 INJECTION, EMULSION INTRAVENOUS at 09:17

## 2022-11-22 RX ADMIN — PROPOFOL 50 MG: 10 INJECTION, EMULSION INTRAVENOUS at 09:11

## 2022-11-22 RX ADMIN — SODIUM CHLORIDE: 9 INJECTION, SOLUTION INTRAVENOUS at 09:01

## 2022-11-22 RX ADMIN — PROPOFOL 50 MG: 10 INJECTION, EMULSION INTRAVENOUS at 09:22

## 2022-11-22 NOTE — ANESTHESIA POSTPROCEDURE EVALUATION
Post-Anesthesia Evaluation and Assessment    Patient: Val Moss MRN: [id number]  SSN: [id number]    YOB: 1955  Age: 78 y.o. Sex: female      I have evaluated the patient and they are stable and ready for discharge from the PACU. Cardiovascular Function/Vital Signs  Visit Vitals  /76   Pulse (!) 102   Temp 36.6 °C (97.8 °F)   Resp 21   Ht 4' 11\" (1.499 m)   Wt 59.9 kg (132 lb)   SpO2 99%   Breastfeeding No   BMI 26.66 kg/m²       Patient is status post MAC anesthesia for Procedure(s):  COLONOSCOPY  ENDOSCOPIC POLYPECTOMY. Nausea/Vomiting: None    Postoperative hydration reviewed and adequate. Pain:  Pain Scale 1: Visual (11/22/22 0933)  Pain Intensity 1: 0 (11/22/22 0933)   Managed    Neurological Status: At baseline    Mental Status, Level of Consciousness: Alert and  oriented to person, place, and time    Pulmonary Status:   O2 Device: None (Room air) (11/22/22 0945)   Adequate oxygenation and airway patent    Complications related to anesthesia: None    Post-anesthesia assessment completed. No concerns    Signed By: Murali Jorgensen MD     November 22, 2022              Procedure(s):  COLONOSCOPY  ENDOSCOPIC POLYPECTOMY. MAC    <BSHSIANPOST>    INITIAL Post-op Vital signs:   Vitals Value Taken Time   /76 11/22/22 0945   Temp     Pulse 100 11/22/22 0957   Resp 15 11/22/22 0957   SpO2 98 % 11/22/22 0957   Vitals shown include unvalidated device data.

## 2022-11-22 NOTE — H&P
[address], [address] [place] [address]          Pre-procedure History and Physical       NAME:  Kayli Sanchez   :   1955   MRN:   [id number]     CHIEF COMPLAINT/HPI: fh of colon ca    PMH:  Past Medical History:   Diagnosis Date    Allergic asthma     SEASONAL AND DOG ALLERGIES    Arthritis     Cancer ([address]     kidney - surgery    Cyst of left kidney 2020    was cancer per pt    GERD (gastroesophageal reflux disease)     Hypercholesterolemia     Hypertension     Non-seasonal allergic rhinitis 2011    dr Melendez Speaker    Osteoporosis 2006    Plantar fasciitis of left foot     Sebaceous cyst 2022       PSH:  Past Surgical History:   Procedure Laterality Date    COLONOSCOPY N/A 2017    COLONOSCOPY performed by Kitty Chavis MD at [facility], [address]      due     HX HIP REPLACEMENT Bilateral  & 2020    HX HIP REPLACEMENT Bilateral     bilateral done once as of 22    HX ORTHOPAEDIC      HX OVARIAN CYST REMOVAL      HX UROLOGICAL      removed cancer from left kidney    NATALIE BIOPSY BREAST STEREOTACTIC Right 2010    benign    NEUROLOGICAL PROCEDURE UNLISTED      TRIGGER FINGER, CYST REMOVAL, TENDON RELEASE       Allergies: Allergies   Allergen Reactions    Pollen Extracts Itching       Home Medications:  Prior to Admission Medications   Prescriptions Last Dose Informant Patient Reported? Taking? albuterol (PROVENTIL HFA, VENTOLIN HFA, PROAIR HFA) 90 mcg/actuation inhaler Not Taking  No No   Sig: Take 2 Puffs by inhalation every six (6) hours as needed for Wheezing or Cough. Patient not taking: Reported on 2022   atorvastatin (LIPITOR) 40 mg tablet 2022  Yes Yes   Sig: Take 40 mg by mouth nightly. cholecalciferol (Vitamin D3) 25 mcg (1,000 unit) cap 2022  No Yes   Sig: Take 1 Capsule by mouth daily.    hydroCHLOROthiazide (MICROZIDE) 12.5 mg capsule 2022  No Yes   Sig: Take 1 Capsule by mouth daily. indomethacin (INDOCIN) 25 mg capsule 11/22/2022  Yes Yes   Sig: Take 25 mg by mouth three (3) times daily as needed. irbesartan (AVAPRO) 300 mg tablet 11/21/2022  No Yes   Sig: Take 1 Tablet by mouth daily. montelukast (Singulair) 10 mg tablet 11/22/2022  No Yes   Sig: Take 1 Tablet by mouth daily. pantoprazole (PROTONIX) 20 mg tablet Not Taking  No No   Sig: Take 1 Tablet by mouth daily.    Patient not taking: Reported on 11/14/2022      Facility-Administered Medications: None       Hospital Medications:  Current Facility-Administered Medications   Medication Dose Route Frequency    0.9% sodium chloride infusion  150 mL/hr IntraVENous CONTINUOUS    sodium chloride (NS) flush 5-40 mL  5-40 mL IntraVENous Q8H    sodium chloride (NS) flush 5-40 mL  5-40 mL IntraVENous PRN    midazolam (VERSED) injection 0.25-5 mg  0.25-5 mg IntraVENous Multiple    fentaNYL citrate (PF) injection 200 mcg  200 mcg IntraVENous Multiple    simethicone (MYLICON) 69MT/7.0TN oral drops 80 mg  1.2 mL Oral Multiple    atropine injection 0.5 mg  0.5 mg IntraVENous ONCE PRN    EPINEPHrine (ADRENALIN) 0.1 mg/mL syringe 1 mg  1 mg Endoscopically ONCE PRN     Facility-Administered Medications Ordered in Other Encounters   Medication Dose Route Frequency    0.9% sodium chloride infusion   IntraVENous CONTINUOUS       Family History:  Family History   Problem Relation Age of Onset    Dementia Mother     Hypertension Mother     High Cholesterol Mother     Diabetes Mother     Cancer Father         COLON    Hypertension Sister     Diabetes Sister     Dementia Sister     Hypertension Sister     High Cholesterol Sister     Hypertension Brother     Heart Disease Brother     Hypertension Brother     Breast Cancer Maternal Aunt     Anesth Problems Neg Hx        Social History:  Social History     Tobacco Use    Smoking status: Every Day     Packs/day: 0.50     Years: 37.00     Pack years: 18.50     Types: Cigarettes Smokeless tobacco: Never   Substance Use Topics    Alcohol use: Yes     Comment: SOCIALLY       The patient was counseled at length about the risks of dk Covid-19 in the felicia-operative and post-operative states including the recovery window of their procedure. The patient was made aware that dk Covid-19 after a surgical procedure may worsen their prognosis for recovering from the virus and lend to a higher morbidity and or mortality risk. The patient was given the options of postponing their procedure. All of the risks, benefits, and alternatives were discussed. The patient does  wish to proceed with the procedure. PHYSICAL EXAM PRIOR TO SEDATION:  General: Alert, in no acute distress    Lungs:            CTA bilaterally  Heart:  Normal S1, S2    Abdomen: Soft, Non distended, Non tender. Normoactive bowel sounds. Assessment:   Stable for sedation administration.   Date of last colonoscopy: 5 yrs, Polyps  No    Plan:     Endoscopic procedure with sedation     Signed By: Karli Nur MD     11/22/2022  9:07 AM

## 2022-11-22 NOTE — ANESTHESIA PREPROCEDURE EVALUATION
Anesthetic History   No history of anesthetic complications            Review of Systems / Medical History  Patient summary reviewed and pertinent labs reviewed    Pulmonary          Smoker  Asthma        Neuro/Psych   Within defined limits           Cardiovascular    Hypertension              Exercise tolerance: >4 METS     GI/Hepatic/Renal         Renal disease       Endo/Other        Arthritis     Other Findings              Physical Exam    Airway  Mallampati: II  TM Distance: 4 - 6 cm  Neck ROM: normal range of motion   Mouth opening: Normal     Cardiovascular  Regular rate and rhythm,  S1 and S2 normal,  no murmur, click, rub, or gallop  Rhythm: regular  Rate: normal         Dental    Dentition: Upper partial plate     Pulmonary  Breath sounds clear to auscultation               Abdominal  GI exam deferred       Other Findings            Anesthetic Plan    ASA: 2  Anesthesia type: MAC          Induction: Intravenous  Anesthetic plan and risks discussed with: Patient

## 2022-11-22 NOTE — PROCEDURES
[place] [id number] Rajendra Viera M.D.  [address]  [phone]               Colonoscopy Procedure Note    NAME: Satish Moya  :  1955  MRN:  [id number]    Indications:   Personal history of colon polyps (screening only)     : Iza Ortiz MD    Referring Provider:  Loren Ozuna DO    Staff: Endoscopy Tan Nails: Britany Castelan  Endoscopy RN-1: Yadiel Mckeon RN    Prosthetic devices, grafts, tissues, transplant, or devices implanted: none    Medicines:  MAC anesthesia      Procedure Details:  After informed consent was obtained with all risks and benefits of the procedure explained and preprocedure exam completed, the patient was placed in the left lateral decubitus position. Universal protocol for patient identification was performed and documented in the nursing notes. Throughout the procedure, the patient's blood pressure was monitored at least every five minutes; pulse, and oxygen saturations were monitored continuously. All vital signs were documented in the nursing notes. A digital rectal exam was performed and was normal.  The Olympus videocolonoscope  was inserted in the rectum and carefully advanced to the cecum, which was identified by the ileocecal valve and appendiceal orifice. The colonoscope was slowly withdrawn with careful evaluation between folds. Retroflexion in the rectum was performed; findings and interventions are described below. Procedure start time, extent reached time/cecum time, and procedure end time are documented in the nursing notes. The quality of preparation was adequate.        Findings:   2 sessile polyps with greatest diameter of 5 mm in the sigmoid colon s/p cold snare polypectomy  Moderate L sided diverticulosis    Interventions:    2 complete polypectomy were performed using cold snare  and the polyps were  retrieved    Specimens:   ID Type Source Tests Collected by Time Destination   1 : sigmoid colon polyps Preservative Sigmoid  Samaria Mendoza MD 11/22/2022 9655 Pathology       EBL:  None. Complications:   No immediate complications     Impression:  -See post-procedure diagnoses. Recommendations:   -Repeat colonoscopy in 5 years. If < 10 years, reason:  above average risk patient    Resume normal medication(s). Signed by:  Nohemi Salmon MD          11/22/2022  9:31 AM

## 2022-11-22 NOTE — DISCHARGE INSTRUCTIONS
Rufino Nieves [facility] 915 Venessa Martinez M.D.  174 [facility], [address] [place] [address]  [phone]          COLONOSCOPY Uus 6  [id number]  1955    DISCOMFORT:  Redness at IV site- apply warm compress to area; if redness or soreness persist- contact your physician  There may be a slight amount of blood passed from the rectum  Gaseous discomfort- walking, belching will help relieve any discomfort  You may not operate a vehicle for 12 hours  You may not engage in an occupation involving machinery or appliances for the  rest of today  You may not drink alcoholic beverages for at least 12 hours  Avoid making any critical decisions for at least 24 hours    DIET:   You may resume your normal diet, but some patients find that heavy or large  meals may lead to indigestion or vomiting. I suggest a light meal as first food  intake. I recommend a whole food, plant-based diet for your overall health. ACTIVITY:  You may resume your normal daily activities. It is recommended that you spend the remainder of the day resting - avoid any strenuous activity. CALL M.DEmerald IF ANY SIGN OF:   Increasing pain, nausea, vomiting  Abdominal distension (swelling)  Significant bleeding (oral or rectal)  Fever   Pain in chest area  Shortness of breath    Additional Instructions:   Call Dr. Venessa Martinez if any questions or problems at [phone]   You should receive the biopsy results by phone or mail within 3 weeks, if not, call  my office for the results      Should have a repeat colonoscopy in 5 years. Colonoscopy showed 2 polyps removed and diverticulosis. Learning About Coronavirus [phone])  Coronavirus [phone]): Overview  What is coronavirus (COVID-19)? The coronavirus disease (COVID-19) is caused by a virus. It is an illness that was first found in [place], [place], in December 2019. It has since spread worldwide. The virus can cause fever, cough, and trouble breathing.  In severe cases, it can cause pneumonia and make it hard to breathe without help. It can cause death. Coronaviruses are a large group of viruses. They cause the common cold. They also cause more serious illnesses like Middle East respiratory syndrome (MERS) and severe acute respiratory syndrome (SARS). COVID-19 is caused by a novel coronavirus. That means it's a new type that has not been seen in people before. This virus spreads person-to-person through droplets from coughing and sneezing. It can also spread when you are close to someone who is infected. And it can spread when you touch something that has the virus on it, such as a doorknob or a tabletop. What can you do to protect yourself from coronavirus (COVID-19)? The best way to protect yourself from getting sick is to: Avoid areas where there is an outbreak. Avoid contact with people who may be infected. Wash your hands often with soap or alcohol-based hand sanitizers. Avoid crowds and try to stay at least 6 feet away from other people. Wash your hands often, especially after you cough or sneeze. Use soap and water, and scrub for at least 20 seconds. If soap and water aren't available, use an alcohol-based hand . Avoid touching your mouth, nose, and eyes. What can you do to avoid spreading the virus to others? To help avoid spreading the virus to others:  Cover your mouth with a tissue when you cough or sneeze. Then throw the tissue in the trash. Use a disinfectant to clean things that you touch often. Stay home if you are sick or have been exposed to the virus. Don't go to school, work, or public areas. And don't use public transportation. If you are sick:  Leave your home only if you need to get medical care. But call the doctor's office first so they know you're coming. And wear a face mask, if you have one. If you have a face mask, wear it whenever you're around other people.  It can help stop the spread of the virus when you cough or sneeze. Clean and disinfect your home every day. Use household  and disinfectant wipes or sprays. Take special care to clean things that you grab with your hands. These include doorknobs, remote controls, phones, and handles on your refrigerator and microwave. And don't forget countertops, tabletops, bathrooms, and computer keyboards. When to call for help  Call 911 anytime you think you may need emergency care. For example, call if:  You have severe trouble breathing. (You can't talk at all.)  You have constant chest pain or pressure. You are severely dizzy or lightheaded. You are confused or can't think clearly. Your face and lips have a blue color. You pass out (lose consciousness) or are very hard to wake up. Call your doctor now if you develop symptoms such as:  Shortness of breath. Fever. Cough. If you need to get care, call ahead to the doctor's office for instructions before you go. Make sure you wear a face mask, if you have one, to prevent exposing other people to the virus. Where can you get the latest information? The following health organizations are tracking and studying this virus. Their websites contain the most up-to-date information. Ankita Rodríguezflo also learn what to do if you think you may have been exposed to the virus. U.S. Centers for Disease Control and Prevention (CDC): The CDC provides updated news about the disease and travel advice. The website also tells you how to prevent the spread of infection. www.cdc.gov  World Health Organization [facility]): WHO offers information about the virus outbreaks. WHO also has travel advice. www.who.int  Current as of: April 1, 2020               Content Version: 12.4  © 7669-1797 Healthwise, Incorporated.    Care instructions adapted under license by your healthcare professional. If you have questions about a medical condition or this instruction, always ask your healthcare professional. Shameka Pradhan disclaims any warranty or liability for your use of this information.

## 2023-01-02 NOTE — PROGRESS NOTES
"Brock Up" Yaya was seen and treated in our emergency department on 1/2/2023.  He may return to work on 01/05/2023.       If you have any questions or concerns, please don't hesitate to call.       RN    " Cherelle Wagner and transferred OOB to chair. Pt sitting comfortably with call bell in reach. Educated about using the IS and also to use the splint pillow.

## 2023-01-04 ENCOUNTER — TRANSCRIBE ORDER (OUTPATIENT)
Dept: SCHEDULING | Age: 68
End: 2023-01-04

## 2023-01-04 DIAGNOSIS — Z12.31 SCREENING MAMMOGRAM FOR HIGH-RISK PATIENT: Primary | ICD-10-CM

## 2023-01-04 DIAGNOSIS — R10.10 UPPER ABDOMINAL PAIN: ICD-10-CM

## 2023-01-05 RX ORDER — PANTOPRAZOLE SODIUM 20 MG/1
20 TABLET, DELAYED RELEASE ORAL DAILY
Qty: 90 TABLET | Refills: 1 | Status: SHIPPED | OUTPATIENT
Start: 2023-01-05

## 2023-01-05 NOTE — TELEPHONE ENCOUNTER
Requested Prescriptions     Pending Prescriptions Disp Refills    pantoprazole (PROTONIX) 20 mg tablet 30 Tablet 2     Sig: Take 1 Tablet by mouth daily.         Last Visit 11/07/2022  Last Refill 11/07/2022

## 2023-02-13 ENCOUNTER — HOSPITAL ENCOUNTER (OUTPATIENT)
Dept: MAMMOGRAPHY | Age: 68
Discharge: HOME | End: 2023-02-13
Attending: FAMILY MEDICINE
Payer: MEDICARE

## 2023-02-13 DIAGNOSIS — Z12.31 SCREENING MAMMOGRAM FOR HIGH-RISK PATIENT: ICD-10-CM

## 2023-02-13 PROCEDURE — 77063 BREAST TOMOSYNTHESIS BI: CPT

## 2023-04-12 ENCOUNTER — OFFICE VISIT (OUTPATIENT)
Dept: ORTHOPEDIC SURGERY | Age: 68
End: 2023-04-12

## 2023-04-12 VITALS — WEIGHT: 132 LBS | BODY MASS INDEX: 26.61 KG/M2 | HEIGHT: 59 IN

## 2023-04-12 DIAGNOSIS — M79.672 LEFT FOOT PAIN: ICD-10-CM

## 2023-04-12 DIAGNOSIS — M20.12 HALLUX INTERPHALANGEUS, ACQUIRED, LEFT: Primary | ICD-10-CM

## 2023-04-12 DIAGNOSIS — L84 CALLUS OF FOOT: ICD-10-CM

## 2023-05-03 ENCOUNTER — OFFICE VISIT (OUTPATIENT)
Dept: ORTHOPEDIC SURGERY | Age: 68
End: 2023-05-03

## 2023-05-03 VITALS — BODY MASS INDEX: 26.61 KG/M2 | HEIGHT: 59 IN | WEIGHT: 132 LBS

## 2023-05-03 DIAGNOSIS — L84 CALLUS OF FOOT: ICD-10-CM

## 2023-05-03 DIAGNOSIS — M77.50 BONE SPUR OF FOOT: Primary | ICD-10-CM

## 2023-05-03 DIAGNOSIS — M20.12 HALLUX INTERPHALANGEUS, ACQUIRED, LEFT: ICD-10-CM

## 2023-05-03 DIAGNOSIS — M79.672 LEFT FOOT PAIN: ICD-10-CM

## 2023-05-22 ENCOUNTER — HOSPITAL ENCOUNTER (OUTPATIENT)
Facility: HOSPITAL | Age: 68
Discharge: HOME | End: 2023-05-25
Payer: MEDICARE

## 2023-05-22 ENCOUNTER — OFFICE VISIT (OUTPATIENT)
Dept: PRIMARY CARE CLINIC | Facility: CLINIC | Age: 68
End: 2023-05-22
Payer: MEDICARE

## 2023-05-22 VITALS
HEART RATE: 80 BPM | BODY MASS INDEX: 25.2 KG/M2 | RESPIRATION RATE: 19 BRPM | WEIGHT: 125 LBS | DIASTOLIC BLOOD PRESSURE: 79 MMHG | TEMPERATURE: 97.4 F | SYSTOLIC BLOOD PRESSURE: 125 MMHG | OXYGEN SATURATION: 98 % | HEIGHT: 59 IN

## 2023-05-22 DIAGNOSIS — E78.5 HYPERLIPIDEMIA, UNSPECIFIED HYPERLIPIDEMIA TYPE: ICD-10-CM

## 2023-05-22 DIAGNOSIS — I10 PRIMARY HYPERTENSION: ICD-10-CM

## 2023-05-22 DIAGNOSIS — Z00.00 MEDICARE ANNUAL WELLNESS VISIT, SUBSEQUENT: Primary | ICD-10-CM

## 2023-05-22 DIAGNOSIS — R63.4 WEIGHT LOSS, UNINTENTIONAL: ICD-10-CM

## 2023-05-22 DIAGNOSIS — Z72.0 TOBACCO USE: ICD-10-CM

## 2023-05-22 DIAGNOSIS — R73.03 PREDIABETES: ICD-10-CM

## 2023-05-22 PROCEDURE — 1123F ACP DISCUSS/DSCN MKR DOCD: CPT | Performed by: FAMILY MEDICINE

## 2023-05-22 PROCEDURE — 4004F PT TOBACCO SCREEN RCVD TLK: CPT | Performed by: FAMILY MEDICINE

## 2023-05-22 PROCEDURE — 99213 OFFICE O/P EST LOW 20 MIN: CPT | Performed by: FAMILY MEDICINE

## 2023-05-22 PROCEDURE — 3078F DIAST BP <80 MM HG: CPT | Performed by: FAMILY MEDICINE

## 2023-05-22 PROCEDURE — G8419 CALC BMI OUT NRM PARAM NOF/U: HCPCS | Performed by: FAMILY MEDICINE

## 2023-05-22 PROCEDURE — 3074F SYST BP LT 130 MM HG: CPT | Performed by: FAMILY MEDICINE

## 2023-05-22 PROCEDURE — G8427 DOCREV CUR MEDS BY ELIG CLIN: HCPCS | Performed by: FAMILY MEDICINE

## 2023-05-22 PROCEDURE — 1090F PRES/ABSN URINE INCON ASSESS: CPT | Performed by: FAMILY MEDICINE

## 2023-05-22 PROCEDURE — G8399 PT W/DXA RESULTS DOCUMENT: HCPCS | Performed by: FAMILY MEDICINE

## 2023-05-22 PROCEDURE — 71046 X-RAY EXAM CHEST 2 VIEWS: CPT

## 2023-05-22 PROCEDURE — 3017F COLORECTAL CA SCREEN DOC REV: CPT | Performed by: FAMILY MEDICINE

## 2023-05-22 PROCEDURE — G0439 PPPS, SUBSEQ VISIT: HCPCS | Performed by: FAMILY MEDICINE

## 2023-05-22 SDOH — ECONOMIC STABILITY: FOOD INSECURITY: WITHIN THE PAST 12 MONTHS, YOU WORRIED THAT YOUR FOOD WOULD RUN OUT BEFORE YOU GOT MONEY TO BUY MORE.: PATIENT DECLINED

## 2023-05-22 SDOH — ECONOMIC STABILITY: FOOD INSECURITY: WITHIN THE PAST 12 MONTHS, THE FOOD YOU BOUGHT JUST DIDN'T LAST AND YOU DIDN'T HAVE MONEY TO GET MORE.: PATIENT DECLINED

## 2023-05-22 SDOH — ECONOMIC STABILITY: INCOME INSECURITY: HOW HARD IS IT FOR YOU TO PAY FOR THE VERY BASICS LIKE FOOD, HOUSING, MEDICAL CARE, AND HEATING?: PATIENT DECLINED

## 2023-05-22 SDOH — ECONOMIC STABILITY: HOUSING INSECURITY
IN THE LAST 12 MONTHS, WAS THERE A TIME WHEN YOU DID NOT HAVE A STEADY PLACE TO SLEEP OR SLEPT IN A SHELTER (INCLUDING NOW)?: PATIENT REFUSED

## 2023-05-22 ASSESSMENT — PATIENT HEALTH QUESTIONNAIRE - PHQ9
SUM OF ALL RESPONSES TO PHQ QUESTIONS 1-9: 0
1. LITTLE INTEREST OR PLEASURE IN DOING THINGS: 0
SUM OF ALL RESPONSES TO PHQ QUESTIONS 1-9: 0
SUM OF ALL RESPONSES TO PHQ9 QUESTIONS 1 & 2: 0
2. FEELING DOWN, DEPRESSED OR HOPELESS: 0
SUM OF ALL RESPONSES TO PHQ QUESTIONS 1-9: 0
SUM OF ALL RESPONSES TO PHQ QUESTIONS 1-9: 0

## 2023-05-22 ASSESSMENT — LIFESTYLE VARIABLES
HOW OFTEN DO YOU HAVE A DRINK CONTAINING ALCOHOL: 2-4 TIMES A MONTH
HOW MANY STANDARD DRINKS CONTAINING ALCOHOL DO YOU HAVE ON A TYPICAL DAY: 1 OR 2

## 2023-05-22 NOTE — PATIENT INSTRUCTIONS
Learning About Being Active as an Older Adult  Why is being active important as you get older? Being active is one of the best things you can do for your health. And it's never too late to start. Being active--or getting active, if you aren't already--has definite benefits. It can:  Give you more energy,  Keep your mind sharp. Improve balance to reduce your risk of falls. Help you manage chronic illness with fewer medicines. No matter how old you are, how fit you are, or what health problems you have, there is a form of activity that will work for you. And the more physical activity you can do, the better your overall health will be. What kinds of activity can help you stay healthy? Being more active will make your daily activities easier. Physical activity includes planned exercise and things you do in daily life. There are four types of activity:  Aerobic. Doing aerobic activity makes your heart and lungs strong. Includes walking, dancing, and gardening. Aim for at least 2½ hours spread throughout the week. It improves your energy and can help you sleep better. Muscle-strengthening. This type of activity can help maintain muscle and strengthen bones. Includes climbing stairs, using resistance bands, and lifting or carrying heavy loads. Aim for at least twice a week. It can help protect the knees and other joints. Stretching. Stretching gives you better range of motion in joints and muscles. Includes upper arm stretches, calf stretches, and gentle yoga. Aim for at least twice a week, preferably after your muscles are warmed up from other activities. It can help you function better in daily life. Balancing. This helps you stay coordinated and have good posture. Includes heel-to-toe walking, hal chi, and certain types of yoga. Aim for at least 3 days a week. It can reduce your risk of falling.   Even if you have a hard time meeting the recommendations, it's better to be more active

## 2023-05-22 NOTE — PROGRESS NOTES
Medicare Annual Wellness Visit    Dawson Sumner is here for 6 Month Follow-Up and Medicare AWV    Assessment & Plan   Medicare annual wellness visit, subsequent  Weight loss, unintentional - work up weight loss. States she is UTD on mammo and colonoscopy. She will follow up with GYN for PAP. Get labs/ imaging. If workup normal and weight loss continues may need chest CT and referral to GI.   -     CBC with Auto Differential; Future  -     Comprehensive Metabolic Panel; Future  -     TSH; Future  -     XR CHEST (2 VIEWS); Future  -     Urinalysis with Microscopic; Future  Tobacco use - Advised cessation. Hyperlipidemia, unspecified hyperlipidemia type  -     Lipid Panel; Future  Primary hypertension  -     CBC with Auto Differential; Future  -     Comprehensive Metabolic Panel; Future  Prediabetes  -     Hemoglobin A1C; Future      Recommendations for Preventive Services Due: see orders and patient instructions/AVS.  Recommended screening schedule for the next 5-10 years is provided to the patient in written form: see Patient Instructions/AVS.     No follow-ups on file. Subjective   Had recent foot surgery. On Roxicodone currently for pain. HLD - On Atorvastatin. HTN - On HCTZ, Irbesartan. Allergy/ Asthma - On Singulair, Albuterol. GERD - On Protonix. Denies any current chest pain, shortness of breath, abd pain, blood in stool. Patient's complete Health Risk Assessment and screening values have been reviewed and are found in Flowsheets. The following problems were reviewed today and where indicated follow up appointments were made and/or referrals ordered. - Has not had a tetanus in last 10 years. Discussed to get at local pharmacy.  - States UTD on COVID vaccines. Positive Risk Factor Screenings with Interventions:            Opioid Risk: (Low risk score <55) Opioid risk score: 20    Patient is low risk for opioid use disorder or overdose.   Last PDMP Sarbjit Fonseca as Reviewed:  Review User

## 2023-05-23 DIAGNOSIS — D75.89 MACROCYTOSIS WITHOUT ANEMIA: Primary | ICD-10-CM

## 2023-05-23 LAB
ALBUMIN SERPL-MCNC: 4 G/DL (ref 3.5–5)
ALBUMIN/GLOB SERPL: 1.5 (ref 1.1–2.2)
ALP SERPL-CCNC: 105 U/L (ref 45–117)
ALT SERPL-CCNC: 19 U/L (ref 12–78)
ANION GAP SERPL CALC-SCNC: 3 MMOL/L (ref 5–15)
APPEARANCE UR: CLEAR
AST SERPL-CCNC: 15 U/L (ref 15–37)
BACTERIA URNS QL MICRO: NEGATIVE /HPF
BASOPHILS # BLD: 0.1 K/UL (ref 0–0.1)
BASOPHILS NFR BLD: 1 % (ref 0–1)
BILIRUB SERPL-MCNC: 0.6 MG/DL (ref 0.2–1)
BILIRUB UR QL: NEGATIVE
BUN SERPL-MCNC: 9 MG/DL (ref 6–20)
BUN/CREAT SERPL: 14 (ref 12–20)
CALCIUM SERPL-MCNC: 8.9 MG/DL (ref 8.5–10.1)
CHLORIDE SERPL-SCNC: 109 MMOL/L (ref 97–108)
CHOLEST SERPL-MCNC: 148 MG/DL
CO2 SERPL-SCNC: 28 MMOL/L (ref 21–32)
COLOR UR: NORMAL
CREAT SERPL-MCNC: 0.63 MG/DL (ref 0.55–1.02)
DIFFERENTIAL METHOD BLD: ABNORMAL
EOSINOPHIL # BLD: 0.1 K/UL (ref 0–0.4)
EOSINOPHIL NFR BLD: 1 % (ref 0–7)
EPITH CASTS URNS QL MICRO: NORMAL /LPF
ERYTHROCYTE [DISTWIDTH] IN BLOOD BY AUTOMATED COUNT: 13.2 % (ref 11.5–14.5)
EST. AVERAGE GLUCOSE BLD GHB EST-MCNC: 111 MG/DL
GLOBULIN SER CALC-MCNC: 2.7 G/DL (ref 2–4)
GLUCOSE SERPL-MCNC: 95 MG/DL (ref 65–100)
GLUCOSE UR STRIP.AUTO-MCNC: NEGATIVE MG/DL
HBA1C MFR BLD: 5.5 % (ref 4–5.6)
HCT VFR BLD AUTO: 38.1 % (ref 35–47)
HDLC SERPL-MCNC: 61 MG/DL
HDLC SERPL: 2.4 (ref 0–5)
HGB BLD-MCNC: 13.1 G/DL (ref 11.5–16)
HGB UR QL STRIP: NEGATIVE
HYALINE CASTS URNS QL MICRO: NORMAL /LPF (ref 0–5)
IMM GRANULOCYTES # BLD AUTO: 0.1 K/UL (ref 0–0.04)
IMM GRANULOCYTES NFR BLD AUTO: 1 % (ref 0–0.5)
KETONES UR QL STRIP.AUTO: NEGATIVE MG/DL
LDLC SERPL CALC-MCNC: 66.6 MG/DL (ref 0–100)
LEUKOCYTE ESTERASE UR QL STRIP.AUTO: NEGATIVE
LYMPHOCYTES # BLD: 1.5 K/UL (ref 0.8–3.5)
LYMPHOCYTES NFR BLD: 15 % (ref 12–49)
MCH RBC QN AUTO: 35.1 PG (ref 26–34)
MCHC RBC AUTO-ENTMCNC: 34.4 G/DL (ref 30–36.5)
MCV RBC AUTO: 102.1 FL (ref 80–99)
MONOCYTES # BLD: 0.7 K/UL (ref 0–1)
MONOCYTES NFR BLD: 8 % (ref 5–13)
NEUTS SEG # BLD: 7.3 K/UL (ref 1.8–8)
NEUTS SEG NFR BLD: 74 % (ref 32–75)
NITRITE UR QL STRIP.AUTO: NEGATIVE
NRBC # BLD: 0 K/UL (ref 0–0.01)
NRBC BLD-RTO: 0 PER 100 WBC
PH UR STRIP: 6.5 (ref 5–8)
PLATELET # BLD AUTO: 238 K/UL (ref 150–400)
PMV BLD AUTO: 10.3 FL (ref 8.9–12.9)
POTASSIUM SERPL-SCNC: 3.9 MMOL/L (ref 3.5–5.1)
PROT SERPL-MCNC: 6.7 G/DL (ref 6.4–8.2)
PROT UR STRIP-MCNC: NEGATIVE MG/DL
RBC # BLD AUTO: 3.73 M/UL (ref 3.8–5.2)
RBC #/AREA URNS HPF: NORMAL /HPF (ref 0–5)
SODIUM SERPL-SCNC: 140 MMOL/L (ref 136–145)
SP GR UR REFRACTOMETRY: 1.01 (ref 1–1.03)
TRIGL SERPL-MCNC: 102 MG/DL
TSH SERPL DL<=0.05 MIU/L-ACNC: 1.26 UIU/ML (ref 0.36–3.74)
UROBILINOGEN UR QL STRIP.AUTO: 0.2 EU/DL (ref 0.2–1)
VLDLC SERPL CALC-MCNC: 20.4 MG/DL
WBC # BLD AUTO: 9.7 K/UL (ref 3.6–11)
WBC URNS QL MICRO: NORMAL /HPF (ref 0–4)

## 2023-05-31 ENCOUNTER — TELEPHONE (OUTPATIENT)
Dept: PRIMARY CARE CLINIC | Facility: CLINIC | Age: 68
End: 2023-05-31

## 2023-05-31 DIAGNOSIS — R05.9 COUGH, UNSPECIFIED TYPE: Primary | ICD-10-CM

## 2023-05-31 DIAGNOSIS — R05.2 SUBACUTE COUGH: ICD-10-CM

## 2023-05-31 DIAGNOSIS — R63.4 WEIGHT LOSS: ICD-10-CM

## 2023-05-31 NOTE — TELEPHONE ENCOUNTER
Patient reports that they continue to lose weight. They also report a cough that hurts their throat and both sides of their ribcage. Patient said that Paresh Moraes expressed concern during their appt earlier this week, and now the patient shares the concern. Patient requested an appt as soon as possible. Nothing available until 6/21.

## 2023-05-31 NOTE — TELEPHONE ENCOUNTER
Spoke to patient. Patient states she has lost an additional 2 pounds. When she coughs she states her ribs hurt. And when she takes a deep breath. Reviewed recent labs with patient and let her know that Dr. Remington Woodson put some additional labs in for her to get done. Patient will get those done and would like for Dr. Remington Woodson to order some additional imaging if she can.

## 2023-06-01 ENCOUNTER — HOSPITAL ENCOUNTER (OUTPATIENT)
Facility: HOSPITAL | Age: 68
Discharge: HOME | End: 2023-06-01
Payer: MEDICARE

## 2023-06-01 DIAGNOSIS — D75.89 MACROCYTOSIS WITHOUT ANEMIA: ICD-10-CM

## 2023-06-01 DIAGNOSIS — R05.2 SUBACUTE COUGH: ICD-10-CM

## 2023-06-01 DIAGNOSIS — R05.9 COUGH, UNSPECIFIED TYPE: ICD-10-CM

## 2023-06-01 DIAGNOSIS — R63.4 WEIGHT LOSS: ICD-10-CM

## 2023-06-01 PROCEDURE — 71250 CT THORAX DX C-: CPT

## 2023-06-01 NOTE — TELEPHONE ENCOUNTER
Spoke to patient Per Virl Spatz order for chest CT. She can call central scheduling to set up [phone]. IF negative strongly recommend she see GI. May need scope. Patient will call and make appt. No other questions at this time.

## 2023-06-02 LAB
FOLATE SERPL-MCNC: 30 NG/ML (ref 5–21)
VIT B12 SERPL-MCNC: 307 PG/ML (ref 193–986)

## 2023-06-22 DIAGNOSIS — J30.89 OTHER ALLERGIC RHINITIS: ICD-10-CM

## 2023-06-23 DIAGNOSIS — R10.10 UPPER ABDOMINAL PAIN, UNSPECIFIED: ICD-10-CM

## 2023-06-23 DIAGNOSIS — I10 ESSENTIAL (PRIMARY) HYPERTENSION: ICD-10-CM

## 2023-06-26 RX ORDER — MONTELUKAST SODIUM 10 MG/1
TABLET ORAL
Qty: 90 TABLET | Refills: 3 | Status: SHIPPED | OUTPATIENT
Start: 2023-06-26

## 2023-06-27 RX ORDER — HYDROCHLOROTHIAZIDE 12.5 MG/1
CAPSULE, GELATIN COATED ORAL
Qty: 90 CAPSULE | Refills: 1 | Status: SHIPPED | OUTPATIENT
Start: 2023-06-27

## 2023-06-27 RX ORDER — PANTOPRAZOLE SODIUM 20 MG/1
TABLET, DELAYED RELEASE ORAL
Qty: 90 TABLET | Refills: 1 | Status: SHIPPED | OUTPATIENT
Start: 2023-06-27

## 2023-06-27 RX ORDER — IRBESARTAN 300 MG/1
TABLET ORAL
Qty: 90 TABLET | Refills: 1 | Status: SHIPPED | OUTPATIENT
Start: 2023-06-27

## 2023-06-27 RX ORDER — ATORVASTATIN CALCIUM 40 MG/1
TABLET, FILM COATED ORAL
Qty: 90 TABLET | Refills: 1 | Status: SHIPPED | OUTPATIENT
Start: 2023-06-27

## 2023-06-27 NOTE — TELEPHONE ENCOUNTER
PCP: Viridiana Graves DO    Last Visit 5/22/2023   Future Appointments   Date Time Provider [address]   6/28/2023 10:30 AM Tere Kirby MD [facility] AMB   8/1/2023  8:20 AM MD LELO Cage BS AMB   11/13/2023 10:00 AM Viridiana Graves DO [facility] AM[facility]       Requested Prescriptions     Pending Prescriptions Disp Refills    pantoprazole (PROTONIX) 20 MG tablet [Pharmacy Med Name: PANTOPRAZOLE SOD DR 20 MG TAB] 90 tablet 1     Sig: TAKE 1 TABLET BY MOUTH EVERY DAY    irbesartan (AVAPRO) 300 MG tablet [Pharmacy Med Name: IRBESARTAN 300 MG TABLET] 90 tablet 3     Sig: TAKE 1 TABLET BY MOUTH EVERY DAY    hydroCHLOROthiazide (MICROZIDE) 12.5 MG capsule [Pharmacy Med Name: HYDROCHLOROTHIAZIDE 12.5 MG CP] 90 capsule 3     Sig: TAKE 1 CAPSULE BY MOUTH EVERY DAY    atorvastatin (LIPITOR) 40 MG tablet [Pharmacy Med Name: ATORVASTATIN 40 MG TABLET] 90 tablet 3     Sig: TAKE 1 TABLET BY MOUTH NIGHTLY         Other Comments: Last Refill 06/17/2022,01/05/2023

## 2023-08-01 ENCOUNTER — OFFICE VISIT (OUTPATIENT)
Age: 68
End: 2023-08-01
Payer: MEDICARE

## 2023-08-01 VITALS
SYSTOLIC BLOOD PRESSURE: 120 MMHG | HEIGHT: 59 IN | RESPIRATION RATE: 16 BRPM | BODY MASS INDEX: 24.44 KG/M2 | DIASTOLIC BLOOD PRESSURE: 80 MMHG | HEART RATE: 73 BPM | WEIGHT: 121.2 LBS | OXYGEN SATURATION: 98 %

## 2023-08-01 DIAGNOSIS — F17.200 SMOKER: ICD-10-CM

## 2023-08-01 DIAGNOSIS — I10 ESSENTIAL HYPERTENSION: ICD-10-CM

## 2023-08-01 DIAGNOSIS — I25.84 CORONARY ARTERY CALCIFICATION: Primary | ICD-10-CM

## 2023-08-01 DIAGNOSIS — E78.00 HYPERCHOLESTEROLEMIA: ICD-10-CM

## 2023-08-01 DIAGNOSIS — I25.10 CORONARY ARTERY CALCIFICATION: Primary | ICD-10-CM

## 2023-08-01 PROCEDURE — G8420 CALC BMI NORM PARAMETERS: HCPCS | Performed by: SPECIALIST

## 2023-08-01 PROCEDURE — 99204 OFFICE O/P NEW MOD 45 MIN: CPT | Performed by: SPECIALIST

## 2023-08-01 PROCEDURE — 93000 ELECTROCARDIOGRAM COMPLETE: CPT | Performed by: SPECIALIST

## 2023-08-01 PROCEDURE — G8427 DOCREV CUR MEDS BY ELIG CLIN: HCPCS | Performed by: SPECIALIST

## 2023-08-01 PROCEDURE — 3079F DIAST BP 80-89 MM HG: CPT | Performed by: SPECIALIST

## 2023-08-01 PROCEDURE — 1090F PRES/ABSN URINE INCON ASSESS: CPT | Performed by: SPECIALIST

## 2023-08-01 PROCEDURE — 3074F SYST BP LT 130 MM HG: CPT | Performed by: SPECIALIST

## 2023-08-01 NOTE — PROGRESS NOTES
Readings from Last 3 Encounters:   08/01/23 121 lb 3.2 oz (55 kg)   06/28/23 125 lb (56.7 kg)   05/31/23 125 lb (56.7 kg)      BP Readings from Last 3 Encounters:   08/01/23 120/80   05/22/23 125/79   11/07/22 123/75        Current Outpatient Medications:     pantoprazole (PROTONIX) 20 MG tablet, TAKE 1 TABLET BY MOUTH EVERY DAY, Disp: 90 tablet, Rfl: 1    irbesartan (AVAPRO) 300 MG tablet, TAKE 1 TABLET BY MOUTH EVERY DAY, Disp: 90 tablet, Rfl: 1    hydroCHLOROthiazide (MICROZIDE) 12.5 MG capsule, TAKE 1 CAPSULE BY MOUTH EVERY DAY, Disp: 90 capsule, Rfl: 1    atorvastatin (LIPITOR) 40 MG tablet, TAKE 1 TABLET BY MOUTH NIGHTLY, Disp: 90 tablet, Rfl: 1    montelukast (SINGULAIR) 10 MG tablet, TAKE 1 TABLET BY MOUTH EVERY DAY, Disp: 90 tablet, Rfl: 3    albuterol sulfate HFA (PROVENTIL;VENTOLIN;PROAIR) 108 (90 Base) MCG/ACT inhaler, Inhale 2 puffs into the lungs every 6 hours as needed, Disp: , Rfl:     vitamin D 25 MCG (1000 UT) CAPS, Take 1 capsule by mouth daily, Disp: , Rfl:     indomethacin (INDOCIN) 25 MG capsule, Take 1 capsule by mouth 3 times daily as needed, Disp: , Rfl:    Impression see above.

## 2023-08-01 NOTE — PATIENT INSTRUCTIONS
You have been scheduled for a nuclear stress test and an echo. We will send results via [employer]. Nuclear stress testing evaluates blood flow to your heart muscle and assesses cardiac function. There are 2 parts (Rest/Stress) to this procedure and will include exercise on a treadmill     *Please arrive 15 minutes prior to your appointment time    Test Duration:    -One day testing will take 4 hours    Day of testing instructions:    NO CAFFEINE (not even decaffeinated products) 24 HOURS PRIOR TO TESTING. This includes coffee, soda, tea, chocolate, multivitamins, and migraine medication, like Excedrin or Fioricet that contains caffeine. Nothing to eat or drink 4 HOURS prior to testing  NO NICOTINE 12 hours prior to testing  Hold any medications requested by your cardiologist. Otherwise take medications as directed with a few sips of water.  Wear comfortable clothes and shoes (Shirts with no metal, shorts or pants, tennis shoes, no heels or flip flops)

## 2023-08-15 ENCOUNTER — ANCILLARY PROCEDURE (OUTPATIENT)
Age: 68
End: 2023-08-15
Payer: MEDICARE

## 2023-08-15 VITALS
HEIGHT: 59 IN | HEART RATE: 83 BPM | WEIGHT: 121 LBS | SYSTOLIC BLOOD PRESSURE: 108 MMHG | DIASTOLIC BLOOD PRESSURE: 60 MMHG | BODY MASS INDEX: 24.39 KG/M2

## 2023-08-15 VITALS
SYSTOLIC BLOOD PRESSURE: 108 MMHG | DIASTOLIC BLOOD PRESSURE: 60 MMHG | WEIGHT: 121 LBS | HEIGHT: 59 IN | BODY MASS INDEX: 24.39 KG/M2

## 2023-08-15 DIAGNOSIS — I25.84 CORONARY ARTERY CALCIFICATION: ICD-10-CM

## 2023-08-15 DIAGNOSIS — E78.00 HYPERCHOLESTEROLEMIA: ICD-10-CM

## 2023-08-15 DIAGNOSIS — I10 ESSENTIAL HYPERTENSION: ICD-10-CM

## 2023-08-15 DIAGNOSIS — I25.10 CORONARY ARTERY CALCIFICATION: ICD-10-CM

## 2023-08-15 DIAGNOSIS — F17.200 SMOKER: ICD-10-CM

## 2023-08-15 PROCEDURE — 78452 HT MUSCLE IMAGE SPECT MULT: CPT

## 2023-08-15 PROCEDURE — 93306 TTE W/DOPPLER COMPLETE: CPT

## 2023-08-15 PROCEDURE — A9500 TC99M SESTAMIBI: HCPCS | Performed by: SPECIALIST

## 2023-08-15 RX ORDER — TETRAKIS(2-METHOXYISOBUTYLISOCYANIDE)COPPER(I) TETRAFLUOROBORATE 1 MG/ML
7.6 INJECTION, POWDER, LYOPHILIZED, FOR SOLUTION INTRAVENOUS
Status: COMPLETED | OUTPATIENT
Start: 2023-08-15 | End: 2023-08-15

## 2023-08-15 RX ORDER — TETRAKIS(2-METHOXYISOBUTYLISOCYANIDE)COPPER(I) TETRAFLUOROBORATE 1 MG/ML
26.4 INJECTION, POWDER, LYOPHILIZED, FOR SOLUTION INTRAVENOUS
Status: COMPLETED | OUTPATIENT
Start: 2023-08-15 | End: 2023-08-15

## 2023-08-15 RX ADMIN — TECHNETIUM TC-99M SESTAMIBI 26.4 MILLICURIE: 1 INJECTION INTRAVENOUS at 13:35

## 2023-08-15 RX ADMIN — TECHNETIUM TC-99M SESTAMIBI 7.6 MILLICURIE: 1 INJECTION INTRAVENOUS at 12:35

## 2023-08-17 LAB
ECHO BSA: 1.51 M2
NUC STRESS EJECTION FRACTION: 74 %
STRESS ANGINA INDEX: 0
STRESS BASELINE DIAS BP: 60 MMHG
STRESS BASELINE HR: 83 BPM
STRESS BASELINE ST DEPRESSION: 0 MM
STRESS BASELINE SYS BP: 108 MMHG
STRESS ESTIMATED WORKLOAD: 7 METS
STRESS EXERCISE DUR MIN: 5 MIN
STRESS EXERCISE DUR SEC: 0 SEC
STRESS O2 SAT PEAK: 98 %
STRESS O2 SAT REST: 100 %
STRESS PEAK DIAS BP: 72 MMHG
STRESS PEAK SYS BP: 148 MMHG
STRESS PERCENT HR ACHIEVED: 87 %
STRESS POST PEAK HR: 134 BPM
STRESS RATE PRESSURE PRODUCT: NORMAL BPM*MMHG
STRESS SR DUKE TREADMILL SCORE: 5
STRESS ST DEPRESSION: 0 MM
STRESS TARGET HR: 154 BPM
TID: 1.08

## 2023-08-17 NOTE — RESULT ENCOUNTER NOTE
Called and spoke to patient -ID [id number]   Nuclear stress test normal  She is doing well, had Ca on CT  Fu with Dr Paul Cancer for cardiovascular disease risk factors modification  Pt pleased with results  Advised to contact back if any chest pain or new cardiac symptoms

## 2023-08-19 ENCOUNTER — HOSPITAL ENCOUNTER (EMERGENCY)
Facility: HOSPITAL | Age: 68
Discharge: HOME | End: 2023-08-19
Attending: EMERGENCY MEDICINE
Payer: MEDICARE

## 2023-08-19 VITALS
BODY MASS INDEX: 23.96 KG/M2 | HEART RATE: 96 BPM | RESPIRATION RATE: 16 BRPM | WEIGHT: 118.83 LBS | OXYGEN SATURATION: 93 % | HEIGHT: 59 IN | DIASTOLIC BLOOD PRESSURE: 64 MMHG | TEMPERATURE: 99.5 F | SYSTOLIC BLOOD PRESSURE: 115 MMHG

## 2023-08-19 DIAGNOSIS — U07.1 COVID-19: Primary | ICD-10-CM

## 2023-08-19 LAB
SARS-COV-2 RDRP RESP QL NAA+PROBE: DETECTED
SOURCE: ABNORMAL

## 2023-08-19 PROCEDURE — 87635 SARS-COV-2 COVID-19 AMP PRB: CPT

## 2023-08-19 PROCEDURE — 99283 EMERGENCY DEPT VISIT LOW MDM: CPT

## 2023-08-19 RX ORDER — BENZONATATE 100 MG/1
100 CAPSULE ORAL 2 TIMES DAILY PRN
Qty: 20 CAPSULE | Refills: 0 | Status: SHIPPED | OUTPATIENT
Start: 2023-08-19 | End: 2023-08-26

## 2023-08-19 ASSESSMENT — ENCOUNTER SYMPTOMS
COLOR CHANGE: 0
SORE THROAT: 0
SHORTNESS OF BREATH: 0
COUGH: 1
ABDOMINAL PAIN: 0
BACK PAIN: 0
VOMITING: 0
DIARRHEA: 0

## 2023-08-19 ASSESSMENT — LIFESTYLE VARIABLES
HOW OFTEN DO YOU HAVE A DRINK CONTAINING ALCOHOL: 2-3 TIMES A WEEK
HOW MANY STANDARD DRINKS CONTAINING ALCOHOL DO YOU HAVE ON A TYPICAL DAY: 1 OR 2

## 2023-08-19 ASSESSMENT — PAIN DESCRIPTION - DESCRIPTORS: DESCRIPTORS: ACHING

## 2023-08-19 ASSESSMENT — PAIN DESCRIPTION - LOCATION: LOCATION: GENERALIZED

## 2023-08-19 ASSESSMENT — PAIN DESCRIPTION - ONSET: ONSET: ON-GOING

## 2023-08-19 ASSESSMENT — PAIN DESCRIPTION - FREQUENCY: FREQUENCY: CONTINUOUS

## 2023-08-19 ASSESSMENT — PAIN - FUNCTIONAL ASSESSMENT: PAIN_FUNCTIONAL_ASSESSMENT: 0-10

## 2023-08-19 ASSESSMENT — PAIN DESCRIPTION - PAIN TYPE: TYPE: ACUTE PAIN

## 2023-08-19 ASSESSMENT — PAIN SCALES - GENERAL: PAINLEVEL_OUTOF10: 8

## 2023-08-19 NOTE — ED TRIAGE NOTES
Patient reports nasal congestion, non-productive cough, decreased appetite, body aches and dizziness since Wednesday. Patient taking OTC flu medication daily since Wednesday. Pt denies fever, vomiting or diarrhea.

## 2023-08-19 NOTE — ED NOTES
Pt ambulatory out of ED with discharge instructions and prescriptions in hand given by Dr. Jason Hill and Avtar Morales., PA; pt verbalized understanding of discharge paperwork and time allotted for questions. VSS. Pt alert and oriented. Pt accompanied by family friend.        Leyla Bonilla RN  08/19/23 5057

## 2023-08-19 NOTE — ED PROVIDER NOTES
Robin Wright, [address] [place] [address] [address]  [phone]    Schedule an appointment as soon as possible for a visit       [facility]  [address] [address] [id number] [phone]  Go to   If symptoms worsen    DISCHARGE MEDICATIONS:  New Prescriptions    BENZONATATE (TESSALON) 100 MG CAPSULE    Take 1 capsule by mouth 2 times daily as needed for Cough     Return to the ED if worse    (Please note that portions of this note were completed with a voice recognition program.  Efforts were made to edit the dictations but occasionally words are mis-transcribed.)    CORNELIUS Gorman (electronically signed)      Procedures          CORNELIUS Gorman  08/19/23 1403

## 2023-08-19 NOTE — DISCHARGE INSTRUCTIONS
You have been tested positive for the COVID-19 virus. Per the CDC guidelines we recommend home isolation until the following conditions are all met:    1. At least 5 days have passed since symptoms first appeared AND  2. At least 24 hours have passed since last fever without the use of fever-reducing medications AND  3. Symptoms (e.g., cough, shortness of breath) have improved    For day 6-10 of your illness you should continue to wear a well-fitting mask at all times and try to distance yourselves from others. If you are unable to wear a mask, then you should continue to stay home and isolate. You should avoid being around individuals who are at a higher risk of illness until 10 days have passed. You can purchase on pulse oximeter monitor to check your oxygen levels at home. You want this number to stay above 90%. If it falls below, you should seek medical attention. Return to emergency department with any severe chest pain, difficulty breathing, low oxygen levels, dizziness, or any other severe symptoms.

## 2023-08-31 LAB
ECHO AO ASC DIAM: 3.1 CM
ECHO AO ASCENDING AORTA INDEX: 2.08 CM/M2
ECHO AO ROOT DIAM: 2.8 CM
ECHO AO ROOT INDEX: 1.88 CM/M2
ECHO AV AREA PEAK VELOCITY: 2.8 CM2
ECHO AV AREA VTI: 2.8 CM2
ECHO AV AREA/BSA PEAK VELOCITY: 1.9 CM2/M2
ECHO AV AREA/BSA VTI: 1.9 CM2/M2
ECHO AV MEAN GRADIENT: 3 MMHG
ECHO AV MEAN VELOCITY: 0.8 M/S
ECHO AV PEAK GRADIENT: 6 MMHG
ECHO AV PEAK VELOCITY: 1.2 M/S
ECHO AV VELOCITY RATIO: 0.83
ECHO AV VTI: 24 CM
ECHO BSA: 1.51 M2
ECHO EST RA PRESSURE: 3 MMHG
ECHO LA DIAMETER INDEX: 2.48 CM/M2
ECHO LA DIAMETER: 3.7 CM
ECHO LA TO AORTIC ROOT RATIO: 1.32
ECHO LA VOL 2C: 42 ML (ref 22–52)
ECHO LA VOL 4C: 31 ML (ref 22–52)
ECHO LA VOL BP: 38 ML (ref 22–52)
ECHO LA VOL/BSA BIPLANE: 26 ML/M2 (ref 16–34)
ECHO LA VOLUME AREA LENGTH: 40 ML
ECHO LA VOLUME INDEX A2C: 28 ML/M2 (ref 16–34)
ECHO LA VOLUME INDEX A4C: 21 ML/M2 (ref 16–34)
ECHO LA VOLUME INDEX AREA LENGTH: 27 ML/M2 (ref 16–34)
ECHO LV E' LATERAL VELOCITY: 10 CM/S
ECHO LV E' SEPTAL VELOCITY: 8 CM/S
ECHO LV EJECTION FRACTION A2C: 63 %
ECHO LV EJECTION FRACTION A4C: 55 %
ECHO LV EJECTION FRACTION BIPLANE: 63 % (ref 55–100)
ECHO LV FRACTIONAL SHORTENING: 51 % (ref 28–44)
ECHO LV INTERNAL DIMENSION DIASTOLE INDEX: 2.89 CM/M2
ECHO LV INTERNAL DIMENSION DIASTOLIC: 4.3 CM (ref 3.9–5.3)
ECHO LV INTERNAL DIMENSION SYSTOLIC INDEX: 1.41 CM/M2
ECHO LV INTERNAL DIMENSION SYSTOLIC: 2.1 CM
ECHO LV IVSD: 0.9 CM (ref 0.6–0.9)
ECHO LV MASS 2D: 123.3 G (ref 67–162)
ECHO LV MASS INDEX 2D: 82.8 G/M2 (ref 43–95)
ECHO LV POSTERIOR WALL DIASTOLIC: 0.9 CM (ref 0.6–0.9)
ECHO LV RELATIVE WALL THICKNESS RATIO: 0.42
ECHO LVOT AREA: 3.5 CM2
ECHO LVOT AV VTI INDEX: 0.81
ECHO LVOT DIAM: 2.1 CM
ECHO LVOT MEAN GRADIENT: 2 MMHG
ECHO LVOT PEAK GRADIENT: 4 MMHG
ECHO LVOT PEAK VELOCITY: 1 M/S
ECHO LVOT STROKE VOLUME INDEX: 45.3 ML/M2
ECHO LVOT SV: 67.5 ML
ECHO LVOT VTI: 19.5 CM
ECHO MV A VELOCITY: 1.05 M/S
ECHO MV E DECELERATION TIME (DT): 198.2 MS
ECHO MV E VELOCITY: 0.83 M/S
ECHO MV E/A RATIO: 0.79
ECHO MV E/E' LATERAL: 8.3
ECHO MV E/E' RATIO (AVERAGED): 9.34
ECHO MV E/E' SEPTAL: 10.38
ECHO RIGHT VENTRICULAR SYSTOLIC PRESSURE (RVSP): 28 MMHG
ECHO RV BASAL DIMENSION: 3.3 CM
ECHO RV FREE WALL PEAK S': 13 CM/S
ECHO RV TAPSE: 1.7 CM (ref 1.7–?)
ECHO TV REGURGITANT MAX VELOCITY: 2.51 M/S
ECHO TV REGURGITANT PEAK GRADIENT: 25 MMHG

## 2023-10-23 ENCOUNTER — HOSPITAL ENCOUNTER (OUTPATIENT)
Facility: HOSPITAL | Age: 68
Discharge: HOME | End: 2023-10-26
Payer: MEDICARE

## 2023-10-23 DIAGNOSIS — R63.4 WEIGHT LOSS: ICD-10-CM

## 2023-10-23 DIAGNOSIS — R10.13 EPIGASTRIC PAIN: ICD-10-CM

## 2023-10-23 PROCEDURE — 74177 CT ABD & PELVIS W/CONTRAST: CPT

## 2023-10-23 PROCEDURE — 6360000004 HC RX CONTRAST MEDICATION: Performed by: PHYSICIAN ASSISTANT

## 2023-10-23 RX ADMIN — IOPAMIDOL 100 ML: 755 INJECTION, SOLUTION INTRAVENOUS at 11:11

## 2023-11-06 ENCOUNTER — TRANSCRIBE ORDERS (OUTPATIENT)
Facility: HOSPITAL | Age: 68
End: 2023-11-06

## 2023-11-06 DIAGNOSIS — K76.0 FATTY LIVER: Primary | ICD-10-CM

## 2023-11-22 ENCOUNTER — OFFICE VISIT (OUTPATIENT)
Dept: PRIMARY CARE CLINIC | Facility: CLINIC | Age: 68
End: 2023-11-22

## 2023-11-22 VITALS
DIASTOLIC BLOOD PRESSURE: 76 MMHG | TEMPERATURE: 97.9 F | BODY MASS INDEX: 24.35 KG/M2 | RESPIRATION RATE: 20 BRPM | SYSTOLIC BLOOD PRESSURE: 128 MMHG | OXYGEN SATURATION: 99 % | HEIGHT: 59 IN | HEART RATE: 84 BPM | WEIGHT: 120.8 LBS

## 2023-11-22 DIAGNOSIS — R73.03 PREDIABETES: Primary | ICD-10-CM

## 2023-11-22 DIAGNOSIS — J30.89 OTHER ALLERGIC RHINITIS: ICD-10-CM

## 2023-11-22 DIAGNOSIS — K21.9 GASTROESOPHAGEAL REFLUX DISEASE, UNSPECIFIED WHETHER ESOPHAGITIS PRESENT: ICD-10-CM

## 2023-11-22 DIAGNOSIS — I10 ESSENTIAL (PRIMARY) HYPERTENSION: ICD-10-CM

## 2023-11-22 DIAGNOSIS — R73.03 PREDIABETES: ICD-10-CM

## 2023-11-22 DIAGNOSIS — E78.5 HYPERLIPIDEMIA, UNSPECIFIED HYPERLIPIDEMIA TYPE: ICD-10-CM

## 2023-11-22 LAB
ALBUMIN SERPL-MCNC: 4.4 G/DL (ref 3.5–5)
ALBUMIN/GLOB SERPL: 1.6 (ref 1.1–2.2)
ALP SERPL-CCNC: 120 U/L (ref 45–117)
ALT SERPL-CCNC: 18 U/L (ref 12–78)
ANION GAP SERPL CALC-SCNC: 4 MMOL/L (ref 5–15)
AST SERPL-CCNC: 15 U/L (ref 15–37)
BASOPHILS # BLD: 0.1 K/UL (ref 0–0.1)
BASOPHILS NFR BLD: 1 % (ref 0–1)
BILIRUB SERPL-MCNC: 0.6 MG/DL (ref 0.2–1)
BUN SERPL-MCNC: 16 MG/DL (ref 6–20)
BUN/CREAT SERPL: 19 (ref 12–20)
CALCIUM SERPL-MCNC: 10.1 MG/DL (ref 8.5–10.1)
CHLORIDE SERPL-SCNC: 108 MMOL/L (ref 97–108)
CO2 SERPL-SCNC: 29 MMOL/L (ref 21–32)
CREAT SERPL-MCNC: 0.86 MG/DL (ref 0.55–1.02)
DIFFERENTIAL METHOD BLD: ABNORMAL
EOSINOPHIL # BLD: 0.1 K/UL (ref 0–0.4)
EOSINOPHIL NFR BLD: 1 % (ref 0–7)
ERYTHROCYTE [DISTWIDTH] IN BLOOD BY AUTOMATED COUNT: 13.3 % (ref 11.5–14.5)
GLOBULIN SER CALC-MCNC: 2.8 G/DL (ref 2–4)
GLUCOSE SERPL-MCNC: 97 MG/DL (ref 65–100)
HCT VFR BLD AUTO: 36.7 % (ref 35–47)
HGB BLD-MCNC: 12.7 G/DL (ref 11.5–16)
IMM GRANULOCYTES # BLD AUTO: 0.1 K/UL (ref 0–0.04)
IMM GRANULOCYTES NFR BLD AUTO: 1 % (ref 0–0.5)
LYMPHOCYTES # BLD: 1.9 K/UL (ref 0.8–3.5)
LYMPHOCYTES NFR BLD: 22 % (ref 12–49)
MCH RBC QN AUTO: 33.4 PG (ref 26–34)
MCHC RBC AUTO-ENTMCNC: 34.6 G/DL (ref 30–36.5)
MCV RBC AUTO: 96.6 FL (ref 80–99)
MONOCYTES # BLD: 0.6 K/UL (ref 0–1)
MONOCYTES NFR BLD: 7 % (ref 5–13)
NEUTS SEG # BLD: 6 K/UL (ref 1.8–8)
NEUTS SEG NFR BLD: 68 % (ref 32–75)
NRBC # BLD: 0 K/UL (ref 0–0.01)
NRBC BLD-RTO: 0 PER 100 WBC
PLATELET # BLD AUTO: 235 K/UL (ref 150–400)
PMV BLD AUTO: 10 FL (ref 8.9–12.9)
POTASSIUM SERPL-SCNC: 4.8 MMOL/L (ref 3.5–5.1)
PROT SERPL-MCNC: 7.2 G/DL (ref 6.4–8.2)
RBC # BLD AUTO: 3.8 M/UL (ref 3.8–5.2)
RBC MORPH BLD: ABNORMAL
SODIUM SERPL-SCNC: 141 MMOL/L (ref 136–145)
WBC # BLD AUTO: 8.8 K/UL (ref 3.6–11)

## 2023-11-22 RX ORDER — IRBESARTAN 300 MG/1
300 TABLET ORAL DAILY
Qty: 90 TABLET | Refills: 2 | Status: SHIPPED | OUTPATIENT
Start: 2023-11-22

## 2023-11-22 RX ORDER — PANTOPRAZOLE SODIUM 20 MG/1
20 TABLET, DELAYED RELEASE ORAL DAILY
Qty: 90 TABLET | Refills: 2 | Status: SHIPPED | OUTPATIENT
Start: 2023-11-22

## 2023-11-22 RX ORDER — HYDROCHLOROTHIAZIDE 12.5 MG/1
CAPSULE, GELATIN COATED ORAL
Qty: 90 CAPSULE | Refills: 2 | Status: SHIPPED | OUTPATIENT
Start: 2023-11-22

## 2023-11-22 RX ORDER — MONTELUKAST SODIUM 10 MG/1
10 TABLET ORAL DAILY
Qty: 90 TABLET | Refills: 2 | Status: SHIPPED | OUTPATIENT
Start: 2023-11-22

## 2023-11-22 RX ORDER — ATORVASTATIN CALCIUM 40 MG/1
40 TABLET, FILM COATED ORAL NIGHTLY
Qty: 90 TABLET | Refills: 2 | Status: SHIPPED | OUTPATIENT
Start: 2023-11-22

## 2023-11-22 ASSESSMENT — ENCOUNTER SYMPTOMS
ABDOMINAL PAIN: 0
SHORTNESS OF BREATH: 0

## 2023-11-23 LAB
EST. AVERAGE GLUCOSE BLD GHB EST-MCNC: 108 MG/DL
HBA1C MFR BLD: 5.4 % (ref 4–5.6)

## 2023-11-24 ENCOUNTER — HOSPITAL ENCOUNTER (OUTPATIENT)
Facility: HOSPITAL | Age: 68
End: 2023-11-24
Payer: MEDICARE

## 2023-11-24 DIAGNOSIS — K76.0 FATTY LIVER: ICD-10-CM

## 2023-11-24 PROCEDURE — 76981 USE PARENCHYMA: CPT

## 2024-01-10 DIAGNOSIS — I10 ESSENTIAL (PRIMARY) HYPERTENSION: ICD-10-CM

## 2024-01-11 RX ORDER — IRBESARTAN 300 MG/1
300 TABLET ORAL DAILY
Qty: 90 TABLET | Refills: 2 | Status: SHIPPED | OUTPATIENT
Start: 2024-01-11

## 2024-01-22 ENCOUNTER — TRANSCRIBE ORDERS (OUTPATIENT)
Facility: HOSPITAL | Age: 69
End: 2024-01-22

## 2024-01-22 DIAGNOSIS — Z12.31 VISIT FOR SCREENING MAMMOGRAM: Primary | ICD-10-CM

## 2024-02-15 ENCOUNTER — HOSPITAL ENCOUNTER (OUTPATIENT)
Facility: HOSPITAL | Age: 69
Discharge: HOME | End: 2024-02-15
Payer: MEDICARE

## 2024-02-15 DIAGNOSIS — Z12.31 VISIT FOR SCREENING MAMMOGRAM: ICD-10-CM

## 2024-02-15 PROCEDURE — 77063 BREAST TOMOSYNTHESIS BI: CPT

## 2024-02-19 ENCOUNTER — HOSPITAL ENCOUNTER (OUTPATIENT)
Facility: HOSPITAL | Age: 69
Discharge: HOME | End: 2024-02-22
Payer: MEDICARE

## 2024-02-19 DIAGNOSIS — K83.8 COMMON BILE DUCT DILATATION: ICD-10-CM

## 2024-02-19 PROCEDURE — 76705 ECHO EXAM OF ABDOMEN: CPT

## 2024-02-29 ENCOUNTER — APPOINTMENT (OUTPATIENT)
Facility: HOSPITAL | Age: 69
End: 2024-02-29
Payer: MEDICARE

## 2024-02-29 ENCOUNTER — HOSPITAL ENCOUNTER (EMERGENCY)
Facility: HOSPITAL | Age: 69
Discharge: HOME | End: 2024-02-29
Attending: EMERGENCY MEDICINE
Payer: MEDICARE

## 2024-02-29 VITALS
SYSTOLIC BLOOD PRESSURE: 135 MMHG | RESPIRATION RATE: 18 BRPM | WEIGHT: 127.65 LBS | HEART RATE: 88 BPM | OXYGEN SATURATION: 97 % | BODY MASS INDEX: 25.73 KG/M2 | HEIGHT: 59 IN | DIASTOLIC BLOOD PRESSURE: 75 MMHG | TEMPERATURE: 97.7 F

## 2024-02-29 DIAGNOSIS — R42 LIGHTHEADEDNESS: Primary | ICD-10-CM

## 2024-02-29 DIAGNOSIS — E86.0 DEHYDRATION: ICD-10-CM

## 2024-02-29 DIAGNOSIS — R07.9 CHEST PAIN, UNSPECIFIED TYPE: ICD-10-CM

## 2024-02-29 LAB
ALBUMIN SERPL-MCNC: 4.1 G/DL (ref 3.5–5)
ALBUMIN/GLOB SERPL: 1.1 (ref 1.1–2.2)
ALP SERPL-CCNC: 133 U/L (ref 45–117)
ALT SERPL-CCNC: 22 U/L (ref 12–78)
ANION GAP SERPL CALC-SCNC: 11 MMOL/L (ref 5–15)
AST SERPL-CCNC: 19 U/L (ref 15–37)
BASOPHILS # BLD: 0.1 K/UL (ref 0–0.1)
BASOPHILS NFR BLD: 1 % (ref 0–1)
BILIRUB SERPL-MCNC: 0.7 MG/DL (ref 0.2–1)
BUN SERPL-MCNC: 12 MG/DL (ref 6–20)
BUN/CREAT SERPL: 16 (ref 12–20)
CALCIUM SERPL-MCNC: 9.1 MG/DL (ref 8.5–10.1)
CHLORIDE SERPL-SCNC: 102 MMOL/L (ref 97–108)
CO2 SERPL-SCNC: 28 MMOL/L (ref 21–32)
CREAT SERPL-MCNC: 0.76 MG/DL (ref 0.55–1.02)
EKG ATRIAL RATE: 89 BPM
EKG DIAGNOSIS: NORMAL
EKG P AXIS: 54 DEGREES
EKG P-R INTERVAL: 132 MS
EKG Q-T INTERVAL: 334 MS
EKG QRS DURATION: 68 MS
EKG QTC CALCULATION (BAZETT): 406 MS
EKG R AXIS: 12 DEGREES
EKG T AXIS: 48 DEGREES
EKG VENTRICULAR RATE: 89 BPM
EOSINOPHIL # BLD: 0.1 K/UL (ref 0–0.4)
EOSINOPHIL NFR BLD: 1 % (ref 0–7)
ERYTHROCYTE [DISTWIDTH] IN BLOOD BY AUTOMATED COUNT: 13.5 % (ref 11.5–14.5)
FLUAV AG NPH QL IA: NEGATIVE
FLUBV AG NOSE QL IA: NEGATIVE
GLOBULIN SER CALC-MCNC: 3.8 G/DL (ref 2–4)
GLUCOSE SERPL-MCNC: 95 MG/DL (ref 65–100)
HCT VFR BLD AUTO: 39.3 % (ref 35–47)
HGB BLD-MCNC: 13.3 G/DL (ref 11.5–16)
IMM GRANULOCYTES # BLD AUTO: 0 K/UL (ref 0–0.04)
IMM GRANULOCYTES NFR BLD AUTO: 0 % (ref 0–0.5)
LYMPHOCYTES # BLD: 1.5 K/UL (ref 0.8–3.5)
LYMPHOCYTES NFR BLD: 15 % (ref 12–49)
MCH RBC QN AUTO: 32.8 PG (ref 26–34)
MCHC RBC AUTO-ENTMCNC: 33.8 G/DL (ref 30–36.5)
MCV RBC AUTO: 97 FL (ref 80–99)
MONOCYTES # BLD: 0.7 K/UL (ref 0–1)
MONOCYTES NFR BLD: 7 % (ref 5–13)
NEUTS SEG # BLD: 7.5 K/UL (ref 1.8–8)
NEUTS SEG NFR BLD: 76 % (ref 32–75)
NRBC # BLD: 0 K/UL (ref 0–0.01)
NRBC BLD-RTO: 0 PER 100 WBC
PLATELET # BLD AUTO: 216 K/UL (ref 150–400)
PMV BLD AUTO: 9.6 FL (ref 8.9–12.9)
POTASSIUM SERPL-SCNC: 4.2 MMOL/L (ref 3.5–5.1)
PROT SERPL-MCNC: 7.9 G/DL (ref 6.4–8.2)
RBC # BLD AUTO: 4.05 M/UL (ref 3.8–5.2)
SARS-COV-2 RDRP RESP QL NAA+PROBE: NOT DETECTED
SODIUM SERPL-SCNC: 141 MMOL/L (ref 136–145)
SOURCE: NORMAL
TROPONIN I SERPL HS-MCNC: 4 NG/L (ref 0–51)
WBC # BLD AUTO: 9.9 K/UL (ref 3.6–11)

## 2024-02-29 PROCEDURE — 2580000003 HC RX 258: Performed by: EMERGENCY MEDICINE

## 2024-02-29 PROCEDURE — 85025 COMPLETE CBC W/AUTO DIFF WBC: CPT

## 2024-02-29 PROCEDURE — 99285 EMERGENCY DEPT VISIT HI MDM: CPT

## 2024-02-29 PROCEDURE — 80053 COMPREHEN METABOLIC PANEL: CPT

## 2024-02-29 PROCEDURE — 36415 COLL VENOUS BLD VENIPUNCTURE: CPT

## 2024-02-29 PROCEDURE — 87804 INFLUENZA ASSAY W/OPTIC: CPT

## 2024-02-29 PROCEDURE — 71046 X-RAY EXAM CHEST 2 VIEWS: CPT

## 2024-02-29 PROCEDURE — 94761 N-INVAS EAR/PLS OXIMETRY MLT: CPT

## 2024-02-29 PROCEDURE — 93005 ELECTROCARDIOGRAM TRACING: CPT | Performed by: EMERGENCY MEDICINE

## 2024-02-29 PROCEDURE — 84484 ASSAY OF TROPONIN QUANT: CPT

## 2024-02-29 PROCEDURE — 96360 HYDRATION IV INFUSION INIT: CPT

## 2024-02-29 PROCEDURE — 87635 SARS-COV-2 COVID-19 AMP PRB: CPT

## 2024-02-29 RX ORDER — 0.9 % SODIUM CHLORIDE 0.9 %
500 INTRAVENOUS SOLUTION INTRAVENOUS ONCE
Status: COMPLETED | OUTPATIENT
Start: 2024-02-29 | End: 2024-02-29

## 2024-02-29 RX ADMIN — SODIUM CHLORIDE 500 ML: 9 INJECTION, SOLUTION INTRAVENOUS at 08:28

## 2024-02-29 ASSESSMENT — PAIN - FUNCTIONAL ASSESSMENT: PAIN_FUNCTIONAL_ASSESSMENT: NONE - DENIES PAIN

## 2024-02-29 ASSESSMENT — ENCOUNTER SYMPTOMS
COUGH: 0
SHORTNESS OF BREATH: 0
ABDOMINAL PAIN: 0

## 2024-02-29 ASSESSMENT — LIFESTYLE VARIABLES
HOW OFTEN DO YOU HAVE A DRINK CONTAINING ALCOHOL: NEVER
HOW OFTEN DO YOU HAVE A DRINK CONTAINING ALCOHOL: NEVER
HOW MANY STANDARD DRINKS CONTAINING ALCOHOL DO YOU HAVE ON A TYPICAL DAY: PATIENT DOES NOT DRINK
HOW MANY STANDARD DRINKS CONTAINING ALCOHOL DO YOU HAVE ON A TYPICAL DAY: PATIENT DOES NOT DRINK

## 2024-02-29 NOTE — ED PROVIDER NOTES
Every Day     Current packs/day: 0.50     Types: Cigarettes    Smokeless tobacco: Never   Substance and Sexual Activity    Alcohol use: Yes     Comment: 2-3 drinks on the weekend    Drug use: No    Sexual activity: Not Currently     Partners: Male     Social Determinants of Health     Financial Resource Strain: Patient Declined (5/22/2023)    Overall Financial Resource Strain (CARDIA)     Difficulty of Paying Living Expenses: Patient declined   Transportation Needs: Unknown (5/22/2023)    PRAPARE - Transportation     Lack of Transportation (Non-Medical): Patient declined   Physical Activity: Inactive (5/22/2023)    Exercise Vital Sign     Days of Exercise per Week: 0 days     Minutes of Exercise per Session: 0 min   Intimate Partner Violence: Not At Risk (6/14/2022)    Humiliation, Afraid, Rape, and Kick questionnaire     Fear of Current or Ex-Partner: No     Emotionally Abused: No     Physically Abused: No     Sexually Abused: No   Housing Stability: Unknown (5/22/2023)    Housing Stability Vital Sign     Unstable Housing in the Last Year: Patient refused       SCREENINGS   NIH Stroke Scale  Interval: Baseline  Level of Consciousness (1a): Alert  LOC Questions (1b): Answers both correctly  LOC Commands (1c): Performs both tasks correctly  Best Gaze (2): Normal  Visual (3): No visual loss  Facial Palsy (4): Normal symmetrical movement  Motor Arm, Left (5a): No drift  Motor Arm, Right (5b): No drift  Motor Leg, Left (6a): No drift  Motor Leg, Right (6b): No drift  Limb Ataxia (7): Absent  Sensory (8): Normal  Best Language (9): No aphasia  Dysarthria (10): Normal  Extinction and Inattention (11): No abnormality  Total: 0     [place] Coma Scale  Eye Opening: Spontaneous  Best Verbal Response: Oriented  Best Motor Response: Obeys commands  Oneil Coma Scale Score: 15                     CIWA Assessment  BP: 134/75  Pulse: 88                 PHYSICAL EXAM       ED Triage Vitals [02/29/24 0811]   BP Temp Temp Source

## 2024-02-29 NOTE — ED TRIAGE NOTES
Pt reports dizziness since she woke up at 2am this morning as well as chest tightness and overall feeling light headed. Pt reports she was short of breath when she woke up and she states she still feels a little short of breath. Pt denies chest tightness at this time.

## 2024-07-07 SDOH — ECONOMIC STABILITY: FOOD INSECURITY: WITHIN THE PAST 12 MONTHS, THE FOOD YOU BOUGHT JUST DIDN'T LAST AND YOU DIDN'T HAVE MONEY TO GET MORE.: NEVER TRUE

## 2024-07-07 SDOH — ECONOMIC STABILITY: FOOD INSECURITY: WITHIN THE PAST 12 MONTHS, YOU WORRIED THAT YOUR FOOD WOULD RUN OUT BEFORE YOU GOT MONEY TO BUY MORE.: NEVER TRUE

## 2024-07-07 SDOH — ECONOMIC STABILITY: HOUSING INSECURITY
IN THE LAST 12 MONTHS, WAS THERE A TIME WHEN YOU DID NOT HAVE A STEADY PLACE TO SLEEP OR SLEPT IN A SHELTER (INCLUDING NOW)?: NO

## 2024-07-07 SDOH — ECONOMIC STABILITY: INCOME INSECURITY: HOW HARD IS IT FOR YOU TO PAY FOR THE VERY BASICS LIKE FOOD, HOUSING, MEDICAL CARE, AND HEATING?: NOT HARD AT ALL

## 2024-07-07 SDOH — ECONOMIC STABILITY: TRANSPORTATION INSECURITY
IN THE PAST 12 MONTHS, HAS LACK OF TRANSPORTATION KEPT YOU FROM MEETINGS, WORK, OR FROM GETTING THINGS NEEDED FOR DAILY LIVING?: NO

## 2024-07-08 ENCOUNTER — OFFICE VISIT (OUTPATIENT)
Dept: PRIMARY CARE CLINIC | Facility: CLINIC | Age: 69
End: 2024-07-08
Payer: MEDICARE

## 2024-07-08 VITALS
DIASTOLIC BLOOD PRESSURE: 66 MMHG | HEART RATE: 92 BPM | HEIGHT: 59 IN | TEMPERATURE: 97.9 F | SYSTOLIC BLOOD PRESSURE: 114 MMHG | BODY MASS INDEX: 24.8 KG/M2 | RESPIRATION RATE: 20 BRPM | OXYGEN SATURATION: 98 % | WEIGHT: 123 LBS

## 2024-07-08 DIAGNOSIS — J30.89 OTHER ALLERGIC RHINITIS: ICD-10-CM

## 2024-07-08 DIAGNOSIS — R74.8 ELEVATED ALKALINE PHOSPHATASE LEVEL: ICD-10-CM

## 2024-07-08 DIAGNOSIS — I10 ESSENTIAL (PRIMARY) HYPERTENSION: ICD-10-CM

## 2024-07-08 DIAGNOSIS — Z00.00 MEDICARE ANNUAL WELLNESS VISIT, SUBSEQUENT: Primary | ICD-10-CM

## 2024-07-08 DIAGNOSIS — K21.9 GASTROESOPHAGEAL REFLUX DISEASE, UNSPECIFIED WHETHER ESOPHAGITIS PRESENT: ICD-10-CM

## 2024-07-08 DIAGNOSIS — E78.5 HYPERLIPIDEMIA, UNSPECIFIED HYPERLIPIDEMIA TYPE: ICD-10-CM

## 2024-07-08 DIAGNOSIS — M1A.9XX0 CHRONIC GOUT INVOLVING TOE WITHOUT TOPHUS, UNSPECIFIED CAUSE, UNSPECIFIED LATERALITY: ICD-10-CM

## 2024-07-08 PROCEDURE — G8427 DOCREV CUR MEDS BY ELIG CLIN: HCPCS | Performed by: FAMILY MEDICINE

## 2024-07-08 PROCEDURE — 3078F DIAST BP <80 MM HG: CPT | Performed by: FAMILY MEDICINE

## 2024-07-08 PROCEDURE — 4004F PT TOBACCO SCREEN RCVD TLK: CPT | Performed by: FAMILY MEDICINE

## 2024-07-08 PROCEDURE — G8399 PT W/DXA RESULTS DOCUMENT: HCPCS | Performed by: FAMILY MEDICINE

## 2024-07-08 PROCEDURE — G8420 CALC BMI NORM PARAMETERS: HCPCS | Performed by: FAMILY MEDICINE

## 2024-07-08 PROCEDURE — 1090F PRES/ABSN URINE INCON ASSESS: CPT | Performed by: FAMILY MEDICINE

## 2024-07-08 PROCEDURE — 3017F COLORECTAL CA SCREEN DOC REV: CPT | Performed by: FAMILY MEDICINE

## 2024-07-08 PROCEDURE — 99214 OFFICE O/P EST MOD 30 MIN: CPT | Performed by: FAMILY MEDICINE

## 2024-07-08 PROCEDURE — 3074F SYST BP LT 130 MM HG: CPT | Performed by: FAMILY MEDICINE

## 2024-07-08 PROCEDURE — 1123F ACP DISCUSS/DSCN MKR DOCD: CPT | Performed by: FAMILY MEDICINE

## 2024-07-08 PROCEDURE — G0439 PPPS, SUBSEQ VISIT: HCPCS | Performed by: FAMILY MEDICINE

## 2024-07-08 RX ORDER — INDOMETHACIN 25 MG/1
25 CAPSULE ORAL 3 TIMES DAILY PRN
Qty: 90 CAPSULE | Refills: 0 | Status: SHIPPED | OUTPATIENT
Start: 2024-07-08

## 2024-07-08 RX ORDER — MONTELUKAST SODIUM 10 MG/1
10 TABLET ORAL DAILY
Qty: 90 TABLET | Refills: 2 | Status: SHIPPED | OUTPATIENT
Start: 2024-07-08

## 2024-07-08 RX ORDER — IRBESARTAN 300 MG/1
300 TABLET ORAL DAILY
Qty: 90 TABLET | Refills: 2 | Status: SHIPPED | OUTPATIENT
Start: 2024-07-08

## 2024-07-08 RX ORDER — PANTOPRAZOLE SODIUM 20 MG/1
20 TABLET, DELAYED RELEASE ORAL DAILY
Qty: 90 TABLET | Refills: 2 | Status: SHIPPED | OUTPATIENT
Start: 2024-07-08

## 2024-07-08 RX ORDER — HYDROCHLOROTHIAZIDE 12.5 MG/1
CAPSULE, GELATIN COATED ORAL
Qty: 90 CAPSULE | Refills: 2 | Status: SHIPPED | OUTPATIENT
Start: 2024-07-08

## 2024-07-08 RX ORDER — ATORVASTATIN CALCIUM 40 MG/1
40 TABLET, FILM COATED ORAL NIGHTLY
Qty: 90 TABLET | Refills: 0 | Status: SHIPPED | OUTPATIENT
Start: 2024-07-08

## 2024-07-08 ASSESSMENT — LIFESTYLE VARIABLES
HOW MANY STANDARD DRINKS CONTAINING ALCOHOL DO YOU HAVE ON A TYPICAL DAY: 1 OR 2
HOW OFTEN DO YOU HAVE A DRINK CONTAINING ALCOHOL: MONTHLY OR LESS

## 2024-07-08 ASSESSMENT — PATIENT HEALTH QUESTIONNAIRE - PHQ9
SUM OF ALL RESPONSES TO PHQ QUESTIONS 1-9: 0
SUM OF ALL RESPONSES TO PHQ QUESTIONS 1-9: 0
1. LITTLE INTEREST OR PLEASURE IN DOING THINGS: NOT AT ALL
SUM OF ALL RESPONSES TO PHQ QUESTIONS 1-9: 0
SUM OF ALL RESPONSES TO PHQ9 QUESTIONS 1 & 2: 0
2. FEELING DOWN, DEPRESSED OR HOPELESS: NOT AT ALL
SUM OF ALL RESPONSES TO PHQ QUESTIONS 1-9: 0

## 2024-07-08 NOTE — PROGRESS NOTES
Health Decision Maker has been checked with the patient   Primary Decision Maker: Any Mims - Child - [phone]    Supplemental (Other) Decision Maker: Rae Perrylin - Other - [phone]     AI form was signed    Chief Complaint   Patient presents with    Medicare AWV       \"Have you been to the ER, urgent care clinic since your last visit?  Hospitalized since your last visit?\"    NO    “Have you seen or consulted any other health care providers outside of [facility] since your last visit?”    NO      Vitals:    07/08/24 1410   BP: 114/66   Pulse: 92   Resp: 20   Temp: 97.9 °F (36.6 °C)   TempSrc: Oral   SpO2: 98%   Weight: 55.8 kg (123 lb)   Height: 1.499 m (4' 11\")      Depression: Not at risk (7/8/2024)    PHQ-2     PHQ-2 Score: 0              Click Here for Release of Records Request    Chart reviewed: immunizations are documented.   Immunization History   Administered Date(s) Administered    COVID-19, MODERNA BLUE border, Primary or Immunocompromised, (age 12y+), IM, 100 mcg/0.5mL 02/09/2021, 03/10/2021, 11/01/2021    Influenza Virus Vaccine 10/03/2012    Influenza, High Dose (Fluzone 65 yrs and older) 10/19/2021    Pneumococcal, PCV-13, PREVNAR 13, (age 6w+), IM, 0.5mL 10/19/2021    RSV (Respiratory Syncytial Virus), unspecified vaccine or mAB 01/15/2024    Zoster Recombinant (Shingrix) 02/15/2022

## 2024-07-08 NOTE — PROGRESS NOTES
Medicare Annual Wellness Visit    Zoë Mims is here for Medicare AWV    Assessment & Plan   Medicare annual wellness visit, subsequent  Hyperlipidemia, unspecified hyperlipidemia type - Check lipids. Ast/ ALT okay in Feb 2024. Cont Lipitor.   -     Lipid Panel; Future  -     atorvastatin (LIPITOR) 40 MG tablet; Take 1 tablet by mouth nightly, Disp-90 tablet, R-0Normal  Essential (primary) hypertension - BP well controlled. Recent CBC, CMP in Feb 2024 okay. Continue Avapro, HCTZ.   -     irbesartan (AVAPRO) 300 MG tablet; Take 1 tablet by mouth daily, Disp-90 tablet, R-2Normal  -     hydroCHLOROthiazide 12.5 MG capsule; TAKE 1 CAPSULE BY MOUTH EVERY DAY, Disp-90 capsule, R-2Normal  Other allergic rhinitis - Well controlled. Cont Singulair.   -     montelukast (SINGULAIR) 10 MG tablet; Take 1 tablet by mouth daily, Disp-90 tablet, R-2Normal  Gastroesophageal reflux disease, unspecified whether esophagitis present - Well controlled. No red flag symptoms. Cont Protonix.   -     pantoprazole (PROTONIX) 20 MG tablet; Take 1 tablet by mouth daily, Disp-90 tablet, R-2Normal  Chronic gout involving toe without tophus, unspecified cause, unspecified laterality - Stable. Takes Indomethacin PRN. Check uric acid and continue NSAID PRN flare.   -     indomethacin (INDOCIN) 25 MG capsule; Take 1 capsule by mouth 3 times daily as needed (gout flare) Take up to 5 days at a time or stop sooner with resolution of gout flare., Disp-90 capsule, R-0Normal  -     Uric Acid; Future  Elevated alkaline phosphatase level - Recheck Alk phos. If still elevated will need further workup.   -     Alkaline Phosphatase; Future    Recommendations for Preventive Services Due: see orders and patient instructions/AVS.  Recommended screening schedule for the next 5-10 years is provided to the patient in written form: see Patient Instructions/AVS.     No follow-ups on file.     Subjective     HTN - On Irbesartan, HCTZ. No cp, ha, bv, sob.     HLD -

## 2024-07-12 DIAGNOSIS — M1A.9XX0 CHRONIC GOUT INVOLVING TOE WITHOUT TOPHUS, UNSPECIFIED CAUSE, UNSPECIFIED LATERALITY: ICD-10-CM

## 2024-07-12 DIAGNOSIS — R74.8 ELEVATED ALKALINE PHOSPHATASE LEVEL: ICD-10-CM

## 2024-07-12 DIAGNOSIS — E78.5 HYPERLIPIDEMIA, UNSPECIFIED HYPERLIPIDEMIA TYPE: ICD-10-CM

## 2024-07-12 LAB
ALP SERPL-CCNC: 116 U/L (ref 45–117)
CHOLEST SERPL-MCNC: 136 MG/DL
COMMENT:: NORMAL
HDLC SERPL-MCNC: 68 MG/DL
HDLC SERPL: 2 (ref 0–5)
LDLC SERPL CALC-MCNC: 56.6 MG/DL (ref 0–100)
SPECIMEN HOLD: NORMAL
TRIGL SERPL-MCNC: 57 MG/DL
URATE SERPL-MCNC: 5.6 MG/DL (ref 2.6–6)
VLDLC SERPL CALC-MCNC: 11.4 MG/DL

## 2024-08-08 DIAGNOSIS — M1A.9XX0 CHRONIC GOUT INVOLVING TOE WITHOUT TOPHUS, UNSPECIFIED CAUSE, UNSPECIFIED LATERALITY: ICD-10-CM

## 2024-08-08 RX ORDER — INDOMETHACIN 25 MG/1
CAPSULE ORAL
Qty: 30 CAPSULE | Refills: 1 | Status: SHIPPED | OUTPATIENT
Start: 2024-08-08

## 2024-08-08 NOTE — TELEPHONE ENCOUNTER
PCP: Esther Bauer DO    Last Visit 7/8/2024   Future Appointments   Date Time Provider Department Center   8/22/2024  2:00 PM Jose Luis Patterson MD [facility]   1/8/2025  2:00 PM Esther Bauer DO [facility]       Requested Prescriptions     Pending Prescriptions Disp Refills    indomethacin (INDOCIN) 25 MG capsule [Pharmacy Med Name: INDOMETHACIN 25 MG CAPSULE] 90 capsule 0     Sig: TAKE 1 CAPSULE BY MOUTH 3 TIMES DAILY AS NEEDED TAKE UP TO 5 DAYS AT A TIME OR STOP SOONER WITH RESOLUTION OF GOUT FLARE.         Other Comments: Last Refill /7-8-2024, next appt with Dr. Bauer on 1/8/2025

## 2024-10-10 DIAGNOSIS — M1A.9XX0 CHRONIC GOUT INVOLVING TOE WITHOUT TOPHUS, UNSPECIFIED CAUSE, UNSPECIFIED LATERALITY: ICD-10-CM

## 2024-10-11 RX ORDER — INDOMETHACIN 25 MG/1
CAPSULE ORAL
Qty: 30 CAPSULE | Refills: 0 | Status: SHIPPED | OUTPATIENT
Start: 2024-10-11

## 2024-12-08 DIAGNOSIS — E78.5 HYPERLIPIDEMIA, UNSPECIFIED HYPERLIPIDEMIA TYPE: ICD-10-CM

## 2024-12-09 RX ORDER — ATORVASTATIN CALCIUM 40 MG/1
40 TABLET, FILM COATED ORAL NIGHTLY
Qty: 90 TABLET | Refills: 0 | Status: SHIPPED | OUTPATIENT
Start: 2024-12-09

## 2024-12-16 ENCOUNTER — TRANSCRIBE ORDERS (OUTPATIENT)
Facility: HOSPITAL | Age: 69
End: 2024-12-16

## 2024-12-16 DIAGNOSIS — Z12.31 VISIT FOR SCREENING MAMMOGRAM: Primary | ICD-10-CM

## 2025-01-07 SDOH — HEALTH STABILITY: PHYSICAL HEALTH: ON AVERAGE, HOW MANY DAYS PER WEEK DO YOU ENGAGE IN MODERATE TO STRENUOUS EXERCISE (LIKE A BRISK WALK)?: 3 DAYS

## 2025-01-07 SDOH — HEALTH STABILITY: PHYSICAL HEALTH: ON AVERAGE, HOW MANY MINUTES DO YOU ENGAGE IN EXERCISE AT THIS LEVEL?: 30 MIN

## 2025-01-07 ASSESSMENT — LIFESTYLE VARIABLES
HOW MANY STANDARD DRINKS CONTAINING ALCOHOL DO YOU HAVE ON A TYPICAL DAY: 1
HOW OFTEN DO YOU HAVE A DRINK CONTAINING ALCOHOL: 2-4 TIMES A MONTH
HOW MANY STANDARD DRINKS CONTAINING ALCOHOL DO YOU HAVE ON A TYPICAL DAY: 1 OR 2
HOW OFTEN DO YOU HAVE A DRINK CONTAINING ALCOHOL: 3
HOW OFTEN DO YOU HAVE SIX OR MORE DRINKS ON ONE OCCASION: 98

## 2025-01-07 ASSESSMENT — PATIENT HEALTH QUESTIONNAIRE - PHQ9
SUM OF ALL RESPONSES TO PHQ QUESTIONS 1-9: 0
SUM OF ALL RESPONSES TO PHQ9 QUESTIONS 1 & 2: 0
2. FEELING DOWN, DEPRESSED OR HOPELESS: NOT AT ALL
SUM OF ALL RESPONSES TO PHQ QUESTIONS 1-9: 0
1. LITTLE INTEREST OR PLEASURE IN DOING THINGS: NOT AT ALL

## 2025-01-08 ENCOUNTER — OFFICE VISIT (OUTPATIENT)
Dept: PRIMARY CARE CLINIC | Facility: CLINIC | Age: 70
End: 2025-01-08

## 2025-01-08 VITALS
BODY MASS INDEX: 24.8 KG/M2 | TEMPERATURE: 97.8 F | DIASTOLIC BLOOD PRESSURE: 73 MMHG | SYSTOLIC BLOOD PRESSURE: 130 MMHG | HEIGHT: 59 IN | WEIGHT: 123 LBS | HEART RATE: 97 BPM | RESPIRATION RATE: 20 BRPM | OXYGEN SATURATION: 99 %

## 2025-01-08 DIAGNOSIS — I10 ESSENTIAL (PRIMARY) HYPERTENSION: ICD-10-CM

## 2025-01-08 DIAGNOSIS — M1A.9XX0 CHRONIC GOUT INVOLVING TOE WITHOUT TOPHUS, UNSPECIFIED CAUSE, UNSPECIFIED LATERALITY: ICD-10-CM

## 2025-01-08 DIAGNOSIS — K21.9 GASTROESOPHAGEAL REFLUX DISEASE, UNSPECIFIED WHETHER ESOPHAGITIS PRESENT: ICD-10-CM

## 2025-01-08 DIAGNOSIS — J30.89 OTHER ALLERGIC RHINITIS: ICD-10-CM

## 2025-01-08 LAB
ALBUMIN SERPL-MCNC: 4.2 G/DL (ref 3.5–5)
ALBUMIN/GLOB SERPL: 1.4 (ref 1.1–2.2)
ALP SERPL-CCNC: 122 U/L (ref 45–117)
ALT SERPL-CCNC: 22 U/L (ref 12–78)
ANION GAP SERPL CALC-SCNC: 4 MMOL/L (ref 2–12)
AST SERPL-CCNC: 17 U/L (ref 15–37)
BILIRUB SERPL-MCNC: 0.3 MG/DL (ref 0.2–1)
BUN SERPL-MCNC: 15 MG/DL (ref 6–20)
BUN/CREAT SERPL: 19 (ref 12–20)
CALCIUM SERPL-MCNC: 9.1 MG/DL (ref 8.5–10.1)
CHLORIDE SERPL-SCNC: 105 MMOL/L (ref 97–108)
CO2 SERPL-SCNC: 28 MMOL/L (ref 21–32)
CREAT SERPL-MCNC: 0.8 MG/DL (ref 0.55–1.02)
ERYTHROCYTE [DISTWIDTH] IN BLOOD BY AUTOMATED COUNT: 13.1 % (ref 11.5–14.5)
GLOBULIN SER CALC-MCNC: 3 G/DL (ref 2–4)
GLUCOSE SERPL-MCNC: 100 MG/DL (ref 65–100)
HCT VFR BLD AUTO: 38.5 % (ref 35–47)
HGB BLD-MCNC: 13 G/DL (ref 11.5–16)
MCH RBC QN AUTO: 33.2 PG (ref 26–34)
MCHC RBC AUTO-ENTMCNC: 33.8 G/DL (ref 30–36.5)
MCV RBC AUTO: 98.2 FL (ref 80–99)
NRBC # BLD: 0 K/UL (ref 0–0.01)
NRBC BLD-RTO: 0 PER 100 WBC
PLATELET # BLD AUTO: 217 K/UL (ref 150–400)
PMV BLD AUTO: 10.2 FL (ref 8.9–12.9)
POTASSIUM SERPL-SCNC: 4.4 MMOL/L (ref 3.5–5.1)
PROT SERPL-MCNC: 7.2 G/DL (ref 6.4–8.2)
RBC # BLD AUTO: 3.92 M/UL (ref 3.8–5.2)
SODIUM SERPL-SCNC: 137 MMOL/L (ref 136–145)
URATE SERPL-MCNC: 5.2 MG/DL (ref 2.6–6)
WBC # BLD AUTO: 7 K/UL (ref 3.6–11)

## 2025-01-08 RX ORDER — HYDROCHLOROTHIAZIDE 12.5 MG/1
CAPSULE ORAL
Qty: 90 CAPSULE | Refills: 1 | Status: SHIPPED | OUTPATIENT
Start: 2025-01-08

## 2025-01-08 RX ORDER — IRBESARTAN 300 MG/1
300 TABLET ORAL DAILY
Qty: 90 TABLET | Refills: 1 | Status: SHIPPED | OUTPATIENT
Start: 2025-01-08

## 2025-01-08 RX ORDER — INDOMETHACIN 25 MG/1
CAPSULE ORAL
Qty: 30 CAPSULE | Refills: 2 | Status: SHIPPED | OUTPATIENT
Start: 2025-01-08

## 2025-01-08 RX ORDER — MONTELUKAST SODIUM 10 MG/1
10 TABLET ORAL DAILY
Qty: 90 TABLET | Refills: 1 | Status: SHIPPED | OUTPATIENT
Start: 2025-01-08

## 2025-01-08 SDOH — ECONOMIC STABILITY: FOOD INSECURITY: WITHIN THE PAST 12 MONTHS, THE FOOD YOU BOUGHT JUST DIDN'T LAST AND YOU DIDN'T HAVE MONEY TO GET MORE.: NEVER TRUE

## 2025-01-08 SDOH — ECONOMIC STABILITY: FOOD INSECURITY: WITHIN THE PAST 12 MONTHS, YOU WORRIED THAT YOUR FOOD WOULD RUN OUT BEFORE YOU GOT MONEY TO BUY MORE.: NEVER TRUE

## 2025-01-08 NOTE — PROGRESS NOTES
Health Decision Maker has been checked with the patient   Primary Decision Maker: Any Mims - Child - [phone]    Supplemental (Other) Decision Maker: Antoni Perryquelin - Other - [phone]     AI form was signed    Chief Complaint   Patient presents with    Medicare AWV       \"Have you been to the ER, urgent care clinic since your last visit?  Hospitalized since your last visit?\"    NO    “Have you seen or consulted any other health care providers outside of [facility] since your last visit?”    NO      Vitals:    01/08/25 1347   Weight: 55.8 kg (123 lb)   Height: 1.499 m (4' 11\")      Depression: Not at risk (1/7/2025)    PHQ-2     PHQ-2 Score: 0              Click Here for Release of Records Request    Chart reviewed: immunizations are documented.   Immunization History   Administered Date(s) Administered    COVID-19, MODERNA BLUE border, Primary or Immunocompromised, (age 12y+), IM, 100 mcg/0.5mL 02/09/2021, 03/10/2021, 11/01/2021    Influenza Virus Vaccine 10/03/2012    Influenza, FLUZONE High Dose, (age 65 y+), IM, Trivalent PF, 0.5mL 10/19/2021    Pneumococcal, PCV-13, PREVNAR 13, (age 6w+), IM, 0.5mL 10/19/2021    RSV (Respiratory Syncytial Virus), unspecified vaccine or mAB 01/15/2024    Zoster Recombinant (Shingrix) 02/15/2022      
Dispense: 30 capsule; Refill: 2    3. Other allergic rhinitis  - montelukast (SINGULAIR) 10 MG tablet; Take 1 tablet by mouth daily  Dispense: 90 tablet; Refill: 1    4. Gastroesophageal reflux disease, unspecified whether esophagitis present     Follow up in July 2025 for AMW.     I have discussed the diagnosis with the patient and the intended plan as seen in the above orders. The patient has received an after-visit summary and questions were answered concerning future plans.  I have discussed medication side effects and warnings with the patient as well.    Aydee Bauer,

## 2025-01-08 NOTE — PATIENT INSTRUCTIONS
berries.     Foods high in fiber can reduce your cholesterol and provide important vitamins and minerals. High-fiber foods include whole-grain cereals and breads, oatmeal, beans, brown rice, citrus fruits, and apples.     Eat lean proteins. Heart-healthy proteins include seafood, lean meats and poultry, eggs, beans, peas, nuts, seeds, and soy products.     Limit drinks and foods with added sugar. These include candy, desserts, and soda pop.   Heart-healthy lifestyle    If your doctor recommends it, get more exercise. For many people, walking is a good choice. Or you may want to swim, bike, or do other activities. Bit by bit, increase the time you're active every day. Try for at least 30 minutes on most days of the week.     Try to quit or cut back on using tobacco and other nicotine products. This includes smoking and vaping. If you need help quitting, talk to your doctor about stop-smoking programs and medicines. These can increase your chances of quitting for good. Quitting is one of the most important things you can do to protect your heart. It is never too late to quit. Try to avoid secondhand smoke too.     Stay at a weight that's healthy for you. Talk to your doctor if you need help losing weight.     Try to get 7 to 9 hours of sleep each night.     Limit alcohol to 2 drinks a day for men and 1 drink a day for women. Too much alcohol can cause health problems.     Manage other health problems such as diabetes, high blood pressure, and high cholesterol. If you think you may have a problem with alcohol or drug use, talk to your doctor.   Medicines    Take your medicines exactly as prescribed. Call your doctor if you think you are having a problem with your medicine.     If your doctor recommends aspirin, take the amount directed each day. Make sure you take aspirin and not another kind of pain reliever, such as acetaminophen (Tylenol).   When should you call for help?   Call 911 if you have symptoms of a heart

## 2025-01-09 DIAGNOSIS — R74.8 ELEVATED ALKALINE PHOSPHATASE LEVEL: Primary | ICD-10-CM

## 2025-01-15 DIAGNOSIS — R74.8 ELEVATED ALKALINE PHOSPHATASE LEVEL: ICD-10-CM

## 2025-01-16 LAB — GGT SERPL-CCNC: 56 U/L (ref 5–55)

## 2025-01-17 LAB — ALP SERPL-CCNC: 109 IU/L (ref 44–121)

## 2025-01-17 RX ORDER — MELOXICAM 7.5 MG/1
TABLET ORAL
Qty: 30 TABLET | Refills: 0 | Status: SHIPPED | OUTPATIENT
Start: 2025-01-17

## 2025-01-18 DIAGNOSIS — E78.5 HYPERLIPIDEMIA, UNSPECIFIED HYPERLIPIDEMIA TYPE: ICD-10-CM

## 2025-01-20 DIAGNOSIS — R74.8 ELEVATED ALKALINE PHOSPHATASE LEVEL: Primary | ICD-10-CM

## 2025-01-20 DIAGNOSIS — R74.8 ELEVATED SERUM GGT LEVEL: ICD-10-CM

## 2025-01-20 RX ORDER — ATORVASTATIN CALCIUM 40 MG/1
40 TABLET, FILM COATED ORAL NIGHTLY
Qty: 90 TABLET | Refills: 1 | Status: SHIPPED | OUTPATIENT
Start: 2025-01-20

## 2025-01-22 ENCOUNTER — HOSPITAL ENCOUNTER (OUTPATIENT)
Facility: HOSPITAL | Age: 70
Discharge: HOME | End: 2025-01-25
Payer: MEDICARE

## 2025-01-22 DIAGNOSIS — R74.8 ELEVATED ALKALINE PHOSPHATASE LEVEL: ICD-10-CM

## 2025-01-22 DIAGNOSIS — R74.8 ELEVATED SERUM GGT LEVEL: ICD-10-CM

## 2025-01-22 PROCEDURE — 76700 US EXAM ABDOM COMPLETE: CPT

## 2025-01-26 RX ORDER — ALBUTEROL SULFATE 90 UG/1
2 INHALANT RESPIRATORY (INHALATION) EVERY 6 HOURS PRN
Qty: 18 G | Refills: 1 | Status: SHIPPED | OUTPATIENT
Start: 2025-01-26

## 2025-01-28 ENCOUNTER — TELEPHONE (OUTPATIENT)
Dept: PRIMARY CARE CLINIC | Facility: CLINIC | Age: 70
End: 2025-01-28

## 2025-01-28 LAB
ALP BONE CFR SERPL: 21 % (ref 14–68)
ALP INTEST CFR SERPL: 13 % (ref 0–18)
ALP LIVER CFR SERPL: 66 % (ref 18–85)
ALP SERPL-CCNC: 109 IU/L (ref 44–121)

## 2025-01-28 NOTE — TELEPHONE ENCOUNTER
Tried reaching out to Dr Pruett office at [facility] Phone: [phone]    Unable to get nurse on phone. Patients US Abdomen came back - we have faxed the results to this office as well at Fax: [phone]

## 2025-02-17 ENCOUNTER — HOSPITAL ENCOUNTER (OUTPATIENT)
Facility: HOSPITAL | Age: 70
Discharge: HOME | End: 2025-02-20
Payer: MEDICARE

## 2025-02-17 DIAGNOSIS — Z12.31 VISIT FOR SCREENING MAMMOGRAM: ICD-10-CM

## 2025-02-17 PROCEDURE — 77063 BREAST TOMOSYNTHESIS BI: CPT

## 2025-02-26 ENCOUNTER — TRANSCRIBE ORDERS (OUTPATIENT)
Facility: HOSPITAL | Age: 70
End: 2025-02-26

## 2025-02-26 DIAGNOSIS — K83.8 COMMON BILE DUCT DILATATION: Primary | ICD-10-CM

## 2025-02-26 DIAGNOSIS — K76.0 FATTY LIVER: ICD-10-CM

## 2025-02-26 DIAGNOSIS — R74.8 ACID PHOSPHATASE ELEVATED: ICD-10-CM

## 2025-02-26 DIAGNOSIS — R16.0 HEPATOMEGALY: ICD-10-CM

## 2025-03-11 ENCOUNTER — HOSPITAL ENCOUNTER (OUTPATIENT)
Facility: HOSPITAL | Age: 70
Discharge: HOME | End: 2025-03-14
Attending: INTERNAL MEDICINE
Payer: MEDICARE

## 2025-03-11 DIAGNOSIS — R74.8 ACID PHOSPHATASE ELEVATED: ICD-10-CM

## 2025-03-11 DIAGNOSIS — K76.0 FATTY LIVER: ICD-10-CM

## 2025-03-11 DIAGNOSIS — R16.0 HEPATOMEGALY: ICD-10-CM

## 2025-03-11 DIAGNOSIS — K83.8 COMMON BILE DUCT DILATATION: ICD-10-CM

## 2025-03-11 PROCEDURE — 6360000004 HC RX CONTRAST MEDICATION: Performed by: RADIOLOGY

## 2025-03-11 PROCEDURE — 74183 MRI ABD W/O CNTR FLWD CNTR: CPT

## 2025-03-11 PROCEDURE — A9575 INJ GADOTERATE MEGLUMI 0.1ML: HCPCS | Performed by: RADIOLOGY

## 2025-03-11 RX ORDER — GADOTERATE MEGLUMINE 376.9 MG/ML
10 INJECTION INTRAVENOUS
Status: COMPLETED | OUTPATIENT
Start: 2025-03-11 | End: 2025-03-11

## 2025-03-11 RX ADMIN — GADOTERATE MEGLUMINE 10 ML: 376.9 INJECTION INTRAVENOUS at 15:37

## 2025-03-13 LAB — MITOCHONDRIA M2 IGG SER-ACNC: <20 UNITS (ref 0–20)

## 2025-04-08 ENCOUNTER — TELEPHONE (OUTPATIENT)
Dept: PRIMARY CARE CLINIC | Facility: CLINIC | Age: 70
End: 2025-04-08

## 2025-04-08 NOTE — TELEPHONE ENCOUNTER
Patient woke up this morning with a red line in her leg going from her groin area all the way down her leg, about a foot long.  She is worried and wants to know if she should be seen sooner than April 21st.  Please call.

## 2025-04-08 NOTE — TELEPHONE ENCOUNTER
Called patient. Patient stated she got out of the shower this morning and noticed a red line going down her leg, near her vein. She stated she is not having pain or warm to touch in this area. She went to her sisters house to have her take a look at it as well, but when she arrived the line was then gone.     I scheduled her an appointment for tomorrow at 11:15am as this was an open available slot. I advised her to give us a call back today if the line returns with any other symptoms associated like warm, swollen, or any pain.     Patient verbalized understanding and stated she will keep an eye on this today.

## 2025-04-09 ENCOUNTER — OFFICE VISIT (OUTPATIENT)
Dept: PRIMARY CARE CLINIC | Facility: CLINIC | Age: 70
End: 2025-04-09
Payer: MEDICARE

## 2025-04-09 VITALS
RESPIRATION RATE: 18 BRPM | WEIGHT: 127.4 LBS | HEIGHT: 59 IN | OXYGEN SATURATION: 97 % | BODY MASS INDEX: 25.68 KG/M2 | SYSTOLIC BLOOD PRESSURE: 136 MMHG | DIASTOLIC BLOOD PRESSURE: 83 MMHG | HEART RATE: 84 BPM | TEMPERATURE: 97.9 F

## 2025-04-09 DIAGNOSIS — R74.8 ELEVATED ALKALINE PHOSPHATASE LEVEL: ICD-10-CM

## 2025-04-09 DIAGNOSIS — Z71.1 CONCERN ABOUT SKIN DISEASE WITHOUT DIAGNOSIS: Primary | ICD-10-CM

## 2025-04-09 PROCEDURE — 3017F COLORECTAL CA SCREEN DOC REV: CPT | Performed by: FAMILY MEDICINE

## 2025-04-09 PROCEDURE — 1123F ACP DISCUSS/DSCN MKR DOCD: CPT | Performed by: FAMILY MEDICINE

## 2025-04-09 PROCEDURE — 4004F PT TOBACCO SCREEN RCVD TLK: CPT | Performed by: FAMILY MEDICINE

## 2025-04-09 PROCEDURE — 1090F PRES/ABSN URINE INCON ASSESS: CPT | Performed by: FAMILY MEDICINE

## 2025-04-09 PROCEDURE — 1126F AMNT PAIN NOTED NONE PRSNT: CPT | Performed by: FAMILY MEDICINE

## 2025-04-09 PROCEDURE — 1159F MED LIST DOCD IN RCRD: CPT | Performed by: FAMILY MEDICINE

## 2025-04-09 PROCEDURE — 3079F DIAST BP 80-89 MM HG: CPT | Performed by: FAMILY MEDICINE

## 2025-04-09 PROCEDURE — G8419 CALC BMI OUT NRM PARAM NOF/U: HCPCS | Performed by: FAMILY MEDICINE

## 2025-04-09 PROCEDURE — 99213 OFFICE O/P EST LOW 20 MIN: CPT | Performed by: FAMILY MEDICINE

## 2025-04-09 PROCEDURE — G8399 PT W/DXA RESULTS DOCUMENT: HCPCS | Performed by: FAMILY MEDICINE

## 2025-04-09 PROCEDURE — 3075F SYST BP GE 130 - 139MM HG: CPT | Performed by: FAMILY MEDICINE

## 2025-04-09 PROCEDURE — 1160F RVW MEDS BY RX/DR IN RCRD: CPT | Performed by: FAMILY MEDICINE

## 2025-04-09 PROCEDURE — G8427 DOCREV CUR MEDS BY ELIG CLIN: HCPCS | Performed by: FAMILY MEDICINE

## 2025-04-09 NOTE — PROGRESS NOTES
Health Decision Maker has been checked with the patient   Primary Decision Maker: Any Mims - [phone]    Supplemental (Other) Decision Maker: Ericka Perry - [phone]     AI form was signed    Chief Complaint   Patient presents with    Follow-up     Patient noticed a bright red vessel that appeared after patient took shower on Monday. She describes it as if it could have exploded       \"Have you been to the ER, urgent care clinic since your last visit?  Hospitalized since your last visit?\"    NO    “Have you seen or consulted any other health care providers outside of [facility] since your last visit?”    NO      Vitals:    04/09/25 1109   Resp: 18   Temp: 97.9 °F (36.6 °C)   TempSrc: Oral   SpO2: 97%   Weight: 57.8 kg (127 lb 6.4 oz)   Height: 1.499 m (4' 11\")      Depression: Not at risk (1/7/2025)    PHQ-2     PHQ-2 Score: 0              Click Here for Release of Records Request    Chart reviewed: immunizations are documented.   Immunization History   Administered Date(s) Administered    COVID-19, MODERNA BLUE border, Primary or Immunocompromised, (age 12y+), IM, 100 mcg/0.5mL 02/09/2021, 03/10/2021, 11/01/2021    Influenza Virus Vaccine 10/03/2012    Influenza, FLUZONE High Dose (age 65 y+), IM, Quadv, 0.7mL 10/03/2024    Influenza, FLUZONE High Dose, (age 65 y+), IM, Trivalent PF, 0.5mL 10/19/2021    Pneumococcal, PCV-13, PREVNAR 13, (age 6w+), IM, 0.5mL 10/19/2021    RSV (Respiratory Syncytial Virus), unspecified vaccine or mAB 01/15/2024    Zoster Recombinant (Shingrix) 02/15/2022    Health Decision Maker has been checked with the patient   Primary Decision Maker: Any Mims - [phone]    Supplemental (Other) Decision Maker: Ericka Perry - [phone]   
Hepatomegaly and gallbladder sludge  - Previous ultrasound showed hepatomegaly and gallbladder sludge  - Personally reviewed MRI abdomen completed March 2025 showed hepatic steatosis and indicated elevated common bile duct  - Follow-up appointment scheduled for 06/2025  - Advised to remain active       I have discussed the diagnosis with the patient and the intended plan as seen in the above orders. The patient has received an after-visit summary and questions were answered concerning future plans.  I have discussed medication side effects and warnings with the patient as well.    The patient (or guardian, if applicable) and other individuals in attendance with the patient were advised that Artificial Intelligence will be utilized during this visit to record and process the conversation to generate a clinical note. The patient (or guardian, if applicable) and other individuals in attendance at the appointment consented to the use of AI, including the recording.       Aydee Bauer DO

## 2025-07-10 ENCOUNTER — OFFICE VISIT (OUTPATIENT)
Dept: PRIMARY CARE CLINIC | Facility: CLINIC | Age: 70
End: 2025-07-10

## 2025-07-10 VITALS
HEART RATE: 86 BPM | RESPIRATION RATE: 18 BRPM | OXYGEN SATURATION: 98 % | BODY MASS INDEX: 24.88 KG/M2 | TEMPERATURE: 97.9 F | WEIGHT: 123.4 LBS | SYSTOLIC BLOOD PRESSURE: 109 MMHG | DIASTOLIC BLOOD PRESSURE: 66 MMHG | HEIGHT: 59 IN

## 2025-07-10 DIAGNOSIS — R63.8 OTHER SYMPTOMS AND SIGNS CONCERNING FOOD AND FLUID INTAKE: ICD-10-CM

## 2025-07-10 DIAGNOSIS — Z78.9 TAKES IRON SUPPLEMENTS: ICD-10-CM

## 2025-07-10 DIAGNOSIS — M65.332 TRIGGER MIDDLE FINGER OF LEFT HAND: ICD-10-CM

## 2025-07-10 DIAGNOSIS — E78.5 HYPERLIPIDEMIA, UNSPECIFIED HYPERLIPIDEMIA TYPE: ICD-10-CM

## 2025-07-10 DIAGNOSIS — Z12.4 SCREENING FOR CERVICAL CANCER: ICD-10-CM

## 2025-07-10 DIAGNOSIS — Z87.891 PERSONAL HISTORY OF TOBACCO USE, PRESENTING HAZARDS TO HEALTH: ICD-10-CM

## 2025-07-10 DIAGNOSIS — Z00.00 MEDICARE ANNUAL WELLNESS VISIT, SUBSEQUENT: Primary | ICD-10-CM

## 2025-07-10 RX ORDER — ATORVASTATIN CALCIUM 40 MG/1
40 TABLET, FILM COATED ORAL NIGHTLY
Qty: 90 TABLET | Refills: 0 | Status: SHIPPED | OUTPATIENT
Start: 2025-07-10

## 2025-07-10 ASSESSMENT — PATIENT HEALTH QUESTIONNAIRE - PHQ9
2. FEELING DOWN, DEPRESSED OR HOPELESS: NOT AT ALL
SUM OF ALL RESPONSES TO PHQ QUESTIONS 1-9: 0
SUM OF ALL RESPONSES TO PHQ QUESTIONS 1-9: 0
1. LITTLE INTEREST OR PLEASURE IN DOING THINGS: NOT AT ALL
SUM OF ALL RESPONSES TO PHQ QUESTIONS 1-9: 0
SUM OF ALL RESPONSES TO PHQ QUESTIONS 1-9: 0

## 2025-07-10 ASSESSMENT — LIFESTYLE VARIABLES
HOW OFTEN DO YOU HAVE A DRINK CONTAINING ALCOHOL: MONTHLY OR LESS
HOW MANY STANDARD DRINKS CONTAINING ALCOHOL DO YOU HAVE ON A TYPICAL DAY: 1 OR 2

## 2025-07-10 NOTE — PATIENT INSTRUCTIONS
make it hard to quit, but you can do it. Your doctor will help you set up the plan that best meets your needs.    You will miss the nicotine at first. You may feel short-tempered and grumpy. You may have trouble sleeping or thinking clearly. The urge to use tobacco may continue for a time.   Combining tools can raise your chances of success. You can use medicine along with counseling. And you can join a quit-tobacco program, such as the American Lung Association's Freedom from Smoking program.     Get support.  Reach out to family and friends, and find a counselor to help you quit. Join a support group, such as Nicotine Anonymous. Go to www.smokefree.gov to sign up for text messaging support.     Talk to your doctor or pharmacist about medicines that can help you quit.  Medicines can help with cravings and withdrawal symptoms. There are several over-the-counter choices, such as nicotine patches, gum, and lozenges.     After you quit, do not use tobacco again, not even once.  Get rid of all tobacco products and anything that reminds you of tobacco, such as ashtrays.     Avoid things that make you reach for tobacco.  Change your daily routine. Take a different route to work, or eat a meal in a different place.     Try to cut down on stress.  Find ways to calm yourself, such as taking a hot bath or doing deep breathing exercises.     Eat a healthy diet, and get regular exercise.  Having healthy habits may help you quit using tobacco.     Don't give up on quitting if you use tobacco again.  Most people quit and restart a few times before they quit for good.   Follow-up care is a key part of your treatment and safety. Be sure to make and go to all appointments, and call your doctor if you are having problems. It's also a good idea to know your test results and keep a list of the medicines you take.  Where can you learn more?  Go to https://www.healthwise.net/patientEd and enter Y522 to learn more about \"Quitting Tobacco:

## 2025-07-10 NOTE — PROGRESS NOTES
Health Decision Maker has been checked with the patient   Primary Decision Maker: Any Mims - Child - [phone]    Supplemental (Other) Decision Maker: Antoni Perryquelin - Other - [phone]     AI form was signed    Chief Complaint   Patient presents with    Medicare AWV       \"Have you been to the ER, urgent care clinic since your last visit?  Hospitalized since your last visit?\"    NO    “Have you seen or consulted any other health care providers outside of [facility] since your last visit?”    NO      Vitals:    07/10/25 0753   Weight: 56 kg (123 lb 6.4 oz)      Depression: Not at risk (7/10/2025)    PHQ-2     PHQ-2 Score: 0              Click Here for Release of Records Request    Chart reviewed: immunizations are documented.   Immunization History   Administered Date(s) Administered    COVID-19, MODERNA BLUE border, Primary or Immunocompromised, (age 12y+), IM, 100 mcg/0.5mL 02/09/2021, 03/10/2021, 11/01/2021    Influenza Virus Vaccine 10/03/2012    Influenza, FLUZONE High Dose (age 65 y+), IM, Quadv, 0.7mL 10/03/2024    Influenza, FLUZONE High Dose, (age 65 y+), IM, Trivalent PF, 0.5mL 10/19/2021    Pneumococcal, PCV-13, PREVNAR 13, (age 6w+), IM, 0.5mL 10/19/2021    RSV (Respiratory Syncytial Virus), unspecified vaccine or mAB 01/15/2024    Zoster Recombinant (Shingrix) 02/15/2022      
PAP 2022, NIL no HPV testing, was told she did not need any more by GYN and would like to continue screening    Positive Risk Factor Screenings with Interventions:             General HRA Questions:  Select all that apply: (!) New or Increased Pain (trigger finger)  Interventions - Pain:  Had surgery for trigger finger in one of her other fingers before. Has had it twice. States it feels like the same symptoms. When she went to open something she had to manual extend the finger. States it feels like it is clicking or catching on L middle finger.             Safety:  Do you have non-slip mats or non-slip surfaces or shower bars or grab bars in your shower or bathtub?: (!) No  Interventions:  No concerns for falling       Tobacco Use:    Tobacco Use      Smoking status: Every Day        Packs/day: 0.50        Types: Cigarettes      Smokeless tobacco: Never     Interventions:  Not ready to quit. Discussed risks of smoking including increased risk of cancer, poor dentition, heart disease, etc. Counseled on smoking cessation and benefit of quitting.     Tobacco Use Counseling: Patient was counseled on tobacco cessation. Based upon patient's motivation to change her behavior, the following plan was agreed upon: Patient is not ready to work toward tobacco cessation at this time. Educational materials regarding tobacco cessation were provided. I spent 3+ minutes counseling patient.                   Objective   Vitals:    07/10/25 0753   BP: 109/66   Pulse: 86   Resp: 18   Temp: 97.9 °F (36.6 °C)   TempSrc: Oral   SpO2: 98%   Weight: 56 kg (123 lb 6.4 oz)   Height: 1.499 m (4' 11\")      Body mass index is 24.92 kg/m².        WNWD, NAD  RRR, no murmur  CTA, no wheezes/ ronchi/ rales  Abd soft, nttp  Tms nml, pharynx nml, nose nml  No cervical LAD  No edema  No focal deficits  Mood/ affect nml   She is not able to fully flex the left middle finger, she has tenderness to palpation of flexor tendon of this finger but no

## 2025-07-12 LAB
ALBUMIN SERPL-MCNC: 4.2 G/DL (ref 3.5–5)
ALBUMIN/GLOB SERPL: 1.4 (ref 1.1–2.2)
ALP SERPL-CCNC: 103 U/L (ref 45–117)
ALT SERPL-CCNC: 18 U/L (ref 12–78)
ANION GAP SERPL CALC-SCNC: 5 MMOL/L (ref 2–12)
AST SERPL-CCNC: 12 U/L (ref 15–37)
BILIRUB SERPL-MCNC: 0.7 MG/DL (ref 0.2–1)
BUN SERPL-MCNC: 9 MG/DL (ref 6–20)
BUN/CREAT SERPL: 12 (ref 12–20)
CALCIUM SERPL-MCNC: 9.2 MG/DL (ref 8.5–10.1)
CHLORIDE SERPL-SCNC: 107 MMOL/L (ref 97–108)
CHOLEST SERPL-MCNC: 147 MG/DL
CO2 SERPL-SCNC: 28 MMOL/L (ref 21–32)
CREAT SERPL-MCNC: 0.78 MG/DL (ref 0.55–1.02)
ERYTHROCYTE [DISTWIDTH] IN BLOOD BY AUTOMATED COUNT: 13 % (ref 11.5–14.5)
FERRITIN SERPL-MCNC: 150 NG/ML (ref 26–388)
GLOBULIN SER CALC-MCNC: 2.9 G/DL (ref 2–4)
GLUCOSE SERPL-MCNC: 86 MG/DL (ref 65–100)
HCT VFR BLD AUTO: 35.8 % (ref 35–47)
HDLC SERPL-MCNC: 70 MG/DL
HDLC SERPL: 2.1 (ref 0–5)
HGB BLD-MCNC: 12.8 G/DL (ref 11.5–16)
IRON SATN MFR SERPL: 30 % (ref 20–50)
IRON SERPL-MCNC: 124 UG/DL (ref 35–150)
LDLC SERPL CALC-MCNC: 62 MG/DL (ref 0–100)
MCH RBC QN AUTO: 36.8 PG (ref 26–34)
MCHC RBC AUTO-ENTMCNC: 35.8 G/DL (ref 30–36.5)
MCV RBC AUTO: 102.9 FL (ref 80–99)
NRBC # BLD: 0 K/UL (ref 0–0.01)
NRBC BLD-RTO: 0 PER 100 WBC
PLATELET # BLD AUTO: 214 K/UL (ref 150–400)
PMV BLD AUTO: 10.1 FL (ref 8.9–12.9)
POTASSIUM SERPL-SCNC: 4.1 MMOL/L (ref 3.5–5.1)
PROT SERPL-MCNC: 7.1 G/DL (ref 6.4–8.2)
RBC # BLD AUTO: 3.48 M/UL (ref 3.8–5.2)
SODIUM SERPL-SCNC: 140 MMOL/L (ref 136–145)
TIBC SERPL-MCNC: 420 UG/DL (ref 250–450)
TRIGL SERPL-MCNC: 75 MG/DL
VLDLC SERPL CALC-MCNC: 15 MG/DL
WBC # BLD AUTO: 9.3 K/UL (ref 3.6–11)

## 2025-07-14 ENCOUNTER — RESULTS FOLLOW-UP (OUTPATIENT)
Dept: PRIMARY CARE CLINIC | Facility: CLINIC | Age: 70
End: 2025-07-14

## 2025-07-28 DIAGNOSIS — D75.89 MACROCYTOSIS: Primary | ICD-10-CM

## 2025-09-02 ENCOUNTER — OFFICE VISIT (OUTPATIENT)
Age: 70
End: 2025-09-02

## 2025-09-02 VITALS
WEIGHT: 121 LBS | BODY MASS INDEX: 24.39 KG/M2 | RESPIRATION RATE: 16 BRPM | DIASTOLIC BLOOD PRESSURE: 75 MMHG | OXYGEN SATURATION: 98 % | HEART RATE: 92 BPM | SYSTOLIC BLOOD PRESSURE: 117 MMHG | TEMPERATURE: 98.5 F | HEIGHT: 59 IN

## 2025-09-02 DIAGNOSIS — J06.9 VIRAL UPPER RESPIRATORY INFECTION: Primary | ICD-10-CM

## 2025-09-02 DIAGNOSIS — R68.89 FLU-LIKE SYMPTOMS: ICD-10-CM

## 2025-09-02 LAB
Lab: NORMAL
PERFORMING INSTRUMENT: NORMAL
QC PASS/FAIL: NORMAL
SARS-COV-2, POC: NORMAL

## 2025-09-02 RX ORDER — GUAIFENESIN 600 MG/1
600 TABLET, EXTENDED RELEASE ORAL 2 TIMES DAILY
Qty: 30 TABLET | Refills: 0 | Status: SHIPPED | OUTPATIENT
Start: 2025-09-02 | End: 2025-09-17

## 2025-09-02 RX ORDER — IPRATROPIUM BROMIDE 21 UG/1
2 SPRAY, METERED NASAL EVERY 12 HOURS
Qty: 30 ML | Refills: 0 | Status: SHIPPED | OUTPATIENT
Start: 2025-09-02

## 2025-09-02 ASSESSMENT — ENCOUNTER SYMPTOMS
COUGH: 1
EYES NEGATIVE: 1
ALLERGIC/IMMUNOLOGIC NEGATIVE: 1
GASTROINTESTINAL NEGATIVE: 1

## 2025-09-04 DIAGNOSIS — I10 ESSENTIAL (PRIMARY) HYPERTENSION: ICD-10-CM

## 2025-09-05 ENCOUNTER — TELEPHONE (OUTPATIENT)
Dept: PRIMARY CARE CLINIC | Facility: CLINIC | Age: 70
End: 2025-09-05

## 2025-09-07 RX ORDER — HYDROCHLOROTHIAZIDE 12.5 MG/1
CAPSULE ORAL DAILY
Qty: 90 CAPSULE | Refills: 3 | Status: SHIPPED | OUTPATIENT
Start: 2025-09-07

## (undated) DEVICE — VISUALIZATION SYSTEM: Brand: CLEARIFY

## (undated) DEVICE — SUTURE MCRYL SZ 4-0 L27IN ABSRB UD L19MM PS-2 1/2 CIR PRIM Y426H

## (undated) DEVICE — REM POLYHESIVE ADULT PATIENT RETURN ELECTRODE: Brand: VALLEYLAB

## (undated) DEVICE — DRAPE SURG W41XL74IN CLR FULL SZ C ARM 3 ADH POLY STRP E

## (undated) DEVICE — SCRUB DRY SURG EZ SCRUB BRUSH PREOPERATIVE GRN

## (undated) DEVICE — Device

## (undated) DEVICE — STERILE POLYISOPRENE POWDER-FREE SURGICAL GLOVES: Brand: PROTEXIS

## (undated) DEVICE — SYR 20ML LL STRL LF --

## (undated) DEVICE — ERASECAUTI INTERMIT TRAY: Brand: MEDLINE INDUSTRIES, INC.

## (undated) DEVICE — BLADE ELECTRODE: Brand: EDGE

## (undated) DEVICE — COVER LT HNDL PLAS RIG 1 PER PK

## (undated) DEVICE — AGENT HEMSTAT W4XL4IN OXIDIZED REGENERATED CELOS STRUCTURED

## (undated) DEVICE — BLADELESS OBTURATOR: Brand: WECK VISTA

## (undated) DEVICE — NON-WOVEN SPONGES,6 PLY: Brand: DERMACEA

## (undated) DEVICE — PREP SKN PREVAIL 40ML APPL --

## (undated) DEVICE — BLADE SAW W11.2XL77.5MM THK0.76MM CUT THK1.17MM REPL RECIP

## (undated) DEVICE — NEEDLE HYPO 22GA L1.5IN BLK S STL HUB POLYPR SHLD REG BVL

## (undated) DEVICE — TOTAL TRAY, 16FR 10ML SIL FOLEY, URN: Brand: MEDLINE

## (undated) DEVICE — CONNECTOR TBNG AUX H2O JET DISP FOR OLY 160/180 SER

## (undated) DEVICE — Z CONVERTED USE 2274299 CUFF BLD PRESSURE LNG MED AD 25-35 CM ARM FLEXIPORT DISP

## (undated) DEVICE — GOWN,SIRUS,NONRNF,SETINSLV,2XL,18/CS: Brand: MEDLINE

## (undated) DEVICE — TRI-LUMEN FILTERED TUBE SET WITH ACTIVATED CHARCOAL FILTER: Brand: AIRSEAL

## (undated) DEVICE — INFECTION CONTROL KIT SYS

## (undated) DEVICE — SUTURE V-LOC 90 3-0 L6IN ABSRB UD CV-23 L17MM 1/2 CIR VLOCM1904

## (undated) DEVICE — CATH URETH FOL 2W SH 14FRX5ML -- CONVERT TO ITEM 363071

## (undated) DEVICE — SUTURE ETHLN SZ 2-0 L18IN NONABSORBABLE BLK L26MM FS 3/8 664G

## (undated) DEVICE — DERMABOND SKIN ADH 0.7ML -- DERMABOND ADVANCED 12/BX

## (undated) DEVICE — PREP KIT PEEL PTCH POVIDONE IOD

## (undated) DEVICE — QUILTED PREMIUM COMFORT UNDERPAD,EXTRA HEAVY: Brand: WINGS

## (undated) DEVICE — KENDALL RADIOLUCENT FOAM MONITORING ELECTRODE -RECTANGULAR SHAPE: Brand: KENDALL

## (undated) DEVICE — CATH IV AUTOGRD BC BLU 22GA 25 -- INSYTE

## (undated) DEVICE — NEONATAL-ADULT SPO2 SENSOR: Brand: NELLCOR

## (undated) DEVICE — STERILE POLYISOPRENE POWDER-FREE SURGICAL GLOVES WITH EMOLLIENT COATING: Brand: PROTEXIS

## (undated) DEVICE — NEEDLE HYPO 18GA L1.5IN PNK S STL HUB POLYPR SHLD REG BVL

## (undated) DEVICE — TRAP SURG QUAD PARABOLA SLOT DSGN SFTY SCRN TRAPEASE

## (undated) DEVICE — AIRSEAL 12 MM ACCESS PORT AND PALM GRIP OBTURATOR WITH BLADELESS OPTICAL TIP, 120 MM LENGTH: Brand: AIRSEAL

## (undated) DEVICE — SUTURE STRATAFIX SYMMETRIC PDS + SZ 1 L18IN ABSRB VLT L48MM SXPP1A400

## (undated) DEVICE — NDL SPNE QNCKE 18GX3.5IN LF --

## (undated) DEVICE — 4-PORT MANIFOLD: Brand: NEPTUNE 2

## (undated) DEVICE — Z CONVERTED USE 2271043 CONTAINER SPEC COLL 4OZ SCR ON LID PEEL PCH

## (undated) DEVICE — 3M™ IOBAN™ 2 ANTIMICROBIAL INCISE DRAPE 6650EZ: Brand: IOBAN™ 2

## (undated) DEVICE — PAD,NON-ADHERENT,3X8,STERILE,LF,1/PK: Brand: MEDLINE

## (undated) DEVICE — NEEDLE HYPO 21GA L1.5IN INTRAMUSCULAR S STL LATCH BVL UP

## (undated) DEVICE — CLIP LIG ABSRB HEM-LOK LG PUR --

## (undated) DEVICE — SOLUTION IRRIG 1000ML H2O STRL BLT

## (undated) DEVICE — Z DUP USE 2275493 DRESSING ALGINATE POST OPERATIVE 10X3.5 IN RECT PRIMASEAL

## (undated) DEVICE — SYRINGE MED 20ML STD CLR PLAS LUERLOCK TIP N CTRL DISP

## (undated) DEVICE — DRAPE,ISOLATION,ACTIGARD, 129"X100": Brand: MEDLINE

## (undated) DEVICE — CLIP INT L12MM POLYMER DISP LAPRO-CLP

## (undated) DEVICE — OBTRTR BLDELSS 8MM DISP -- DA VINCI - SNGL USE

## (undated) DEVICE — BW-412T DISP COMBO CLEANING BRUSH: Brand: SINGLE USE COMBINATION CLEANING BRUSH

## (undated) DEVICE — SUTURE ETHBND EXCEL SZ 2 L30IN NONABSORBABLE GRN L40MM V-37 MX69G

## (undated) DEVICE — STRAP,POSITIONING,KNEE/BODY,FOAM,4X60": Brand: MEDLINE

## (undated) DEVICE — TAPE,CLOTH/SILK,CURAD,3"X10YD,LF,40/CS: Brand: CURAD

## (undated) DEVICE — ELECTRO LUBE IS A SINGLE PATIENT USE DEVICE THAT IS INTENDED TO BE USED ON ELECTROSURGICAL ELECTRODES TO REDUCE STICKING.: Brand: KEY SURGICAL ELECTRO LUBE

## (undated) DEVICE — GOWN,SIRUS,FABRNF,XL,20/CS: Brand: MEDLINE

## (undated) DEVICE — PAD BD MATTRESS 73X32 IN STD CONVOLUTED FOAM LTX FREE

## (undated) DEVICE — SUTURE VCRL SZ 2-0 L36IN ABSRB UD L40MM CT 1/2 CIR J957H

## (undated) DEVICE — SOLUTION IV 1000ML 0.9% SOD CHL

## (undated) DEVICE — CONTAINER,SPECIMEN,3OZ,OR STRL: Brand: MEDLINE

## (undated) DEVICE — T4 HOOD

## (undated) DEVICE — 3M™ STERI-DRAPE™ U-DRAPE 1015: Brand: STERI-DRAPE™

## (undated) DEVICE — SYR 50ML SLIP TIP NSAF LF STRL --

## (undated) DEVICE — Z INACTIVE NO USAGE TURNOVER KIT RM CLEANOP

## (undated) DEVICE — CANISTER, RIGID, 3000CC: Brand: MEDLINE INDUSTRIES, INC.

## (undated) DEVICE — TRAY CATH 16F URIN MTR LTX -- CONVERT TO ITEM 363111

## (undated) DEVICE — SET ADMIN 16ML TBNG L100IN 2 Y INJ SITE IV PIGGY BK DISP

## (undated) DEVICE — DRAPE,REIN 53X77,STERILE: Brand: MEDLINE

## (undated) DEVICE — BAG SPEC REM 224ML W4XL6IN DIA10MM 1 HND GYN DISP ENDOPCH

## (undated) DEVICE — SUTURE N ABSRB MONOFILAMENT 4-0 RB1 30 IN BLU PROLENE 8871H

## (undated) DEVICE — CATHETER,FOLEY,SILI-ELAST,LTX,16FR,30ML: Brand: MEDLINE

## (undated) DEVICE — Z DISCONTINUED USE 2751540 TUBING IRRIG L10IN DISP PMP ENDOGATOR

## (undated) DEVICE — HANDPIECE SET WITH BONE CLEANING TIP AND SUCTION TUBE: Brand: INTERPULSE

## (undated) DEVICE — SUTURE MCRYL SZ 3-0 L27IN ABSRB UD L24MM PS-1 3/8 CIR PRIM Y936H

## (undated) DEVICE — TIP COVER ACCESSORY

## (undated) DEVICE — ENDO CARRY-ON PROCEDURE KIT INCLUDES ENZYMATIC SPONGE, GAUZE, BIOHAZARD LABEL, TRAY, LUBRICANT, DIRTY SCOPE LABEL, WATER LABEL, TRAY, DRAWSTRING PAD, AND DEFENDO 4-PIECE KIT.: Brand: ENDO CARRY-ON PROCEDURE KIT

## (undated) DEVICE — SYR LR LCK 1ML GRAD NSAF 30ML --

## (undated) DEVICE — NEEDLE HYPO 21GA L1.5IN GRN POLYPR HUB S STL REG BVL STR

## (undated) DEVICE — Z DUPLICATE USE 2517136 APPLICATOR LAP 45 CM 2 FLX VISTASEAL

## (undated) DEVICE — SNARE VASC L240CM LOOP W10MM SHTH DIA2.4MM RND STIFF CLD

## (undated) DEVICE — SUTURE MCRYL SZ 2-0 L36IN ABSRB UD L36MM CT-1 1/2 CIR Y945H

## (undated) DEVICE — BAG BELONG PT PERS CLEAR HANDL

## (undated) DEVICE — 1200 GUARD II KIT W/5MM TUBE W/O VAC TUBE: Brand: GUARDIAN

## (undated) DEVICE — ARM DRAPE

## (undated) DEVICE — SUTURE ABSRB L6IN L37MM 0 GS-21 GRN 1/2 CIR TAPR PNT NDL VLOCL0306

## (undated) DEVICE — SPONGE HEMOSTAT CELLULS 4X8IN -- SURGICEL

## (undated) DEVICE — KIT IV STRT W CHLORAPREP PD 1ML

## (undated) DEVICE — CLICKLINE SCISSORS INSERT: Brand: CLICKLINE

## (undated) DEVICE — SOLIDIFIER FLUID 3000 CC ABSORB

## (undated) DEVICE — DRSG AQUACEL SURG 3.5X6IN -- CONVERT TO ITEM 369227

## (undated) DEVICE — SUTURE STRATAFIX SPRL SZ 1 L14IN ABSRB VLT L48CM CTX 1/2 SXPD2B405

## (undated) DEVICE — SEALANT FIBRIN 4 CC FRZN PRE FILLED SYR TISSEEL

## (undated) DEVICE — DRAIN SURG W10XL20CM SIL SMOOTH FLAT 3/4 PERF DBL WRP

## (undated) DEVICE — SEALANT TISS 10 CC FIBRIN VISTASEAL

## (undated) DEVICE — PREP SKN CHLRAPRP APL 26ML STR --

## (undated) DEVICE — INSUFFLATION NEEDLE TO ESTABLISH PNEUMOPERITONEUM.: Brand: INSUFFLATION NEEDLE

## (undated) DEVICE — SET EXTN TBNG L BOR 4 W STPCOCK ST 32IN PRIMING VOL 6ML

## (undated) DEVICE — RESERVOIR,SUCTION,100CC,SILICONE: Brand: MEDLINE

## (undated) DEVICE — SOLUTION IRRIG 3000ML 0.9% SOD CHL FLX CONT 0797208] ICU MEDICAL INC]

## (undated) DEVICE — CANN NASAL O2 CAPNOGRAPHY AD -- FILTERLINE

## (undated) DEVICE — DRAPE XR C ARM 41X74IN LF --

## (undated) DEVICE — AIRLIFE™ U/CONNECT-IT OXYGEN TUBING 7 FEET (2.1 M) CRUSH-RESISTANT OXYGEN TUBING, VINYL TIPPED: Brand: AIRLIFE™

## (undated) DEVICE — PADDING CAST SPEC 6INX4YD COT --

## (undated) DEVICE — SEAL UNIV 5-8MM DISP BX/10 -- DA VINCI XI - SNGL USE

## (undated) DEVICE — INSTRMT SET WND CLSR SUT PASS --

## (undated) DEVICE — SURGICAL PROCEDURE KIT GEN LAPAROSCOPY LF

## (undated) DEVICE — 6619 2 PTNT ISO SYS INCISE AREA&LT;(&GT;&&LT;)&GT;P: Brand: STERI-DRAPE™ IOBAN™ 2